# Patient Record
Sex: MALE | Race: BLACK OR AFRICAN AMERICAN | Employment: OTHER | URBAN - METROPOLITAN AREA
[De-identification: names, ages, dates, MRNs, and addresses within clinical notes are randomized per-mention and may not be internally consistent; named-entity substitution may affect disease eponyms.]

---

## 2018-03-27 ENCOUNTER — HOSPITAL ENCOUNTER (OUTPATIENT)
Dept: ULTRASOUND IMAGING | Age: 83
Discharge: HOME | End: 2018-03-27
Attending: INTERNAL MEDICINE
Payer: MEDICARE

## 2018-03-27 DIAGNOSIS — I65.29 STENOSIS OF CAROTID ARTERY, UNSPECIFIED LATERALITY: ICD-10-CM

## 2018-03-27 PROCEDURE — 93880 EXTRACRANIAL BILAT STUDY: CPT

## 2018-03-27 NOTE — PROGRESS NOTES
Carotid Duplex Doppler ultrasound reviewed and the left side where he had previous surgery is normal.  The right side has moderate blockage but is less than 70% so surgery is not indicated without symptoms at this time. Will recheck right side yearly. Continue cholesterol medication and plavix.  Please inform the patient

## 2018-03-28 ENCOUNTER — HOSPITAL ENCOUNTER (OUTPATIENT)
Dept: PHYSICAL THERAPY | Age: 83
Discharge: HOME | End: 2018-03-28
Attending: INTERNAL MEDICINE
Payer: MEDICARE

## 2018-03-28 DIAGNOSIS — R26.9 GAIT ABNORMALITY: ICD-10-CM

## 2018-03-28 PROCEDURE — G8978 MOBILITY CURRENT STATUS: HCPCS

## 2018-03-28 PROCEDURE — 97162 PT EVAL MOD COMPLEX 30 MIN: CPT

## 2018-03-28 PROCEDURE — G8979 MOBILITY GOAL STATUS: HCPCS

## 2018-03-28 NOTE — PROGRESS NOTES
ROBLES BALANCE SCALE  14-Item Long Form Original Version  1. SITTING TO STANDING  INSTRUCTIONS: Please stand up. Try not to use your hands for support.  [] (4) able to stand without using hands and stabilize independently  [x] (3) able to stand independently using hands  [] (2) able to stand using hands after several tries  [] (1) needs minimal aid to stand or to stabilize  [] (0) needs moderate or maximal assist to stand  2. STANDING UNSUPPORTED  INSTRUCTIONS: Please stand for two minutes without holding. [x] (4) able to stand safely 2 minutes  [] (3) able to stand 2 minutes with supervision  [] (2) able to stand 30 seconds unsupported  [] (1) needs several tries to stand 30 seconds unsupported  [] (0) unable to stand 30 seconds unassisted  3. SITTING WITH BACK UNSUPPORTED BUT FEET SUPPORTED ON FLOOR OR ON A STOOL  INSTRUCTIONS: Please sit with arms folded for 2 minutes. [x] (4) able to sit safely and securely 2 minutes  [] (3) able to sit 2 minutes under supervision  [] (2) able to sit 30 seconds  [] (1) able to sit 10 seconds  [] (0) unable to sit without support 10 seconds  4. STANDING TO SITTING  INSTRUCTIONS: Please sit down.  [] (4) sits safely with minimal use of hands  [x] (3) controls descent by using hands  [] (2) uses back of legs against chair to control descent  [] (1) sits independently but has uncontrolled descent  [] (0) needs assistance to sit  5. TRANSFERS  INSTRUCTIONS: Arrange chair(s) for a pivot transfer. Ask subject to transfer one way toward a seat with armrests and one way toward a seat without armrests. You may use two chairs (one with and one without armrests) or a bed and a chair. [x] (4) able to transfer safely with minor use of hands  [] (3) able to transfer safely definite need of hands  [] (2) able to transfer with verbal cueing and/or supervision  [] (1) needs one person to assist  [] (0) needs two people to assist or supervise to be safe  6.  STANDING UNSUPPORTED WITH EYES CLOSED  INSTRUCTIONS: Please close your eyes and stand still for 10 seconds. [x] (4) able to stand 10 seconds safely  [] (3) able to stand 10 seconds with supervision  [] (2) able to stand 3 seconds  [] (1) unable to keep eyes closed 3 seconds but stays steady  [] (0) needs help to keep from falling  7. STANDING UNSUPPORTED WITH FEET TOGETHER  INSTRUCTIONS: Place your feet together and stand without holding.  [] (4) able to place feet together independently and stand 1 minute safely  [x] (3) able to place feet together independently and stand for 1 minute with supervision  [] (2) able to place feet together independently but unable to hold for 30 seconds  [] (1) needs help to attain position but able to stand 15 seconds feet together  [] (0) needs help to attain position and unable to hold for 15 seconds  8. REACHING FORWARD WITH OUTSTRETCHED ARM WHILE STANDING  INSTRUCTIONS: Lift arm to 90 degrees. Stretch out your fingers and reach forward as far as you can. (Examiner places a ruler at end of fingertips when arm is at 90 degrees. Fingers should not touch the ruler while reaching forward. The recorded measure is the distance forward that the finger reaches while the subject is in the most forward lean position. When possible, ask subject to use both arms when reaching to avoid rotation of the trunk.)  [] (4) can reach forward confidently > 25 cm (10 inches)  [x] (3) can reach forward > 12 cm safely (5 inches)  [] (2) can reach forward > 5 cm safely (2 inches)  [] (1) reaches forward but needs supervision  [] (0) loses balance while trying/requires external support  9.  OBJECT FROM FLOOR FROM A STANDING POSITION  INSTRUCTIONS:  shoe/slipper which is placed in front of your feet.   [x] (4) able to  slipper safely and easily   [] (3) able to  slipper but needs supervision  [] (2) unable to  but reaches 2-5 cm (1-2 inches) from slipper and keeps balance independently  [] (1) unable to  and needs supervision while trying  [] (0) unable to try/needs assist to keep from losing balance or falling  10. TURNING TO LOOK BEHIND OVER LEFT AND RIGHT SHOULDERS WHILE STANDING  INSTRUCTIONS: Turn to look directly behind you over toward left shoulder. Repeat to the right. Examiner may pick an object to look at directly behind the subject to encourage a better twist turn. [x] (4) looks behind from both sides and weight shifts well  [] (3) looks behind one side only other side shows less weight shift  [] (2) turns sideways only but maintains balance  [] (1) needs supervision when turning  [] (0) needs assist to keep from losing balance or falling  11. TURN 360 DEGREES  INSTRUCTIONS: Turn completely around in a full [place]. Pause. Then turn a full [place] in the other direction.  [] (4) able to turn 360 degrees safely in 4 seconds or less  [x] (3) able to turn 360 degrees safely one side only in 4 seconds or less (to L side)  [] (2) able to turn 360 degrees safely but slowly  [] (1) needs close supervision or verbal cueing  [] (0) needs assistance while turning  12. PLACING ALTERNATE FOOT ON STEP OR STOOL WHILE STANDING UNSUPPORTED  INSTRUCTIONS: Place each foot alternately on the step/stool. Continue until each foot has touched the step/stool four times. [x] (4) able to stand independently and safely and complete 8 steps in 20 seconds  [] (3) able to stand independently and complete 8 steps > 20 seconds  [] (2) able to complete 4 steps without aid with supervision  [] (1) able to complete > 2 steps needs minimal assist  [] (0) needs assistance to keep from falling/unable to try  13. STANDING UNSUPPORTED ONE FOOT IN FRONT   INSTRUCTIONS: (DEMONSTRATE TO SUBJECT) Place one foot directly in front of the other.  If you feel that you cannot place your foot directly in front, try to step far enough ahead that the heel of your forward foot is ahead of the toes of the other foot (to score 3 points, the length of the step should exceed the length of the other foot and the width of the stance should approximate the subject's normal stride width)  [x] (4) able to place foot tandem independently and hold 30 seconds  [] (3) able to place foot ahead of other independently and hold 30 seconds  [] (2) able to take small step independently and hold 30 seconds  [] (1) needs help to step but can hold 15 seconds  [] (0) loses balance while stepping or standing  14.  STANDING ON ONE LEG  INSTRUCTIONS: Stand on one leg as long as you can without holding  [x] (4) able to lift leg independently and hold >10 seconds  [] (3) able to lift leg independently and hold 5-10 seconds  [] (2) able to lift leg independently and hold = or >3 seconds  [] (1) tries to lift leg unable to hold 3 seconds but remains standing independently  [] (0) unable to try or needs assist to prevent fall    Total Score: 51/56 (a person scoring below 45 is considered to be at risk for falling)

## 2018-03-28 NOTE — THERAPY EVALUATION
Mihaela Melendrez  : 1935  [place]  Secondary: [address] at [facility], [facility], [address]  [phone]   [phone]          OUTPATIENT PHYSICAL THERAPY:Initial Assessment 3/28/2018    ICD-10: Treatment Diagnosis: Difficulty in walking, not elsewhere classified (R26.2)   Unsteadiness on feet (R26.81)   Precautions/Allergies:   Zinc   Fall Risk Score: 7 (? 5 = High Risk)  MD Orders: Evaluate and treat MEDICAL/REFERRING DIAGNOSIS:  Gait abnormality [R26.9]   DATE OF ONSET: ongoing (but has gotten noticeably worse in the past year per pt report)  REFERRING PHYSICIAN: Nina Kellogg MD  RETURN PHYSICIAN APPOINTMENT: 2018   This section established at most recent assessment  INITIAL ASSESSMENT:  Mihaela Melendrez is a [de-identified] y.o. male who presents to physical therapy for decreased balance with ambulation. This session, he demonstrated slightly decreased B LE strength/endurance, multiple postural and gait deficits, and decreased dynamic standing balance as evident by a score of 18/30 on the Functional Gait Assessment (scores of 23 or less indicating an increased risk for falls). Pt may benefit from skilled PT to address the above listed deficits to improve safety and independence with transfers and ambulation prior to discharge. PROBLEM LIST (Impacting functional limitations):  1. Decreased Strength  2. Decreased ADL/Functional Activities  3. Decreased Transfer Abilities  4. Decreased Ambulation Ability/Technique  5. Decreased Balance  6. Decreased Activity Tolerance  7. Decreased Flexibility/Joint Mobility INTERVENTIONS PLANNED:  1. Balance Exercise  2. Bed Mobility  3. Cold  4. Family Education  5. Gait Training  6. Heat  7. Home Exercise Program (HEP)  8. Manual Therapy  9. Neuromuscular Re-education/Strengthening  10. Range of Motion (ROM)  11. Therapeutic Activites  12.  Therapeutic Exercise/Strengthening  13. Transfer Training   TREATMENT PLAN:  Effective Dates: 3/28/2018 TO 5/27/2018 (60 days). Frequency/Duration: 1 time a week for 60 Days  GOALS: (Goals have been discussed and agreed upon with patient.)  Short-Term Functional Goals: Time Frame: 30 days  1. Pt will be compliant with HEP in order to increase LE strength/endurance/mobility to improve functional mobility and overall quality of life. 2. Pt will demonstrate increase in Armas Balance Score to 53/56 in order to increase static/dynamic standing balance to reduce fall risk and improve safety and independence with all standing activities. 3. Pt will improve score on Functional Gait Assessment to 21/30 in order to in order to increase stability and improve safety/independence when walking in varied environments. Discharge Goals: Time Frame: 60 days  1. Pt will be independent with HEP in order to increase LE strength/endurance/mobility to improve functional mobility and overall quality of life. 2. Pt will demonstrate increase in Armas Balance Score to 56/56 in order to increase static/dynamic standing balance to reduce fall risk and improve safety and independence with all standing activities. 3. Pt will improve score on Functional Gait Assessment to 24/30 in order to in order to increase stability and improve safety/independence when walking in varied environments. Rehabilitation Potential For Stated Goals: Good  Regarding [facility] therapy, I certify that the treatment plan above will be carried out by a therapist or under their direction. Thank you for this referral,  Alicia Fagan DPT     Referring Physician Signature: Ros Purcell MD              Date                    HISTORY:   History of Present Injury/Illness (Reason for Referral): *History per pt or pt's family except where otherwise noted  Pt states his balance has been getting worse over the past several years.  States he feels like he bumps into walls, and when he walks he feels like his gait is a little \"slanted\". Pt states he walks with cane for community distances. He also has experienced vertigo when looking down before, but he has not experienced this for about 16 months per his report (states if he looks down he does get slightly dizzy). He also mentions that he gets a numbness in the L side of his face (had carotid surgery in 2004, but he had a sonagram for this yesterday with no positive results). He also has tinnitis and pain in his L ear. States he struggles with dry eyes, and has the most trouble in the mornings, and is not able to see very well for about 45 minutes. Pt states he has had one fall related to his knees (he was stooping and fell backward onto his rear end per his report), and he has an orthopedic visit scheduled to get both knees checked out soon (has been getting gel injections in every 6 months). Past Medical History/Comorbidities:   Mr. Eloina Mayer  has a past medical history of Arrhythmia; Arthritis; BPPV (benign paroxysmal positional vertigo); Carotid stenosis; Chronic kidney disease; Depression; Diabetes ([address] (dx 1995); GERD (gastroesophageal reflux disease); Hyperlipemia; Hypertension; Osteoarthritis, knee; PUD (peptic ulcer disease) (05/2016); TIA (transient ischemic attack) (2003); and Varicella. He also has no past medical history of Chronic obstructive pulmonary disease ([address] Mr. Eloina Mayer  has a past surgical history that includes hx lumbar laminectomy; hx thoracic laminectomy (2004); hx carotid endarterectomy (2002); hx hemorrhoidectomy (2007); hx other surgical (2005); and hx colonoscopy (05/01/2016).   Social History/Living Environment:    lives alone in 2 story house with 10 steps to get upstairs with rail on L side as he goes up (only goes upstairs about once/week)  Prior Level of Function/Work/Activity:  Retired; walks at apartment complex that his sister lives in for about 1 hr about 3 times/week on treadmill (uses B  support); volunteers at soup kitchen 6 hours/week (2 days a week for 3 hours each day - has to stand for this time); goes to [employer]; pt has life alert on his wrist  Dominant Side:         RIGHT  Personal Factors:          Sex:  male        Age:  [de-identified] y.o. Current Medications:    Current Outpatient Prescriptions:     glipiZIDE (GLUCOTROL) 5 mg tablet, Take 1 Tab by mouth two (2) times a day., Disp: 180 Tab, Rfl: 1    losartan (COZAAR) 100 mg tablet, Take 1 Tab by mouth daily. , Disp: 90 Tab, Rfl: 1    pantoprazole (PROTONIX) 40 mg tablet, Take 1 Tab by mouth two (2) times a day., Disp: 180 Tab, Rfl: 1    simvastatin (ZOCOR) 20 mg tablet, Take 1 Tab by mouth nightly., Disp: 90 Tab, Rfl: 1    traZODone (DESYREL) 50 mg tablet, TAKE 1 TO 2 TABLETS BY MOUTH AT NIGHT AS NEEDED FOR SLEEP, Disp: 60 Tab, Rfl: 3    carvedilol (COREG) 12.5 mg tablet, Take 1 Tab by mouth two (2) times daily (with meals). , Disp: 180 Tab, Rfl: 1    clopidogrel (PLAVIX) 75 mg tab, Take 1 Tab by mouth daily. , Disp: 90 Tab, Rfl: 1    tadalafil (CIALIS) 5 mg tablet, Take 1 Tab by mouth daily as needed. , Disp: 30 Tab, Rfl: 3    desonide (TRIDESILON) 0.05 % cream, Apply  to affected area two (2) times a day., Disp: 45 g, Rfl: 0    psyllium (METAMUCIL) packet, Take 1 Packet by mouth as needed. , Disp: , Rfl:     carboxymethylcellulose sodium (REFRESH LIQUIGEL) 1 % dlgl, Apply  to eye two (2) times a day., Disp: , Rfl:     VOLTAREN 1 % gel, APPLY TOPICALLY QID, Disp: , Rfl: 1    BLOOD-GLUCOSE METER (FREESTYLE LITE METER), by Does Not Apply route., Disp: , Rfl:     LANCETS, by Does Not Apply route., Disp: , Rfl:     ASCENSIA CONTOUR strip, , Disp: , Rfl: 0    polyethylene glycol (MIRALAX) 17 gram/dose powder, Take 17 g by mouth as needed. , Disp: , Rfl:    Date Last Reviewed:  3/28/2018   # of Personal Factors/Comorbidities that affect the Plan of Care: 1-2: MODERATE COMPLEXITY   EXAMINATION:   Initial assessment on 3/28/2018  Observation/Orthostatic Postural Assessment:          Seated: kyphotic posture, forward head, rounded shoulders, posterior pelvic tilt        Standing: slight forward trunk lean  ROM:    Initial measurement: (on 3/28/2018) Initial measurement: (on 3/28/2018) Reassessment measurement:  Reassessment measurement:    L LE:  Hip ER and knee flexion PROM slightly limited at end range (knee flexion painful at end range);    Tight hamstrings and plantarflexors R LE: WFL throughout hip/knee/ankle;   Tight hamstrings and plantarflexors     Comments about UE ROM: WFL    Strength:    Initial measurement: (on 3/28/2018) Initial measurement: (on 3/28/2018) Reassessment measurement:  Reassessment measurement:    L LE:  Hip flexion: 4+/5  Hip extension: 3+/5  Hip abduction: 4/5  Hip adduction: 4-/5  Hip IR: 4/5 (pain in back)  Hip ER: 4/5   Knee flexion: 4-/5  Knee extension: 5/5  Ankle DF: 4+/5  Ankle PF: WFL R LE:  Hip flexion: 4+/5  Hip extension: 3+/5  Hip abduction: 5/5  Hip adduction: 4/5  Hip IR: 4/5  Hip ER: 4/5  Knee flexion: 4/5  Knee extension: 5/5  Ankle DF: 4+/5  Ankle PF: WFL     Comments about UE strength: slightly decreased at L UE but WNL    Neurological Screen:        Myotomes:  WFL        Dermatomes:  Slightly decreased sensation at lateral side of L foot  Functional Mobility:         Gait/Ambulation:  Pt ambulates with no assistive device with following gait deficits: slightly decreased B step length/heel strike/clearance, increased B hip IR at initial contact at each LE, decreased arm swing, increased forward trunk lean        Transfers:     Level of assist   Sit to stand Rosanne; pushes up to stand   Stand to sit Rosanne; reaches back to sit   Chair to mat independent   Mat to chair independent           Bed Mobility:     Level of assist   Sit to supine independent   Rolling R independent   Rolling L independent   Scooting up/down/sideways independent   Supine to sit independent     Balance:          good in sitting; good in static standing; fair in dynamic standing  Coordination:          No deficits noted  Mental Status:          WFL  Sensation:         Slightly decreased at lateral L foot   Body Structures Involved:  1. Nerves  2. Bones  3. Joints  4. Muscles  5. Ligaments Body Functions Affected:  1. Sensory/Pain  2. Neuromusculoskeletal  3. Movement Related Activities and Participation Affected:  1. General Tasks and Demands  2. Mobility  3. Self Care  4. Domestic Life  5. Interpersonal Interactions and Relationships  6. Community, Social and [place]   # of elements that affect the Plan of Care: 4+: HIGH COMPLEXITY   CLINICAL PRESENTATION:   Presentation: Evolving clinical presentation with changing clinical characteristics: MODERATE COMPLEXITY   CLINICAL DECISION MAKING:   Outcome Measure: Tool Used: Armas Balance Scale  Score:  Initial: 51/56 Most Recent: X/56 (Date: -- )   Interpretation of Score: Each section is scored on a 0-4 scale, 0 representing the patients inability to perform the task and 4 representing independence. The scores of each section are added together for a total score of 56. The higher the patients score, the more independent the patient is. Any score below 45 indicates increased risk for falls. Score 56 55-45 44-34 33-23 22-12 11-1 0   Modifier  CI CJ CK CL CM CN     Tool Used: Functional Gait Assessment  Score:  Initial: 18/30  Most Recent: X/30 (Date: -- )   Interpretation of Score: This test is a modification of the Dynamic Gait Index. It is a 10 item test with each item score 0-3 that assesses postural stability during various walking tasks. Score 30 29-24 23-18 17-13 12-7 6-1 0   Modifier  CI CJ CK CL CM CN     ?  Mobility - Walking and Moving Around:     - CURRENT STATUS: CJ - 20%-39% impaired, limited or restricted    - GOAL STATUS: CI - 1%-19% impaired, limited or restricted    - D/C STATUS:  ---------------To be determined---------------  [place] / [place] / Product Type: Medicare /   Medical Necessity:   · Patient is expected to demonstrate progress in strength, range of motion, balance and functional technique to improve ability to perform safe ambulation. · Patient demonstrates good rehab potential due to higher previous functional level. Reason for Services/Other Comments:  · Patient continues to require modification of therapeutic interventions to increase complexity of exercises. Use of outcome tool(s) and clinical judgement create a POC that gives a: Questionable prediction of patient's progress: MODERATE COMPLEXITY   TREATMENT:   (In addition to Assessment/Re-Assessment sessions the following treatments were rendered)  Subjective comments at beginning of session: See history above. Assessment only today, no treatment provided. Treatment/Session Assessment:  See assessment above. · Pain/ Symptoms: Initial:   5/10 pain in L knee Post Session:  No change in pain noted ·   Compliance with Program/Exercises: Will assess as treatment progresses. · Recommendations/Intent for next treatment session: \"Next visit will focus on advancements to more challenging activities and reduction in assistance provided\".  Work on gait training (especially with head movements or decreased vision); higher level balance activities; exercises with BIG movement  Total Treatment Duration:  PT Patient Time In/Time Out  Time In: 0825  Time Out: [address], DPT

## 2018-03-28 NOTE — PROGRESS NOTES
Ambulatory/Rehab Services H2 Model Falls Risk Assessment    Risk Factor Pts. ·   Confusion/Disorientation/Impulsivity  []    4 ·   Symptomatic Depression  []   2 ·   Altered Elimination  []   1 ·   Dizziness/Vertigo  []   1 ·   Gender (Male)  [x]   1 ·   Any administered antiepileptics (anticonvulsants):  []   2 ·   Any administered benzodiazepines:  []   1 ·   Visual Impairment (specify):  []   1 ·   Portable Oxygen Use  []   1 ·   Orthostatic ? BP  []   1 ·   History of Recent Falls (within 3 mos.)  [x]   5     Ability to Rise from Chair (choose one) Pts. ·   Ability to rise in a single movement  []   0 ·   Pushes up, successful in one attempt  [x]   1 ·   Multiple attempts, but successful  []   3 ·   Unable to rise without assistance  []   4   Total: (5 or greater = High Risk) 7     Falls Prevention Plan:   [x]                Physical Limitations to Exercise (specify): supervised standing exercises initially   [x]                Mobility Assistance Device (type):  cane   []                Exercise/Equipment Adaptation (specify):    ©2010 [facility] [address] [place] [address] Patent #[id number].  Federal Law prohibits the replication, distribution or use without written permission from [facility]

## 2018-04-03 ENCOUNTER — HOSPITAL ENCOUNTER (OUTPATIENT)
Dept: PHYSICAL THERAPY | Age: 83
Discharge: HOME | End: 2018-04-03
Attending: INTERNAL MEDICINE
Payer: MEDICARE

## 2018-04-03 PROCEDURE — 97112 NEUROMUSCULAR REEDUCATION: CPT

## 2018-04-03 PROCEDURE — 97110 THERAPEUTIC EXERCISES: CPT

## 2018-04-03 NOTE — PROGRESS NOTES
Home Exercise Program    With support at counter and chair, practice standing with one foot in front of the other with:  1. Eyes open x 30 seconds   2. Eyes closed x 30 seconds  3. Eyes open, turning head slowly left/right x 10 reps/1 set each direction  4.  Eyes open, turning head up/down x 10 reps/1 set each direction

## 2018-04-03 NOTE — PROGRESS NOTES
Nestor Huynh  : 1935  [place]  Secondary: [address] at [facility], [facility], [address]  [phone]   [phone]          OUTPATIENT PHYSICAL THERAPY:Daily Note 4/3/2018    ICD-10: Treatment Diagnosis: Difficulty in walking, not elsewhere classified (R26.2)   Unsteadiness on feet (R26.81)   Precautions/Allergies:   Zinc   Fall Risk Score: 7 (? 5 = High Risk)  MD Orders: Evaluate and treat MEDICAL/REFERRING DIAGNOSIS:  Gait abnormality [R26.9]   DATE OF ONSET: ongoing (but has gotten noticeably worse in the past year per pt report)  REFERRING PHYSICIAN: Katie Portillo MD  RETURN PHYSICIAN APPOINTMENT: 2018   This section established at most recent assessment  INITIAL ASSESSMENT:  Nestor Huynh is a [de-identified] y.o. male who presents to physical therapy for decreased balance with ambulation. This session, he demonstrated slightly decreased B LE strength/endurance, multiple postural and gait deficits, and decreased dynamic standing balance as evident by a score of 18/30 on the Functional Gait Assessment (scores of 23 or less indicating an increased risk for falls). Pt may benefit from skilled PT to address the above listed deficits to improve safety and independence with transfers and ambulation prior to discharge. PROBLEM LIST (Impacting functional limitations):  1. Decreased Strength  2. Decreased ADL/Functional Activities  3. Decreased Transfer Abilities  4. Decreased Ambulation Ability/Technique  5. Decreased Balance  6. Decreased Activity Tolerance  7. Decreased Flexibility/Joint Mobility INTERVENTIONS PLANNED:  1. Balance Exercise  2. Bed Mobility  3. Cold  4. Family Education  5. Gait Training  6. Heat  7. Home Exercise Program (HEP)  8. Manual Therapy  9. Neuromuscular Re-education/Strengthening  10. Range of Motion (ROM)  11. Therapeutic Activites  12.  Therapeutic Exercise/Strengthening  13. Transfer Training   TREATMENT PLAN:  Effective Dates: 3/28/2018 TO 5/27/2018 (60 days). Frequency/Duration: 1 time a week for 60 Days  GOALS: (Goals have been discussed and agreed upon with patient.)  Short-Term Functional Goals: Time Frame: 30 days  1. Pt will be compliant with HEP in order to increase LE strength/endurance/mobility to improve functional mobility and overall quality of life. 2. Pt will demonstrate increase in Armas Balance Score to 53/56 in order to increase static/dynamic standing balance to reduce fall risk and improve safety and independence with all standing activities. 3. Pt will improve score on Functional Gait Assessment to 21/30 in order to in order to increase stability and improve safety/independence when walking in varied environments. Discharge Goals: Time Frame: 60 days  1. Pt will be independent with HEP in order to increase LE strength/endurance/mobility to improve functional mobility and overall quality of life. 2. Pt will demonstrate increase in Armas Balance Score to 56/56 in order to increase static/dynamic standing balance to reduce fall risk and improve safety and independence with all standing activities. 3. Pt will improve score on Functional Gait Assessment to 24/30 in order to in order to increase stability and improve safety/independence when walking in varied environments. Rehabilitation Potential For Stated Goals: Good  Regarding Jammie Marks therapy, I certify that the treatment plan above will be carried out by a therapist or under their direction. Thank you for this referral,  Radha Edmond DPT     Referring Physician Signature: Shruthi Kirk MD              Date                    HISTORY:   History of Present Injury/Illness (Reason for Referral): *History per pt or pt's family except where otherwise noted  Pt states his balance has been getting worse over the past several years.  States he feels like he bumps into walls, and when he walks he feels like his gait is a little \"slanted\". Pt states he walks with cane for community distances. He also has experienced vertigo when looking down before, but he has not experienced this for about 16 months per his report (states if he looks down he does get slightly dizzy). He also mentions that he gets a numbness in the L side of his face (had carotid surgery in 2004, but he had a sonagram for this yesterday with no positive results). He also has tinnitis and pain in his L ear. States he struggles with dry eyes, and has the most trouble in the mornings, and is not able to see very well for about 45 minutes. Pt states he has had one fall related to his knees (he was stooping and fell backward onto his rear end per his report), and he has an orthopedic visit scheduled to get both knees checked out soon (has been getting gel injections in every 6 months). Past Medical History/Comorbidities:   Mr. Madhu Galloway  has a past medical history of Arrhythmia; Arthritis; BPPV (benign paroxysmal positional vertigo); Carotid stenosis; Chronic kidney disease; Depression; Diabetes ([address] (dx 1995); GERD (gastroesophageal reflux disease); Hyperlipemia; Hypertension; Osteoarthritis, knee; PUD (peptic ulcer disease) (05/2016); TIA (transient ischemic attack) (2003); and Varicella. He also has no past medical history of Chronic obstructive pulmonary disease ([address] Mr. Madhu Galloway  has a past surgical history that includes hx lumbar laminectomy; hx thoracic laminectomy (2004); hx carotid endarterectomy (2002); hx hemorrhoidectomy (2007); hx other surgical (2005); and hx colonoscopy (05/01/2016).   Social History/Living Environment:    lives alone in 2 story house with 10 steps to get upstairs with rail on L side as he goes up (only goes upstairs about once/week)  Prior Level of Function/Work/Activity:  Retired; walks at apartment complex that his sister lives in for about 1 hr about 3 times/week on treadmill (uses B  support); volunteers at soup kitchen 6 hours/week (2 days a week for 3 hours each day - has to stand for this time); goes to [employer]; pt has life alert on his wrist  Dominant Side:         RIGHT  Personal Factors:          Sex:  male        Age:  [de-identified] y.o. Current Medications:    Current Outpatient Prescriptions:     glipiZIDE (GLUCOTROL) 5 mg tablet, Take 1 Tab by mouth two (2) times a day., Disp: 180 Tab, Rfl: 1    losartan (COZAAR) 100 mg tablet, Take 1 Tab by mouth daily. , Disp: 90 Tab, Rfl: 1    pantoprazole (PROTONIX) 40 mg tablet, Take 1 Tab by mouth two (2) times a day., Disp: 180 Tab, Rfl: 1    simvastatin (ZOCOR) 20 mg tablet, Take 1 Tab by mouth nightly., Disp: 90 Tab, Rfl: 1    traZODone (DESYREL) 50 mg tablet, TAKE 1 TO 2 TABLETS BY MOUTH AT NIGHT AS NEEDED FOR SLEEP, Disp: 60 Tab, Rfl: 3    carvedilol (COREG) 12.5 mg tablet, Take 1 Tab by mouth two (2) times daily (with meals). , Disp: 180 Tab, Rfl: 1    clopidogrel (PLAVIX) 75 mg tab, Take 1 Tab by mouth daily. , Disp: 90 Tab, Rfl: 1    tadalafil (CIALIS) 5 mg tablet, Take 1 Tab by mouth daily as needed. , Disp: 30 Tab, Rfl: 3    desonide (TRIDESILON) 0.05 % cream, Apply  to affected area two (2) times a day., Disp: 45 g, Rfl: 0    psyllium (METAMUCIL) packet, Take 1 Packet by mouth as needed. , Disp: , Rfl:     carboxymethylcellulose sodium (REFRESH LIQUIGEL) 1 % dlgl, Apply  to eye two (2) times a day., Disp: , Rfl:     VOLTAREN 1 % gel, APPLY TOPICALLY QID, Disp: , Rfl: 1    BLOOD-GLUCOSE METER (FREESTYLE LITE METER), by Does Not Apply route., Disp: , Rfl:     LANCETS, by Does Not Apply route., Disp: , Rfl:     ASCENSIA CONTOUR strip, , Disp: , Rfl: 0    polyethylene glycol (MIRALAX) 17 gram/dose powder, Take 17 g by mouth as needed. , Disp: , Rfl:    Date Last Reviewed:  4/3/2018   # of Personal Factors/Comorbidities that affect the Plan of Care: 1-2: MODERATE COMPLEXITY   EXAMINATION:   Initial assessment on 3/28/2018  Observation/Orthostatic Postural Assessment:          Seated: kyphotic posture, forward head, rounded shoulders, posterior pelvic tilt        Standing: slight forward trunk lean  ROM:    Initial measurement: (on 3/28/2018) Initial measurement: (on 3/28/2018) Reassessment measurement:  Reassessment measurement:    L LE:  Hip ER and knee flexion PROM slightly limited at end range (knee flexion painful at end range);    Tight hamstrings and plantarflexors R LE: WFL throughout hip/knee/ankle;   Tight hamstrings and plantarflexors     Comments about UE ROM: WFL    Strength:    Initial measurement: (on 3/28/2018) Initial measurement: (on 3/28/2018) Reassessment measurement:  Reassessment measurement:    L LE:  Hip flexion: 4+/5  Hip extension: 3+/5  Hip abduction: 4/5  Hip adduction: 4-/5  Hip IR: 4/5 (pain in back)  Hip ER: 4/5   Knee flexion: 4-/5  Knee extension: 5/5  Ankle DF: 4+/5  Ankle PF: WFL R LE:  Hip flexion: 4+/5  Hip extension: 3+/5  Hip abduction: 5/5  Hip adduction: 4/5  Hip IR: 4/5  Hip ER: 4/5  Knee flexion: 4/5  Knee extension: 5/5  Ankle DF: 4+/5  Ankle PF: WFL     Comments about UE strength: slightly decreased at L UE but WNL    Neurological Screen:        Myotomes:  WFL        Dermatomes:  Slightly decreased sensation at lateral side of L foot  Functional Mobility:         Gait/Ambulation:  Pt ambulates with no assistive device with following gait deficits: slightly decreased B step length/heel strike/clearance, increased B hip IR at initial contact at each LE, decreased arm swing, increased forward trunk lean        Transfers:     Level of assist   Sit to stand Rosanne; pushes up to stand   Stand to sit Rosanne; reaches back to sit   Chair to mat independent   Mat to chair independent           Bed Mobility:     Level of assist   Sit to supine independent   Rolling R independent   Rolling L independent   Scooting up/down/sideways independent   Supine to sit independent     Balance:          good in sitting; good in static standing; fair in dynamic standing  Coordination:          No deficits noted  Mental Status:          WFL  Sensation:         Slightly decreased at lateral L foot   Body Structures Involved:  1. Nerves  2. Bones  3. Joints  4. Muscles  5. Ligaments Body Functions Affected:  1. Sensory/Pain  2. Neuromusculoskeletal  3. Movement Related Activities and Participation Affected:  1. General Tasks and Demands  2. Mobility  3. Self Care  4. Domestic Life  5. Interpersonal Interactions and Relationships  6. Community, Social and [place]   # of elements that affect the Plan of Care: 4+: HIGH COMPLEXITY   CLINICAL PRESENTATION:   Presentation: Evolving clinical presentation with changing clinical characteristics: MODERATE COMPLEXITY   CLINICAL DECISION MAKING:   Outcome Measure: Tool Used: Armas Balance Scale  Score:  Initial: 51/56 Most Recent: X/56 (Date: -- )   Interpretation of Score: Each section is scored on a 0-4 scale, 0 representing the patients inability to perform the task and 4 representing independence. The scores of each section are added together for a total score of 56. The higher the patients score, the more independent the patient is. Any score below 45 indicates increased risk for falls. Score 56 55-45 44-34 33-23 22-12 11-1 0   Modifier  CI CJ CK CL CM CN     Tool Used: Functional Gait Assessment  Score:  Initial: 18/30  Most Recent: X/30 (Date: -- )   Interpretation of Score: This test is a modification of the Dynamic Gait Index. It is a 10 item test with each item score 0-3 that assesses postural stability during various walking tasks. Score 30 29-24 23-18 17-13 12-7 6-1 0   Modifier  CI CJ CK CL CM CN     ?  Mobility - Walking and Moving Around:     - CURRENT STATUS: CJ - 20%-39% impaired, limited or restricted    - GOAL STATUS: CI - 1%-19% impaired, limited or restricted    - D/C STATUS:  ---------------To be determined---------------  [place] / [place] / Product Type: Medicare /   Medical Necessity:   · Patient is expected to demonstrate progress in strength, range of motion, balance and functional technique to improve ability to perform safe ambulation. · Patient demonstrates good rehab potential due to higher previous functional level. Reason for Services/Other Comments:  · Patient continues to require modification of therapeutic interventions to increase complexity of exercises. Use of outcome tool(s) and clinical judgement create a POC that gives a: Questionable prediction of patient's progress: MODERATE COMPLEXITY   TREATMENT:   (In addition to Assessment/Re-Assessment sessions the following treatments were rendered)  Subjective comments at beginning of session: Pt states he got x-rays of B knees and L shoulder; got injections in his B knees  THERAPEUTIC EXERCISE: (13 minutes):  Exercises per grid below to improve mobility, strength, balance and dynamic movement of leg - bilateral to improve functional ambulation. Required minimal visual, verbal and manual cues to promote proper body alignment, promote proper body posture and promote proper body mechanics. Progressed resistance, range, repetitions and complexity of movement as indicated. Date:  4/3/2018 Date:   Date:   Activity/Exercise Parameters Parameters    Nustep L3 resistance; 10 minutes with B LEs only for strength/endurance     Ankle plantarflexor stretch on incline board 30 second hold x 3 reps with knees extended then flexed with B UE support                                   *given in HEP    NEUROMUSCULAR RE-EDUCATION: (32 minutes):  Exercise/activities per grid below to improve balance, coordination and posture. Required minimal visual, verbal and manual cues to promote static and dynamic balance in standing.    Date:  4/3/2018 Date:   Date:   Activity/Exercise Parameters Parameters    Tapping on rocker board Anterior/posterior taps x 20 reps with B UE support progressing to minimal to no UE support with occasional CGA-Jeanette to correct losses of balance; lateral taps x 20 reps B UE support progressing quickly to no UE support with S only  Also progressed to eyes closed both directions with same levels of assist     Static standing balance on foam Tandem position each LE forward x 30 second trials eyes open with minimal to no UE use; 5-15 second trials eyes closed with occasional UE use; 10 reps/1 set looking up/down with occasional UE use; 10 reps/1 set looking L/R with occasional UE use                                   *given in HEP       Treatment/Session Assessment:  Pt demonstrating good technique with balance exercises with eyes open, but had increased difficulty performing with closed eyes/head movement. He did demonstrate improved ability to perform more challenging balance exercises with increased practice. Gave pt tandem static standing balance exercises for HEP. · Pain/ Symptoms: Initial:   4.5/10 pain in L knee Post Session:  No change in pain noted at end of session ·   Compliance with Program/Exercises: Will assess as treatment progresses. · Recommendations/Intent for next treatment session: \"Next visit will focus on advancements to more challenging activities and reduction in assistance provided\".  Work on gait training (especially with head movements or decreased vision); higher level balance activities; exercises with BIG movement  Total Treatment Duration:  PT Patient Time In/Time Out  Time In: 1030  Time Out: LAI Guan

## 2018-04-10 ENCOUNTER — HOSPITAL ENCOUNTER (OUTPATIENT)
Dept: PHYSICAL THERAPY | Age: 83
Discharge: HOME | End: 2018-04-10
Attending: INTERNAL MEDICINE
Payer: MEDICARE

## 2018-04-10 PROCEDURE — 97110 THERAPEUTIC EXERCISES: CPT

## 2018-04-10 PROCEDURE — 97112 NEUROMUSCULAR REEDUCATION: CPT

## 2018-04-10 NOTE — PROGRESS NOTES
Hieu Murray  : 1935  [place]  Secondary: [address] at [facility], [facility], [address]  [phone]   [phone]          OUTPATIENT PHYSICAL THERAPY:Daily Note 4/10/2018    ICD-10: Treatment Diagnosis: Difficulty in walking, not elsewhere classified (R26.2)   Unsteadiness on feet (R26.81)   Precautions/Allergies:   Zinc   Fall Risk Score: 7 (? 5 = High Risk)  MD Orders: Evaluate and treat MEDICAL/REFERRING DIAGNOSIS:  Gait abnormality [R26.9]   DATE OF ONSET: ongoing (but has gotten noticeably worse in the past year per pt report)  REFERRING PHYSICIAN: Kelle Chu MD  RETURN PHYSICIAN APPOINTMENT: 2018   This section established at most recent assessment  INITIAL ASSESSMENT:  Hieu Murray is a [de-identified] y.o. male who presents to physical therapy for decreased balance with ambulation. This session, he demonstrated slightly decreased B LE strength/endurance, multiple postural and gait deficits, and decreased dynamic standing balance as evident by a score of 18/30 on the Functional Gait Assessment (scores of 23 or less indicating an increased risk for falls). Pt may benefit from skilled PT to address the above listed deficits to improve safety and independence with transfers and ambulation prior to discharge. PROBLEM LIST (Impacting functional limitations):  1. Decreased Strength  2. Decreased ADL/Functional Activities  3. Decreased Transfer Abilities  4. Decreased Ambulation Ability/Technique  5. Decreased Balance  6. Decreased Activity Tolerance  7. Decreased Flexibility/Joint Mobility INTERVENTIONS PLANNED:  1. Balance Exercise  2. Bed Mobility  3. Cold  4. Family Education  5. Gait Training  6. Heat  7. Home Exercise Program (HEP)  8. Manual Therapy  9. Neuromuscular Re-education/Strengthening  10. Range of Motion (ROM)  11. Therapeutic Activites  12.  Therapeutic Exercise/Strengthening  13. Transfer Training   TREATMENT PLAN:  Effective Dates: 3/28/2018 TO 5/27/2018 (60 days). Frequency/Duration: 1 time a week for 60 Days  GOALS: (Goals have been discussed and agreed upon with patient.)  Short-Term Functional Goals: Time Frame: 30 days  1. Pt will be compliant with HEP in order to increase LE strength/endurance/mobility to improve functional mobility and overall quality of life. 2. Pt will demonstrate increase in Armas Balance Score to 53/56 in order to increase static/dynamic standing balance to reduce fall risk and improve safety and independence with all standing activities. 3. Pt will improve score on Functional Gait Assessment to 21/30 in order to in order to increase stability and improve safety/independence when walking in varied environments. Discharge Goals: Time Frame: 60 days  1. Pt will be independent with HEP in order to increase LE strength/endurance/mobility to improve functional mobility and overall quality of life. 2. Pt will demonstrate increase in Armas Balance Score to 56/56 in order to increase static/dynamic standing balance to reduce fall risk and improve safety and independence with all standing activities. 3. Pt will improve score on Functional Gait Assessment to 24/30 in order to in order to increase stability and improve safety/independence when walking in varied environments. Rehabilitation Potential For Stated Goals: Good  Regarding Rosalio Chime therapy, I certify that the treatment plan above will be carried out by a therapist or under their direction. Thank you for this referral,  Darshana Roque DPT     Referring Physician Signature: Nina Kellogg MD              Date                    HISTORY:   History of Present Injury/Illness (Reason for Referral): *History per pt or pt's family except where otherwise noted  Pt states his balance has been getting worse over the past several years.  States he feels like he bumps into walls, and when he walks he feels like his gait is a little \"slanted\". Pt states he walks with cane for community distances. He also has experienced vertigo when looking down before, but he has not experienced this for about 16 months per his report (states if he looks down he does get slightly dizzy). He also mentions that he gets a numbness in the L side of his face (had carotid surgery in 2004, but he had a sonagram for this yesterday with no positive results). He also has tinnitis and pain in his L ear. States he struggles with dry eyes, and has the most trouble in the mornings, and is not able to see very well for about 45 minutes. Pt states he has had one fall related to his knees (he was stooping and fell backward onto his rear end per his report), and he has an orthopedic visit scheduled to get both knees checked out soon (has been getting gel injections in every 6 months). Past Medical History/Comorbidities:   Mr. Jackie Gagnon  has a past medical history of Arrhythmia; Arthritis; BPPV (benign paroxysmal positional vertigo); Carotid stenosis; Chronic kidney disease; Depression; Diabetes ([address] (dx 1995); GERD (gastroesophageal reflux disease); Hyperlipemia; Hypertension; Osteoarthritis, knee; PUD (peptic ulcer disease) (05/2016); TIA (transient ischemic attack) (2003); and Varicella. He also has no past medical history of Chronic obstructive pulmonary disease ([address] Mr. Jackie Gagnon  has a past surgical history that includes hx lumbar laminectomy; hx thoracic laminectomy (2004); hx carotid endarterectomy (2002); hx hemorrhoidectomy (2007); hx other surgical (2005); and hx colonoscopy (05/01/2016).   Social History/Living Environment:    lives alone in 2 story house with 10 steps to get upstairs with rail on L side as he goes up (only goes upstairs about once/week)  Prior Level of Function/Work/Activity:  Retired; walks at apartment complex that his sister lives in for about 1 hr about 3 times/week on treadmill (uses B  support); volunteers at soup kitchen 6 hours/week (2 days a week for 3 hours each day - has to stand for this time); goes to [employer]; pt has life alert on his wrist  Dominant Side:         RIGHT  Personal Factors:          Sex:  male        Age:  [de-identified] y.o. Current Medications:    Current Outpatient Prescriptions:     glipiZIDE (GLUCOTROL) 5 mg tablet, Take 1 Tab by mouth two (2) times a day., Disp: 180 Tab, Rfl: 1    losartan (COZAAR) 100 mg tablet, Take 1 Tab by mouth daily. , Disp: 90 Tab, Rfl: 1    pantoprazole (PROTONIX) 40 mg tablet, Take 1 Tab by mouth two (2) times a day., Disp: 180 Tab, Rfl: 1    simvastatin (ZOCOR) 20 mg tablet, Take 1 Tab by mouth nightly., Disp: 90 Tab, Rfl: 1    traZODone (DESYREL) 50 mg tablet, TAKE 1 TO 2 TABLETS BY MOUTH AT NIGHT AS NEEDED FOR SLEEP, Disp: 60 Tab, Rfl: 3    carvedilol (COREG) 12.5 mg tablet, Take 1 Tab by mouth two (2) times daily (with meals). , Disp: 180 Tab, Rfl: 1    clopidogrel (PLAVIX) 75 mg tab, Take 1 Tab by mouth daily. , Disp: 90 Tab, Rfl: 1    tadalafil (CIALIS) 5 mg tablet, Take 1 Tab by mouth daily as needed. , Disp: 30 Tab, Rfl: 3    desonide (TRIDESILON) 0.05 % cream, Apply  to affected area two (2) times a day., Disp: 45 g, Rfl: 0    psyllium (METAMUCIL) packet, Take 1 Packet by mouth as needed. , Disp: , Rfl:     carboxymethylcellulose sodium (REFRESH LIQUIGEL) 1 % dlgl, Apply  to eye two (2) times a day., Disp: , Rfl:     VOLTAREN 1 % gel, APPLY TOPICALLY QID, Disp: , Rfl: 1    BLOOD-GLUCOSE METER (FREESTYLE LITE METER), by Does Not Apply route., Disp: , Rfl:     LANCETS, by Does Not Apply route., Disp: , Rfl:     ASCENSIA CONTOUR strip, , Disp: , Rfl: 0    polyethylene glycol (MIRALAX) 17 gram/dose powder, Take 17 g by mouth as needed. , Disp: , Rfl:    Date Last Reviewed:  4/10/2018   # of Personal Factors/Comorbidities that affect the Plan of Care: 1-2: MODERATE COMPLEXITY   EXAMINATION:   Initial assessment on 3/28/2018  Observation/Orthostatic Postural Assessment:          Seated: kyphotic posture, forward head, rounded shoulders, posterior pelvic tilt        Standing: slight forward trunk lean  ROM:    Initial measurement: (on 3/28/2018) Initial measurement: (on 3/28/2018) Reassessment measurement:  Reassessment measurement:    L LE:  Hip ER and knee flexion PROM slightly limited at end range (knee flexion painful at end range);    Tight hamstrings and plantarflexors R LE: WFL throughout hip/knee/ankle;   Tight hamstrings and plantarflexors     Comments about UE ROM: WFL    Strength:    Initial measurement: (on 3/28/2018) Initial measurement: (on 3/28/2018) Reassessment measurement:  Reassessment measurement:    L LE:  Hip flexion: 4+/5  Hip extension: 3+/5  Hip abduction: 4/5  Hip adduction: 4-/5  Hip IR: 4/5 (pain in back)  Hip ER: 4/5   Knee flexion: 4-/5  Knee extension: 5/5  Ankle DF: 4+/5  Ankle PF: WFL R LE:  Hip flexion: 4+/5  Hip extension: 3+/5  Hip abduction: 5/5  Hip adduction: 4/5  Hip IR: 4/5  Hip ER: 4/5  Knee flexion: 4/5  Knee extension: 5/5  Ankle DF: 4+/5  Ankle PF: WFL     Comments about UE strength: slightly decreased at L UE but WNL    Neurological Screen:        Myotomes:  WFL        Dermatomes:  Slightly decreased sensation at lateral side of L foot  Functional Mobility:         Gait/Ambulation:  Pt ambulates with no assistive device with following gait deficits: slightly decreased B step length/heel strike/clearance, increased B hip IR at initial contact at each LE, decreased arm swing, increased forward trunk lean        Transfers:     Level of assist   Sit to stand Rosanne; pushes up to stand   Stand to sit Rosanne; reaches back to sit   Chair to mat independent   Mat to chair independent           Bed Mobility:     Level of assist   Sit to supine independent   Rolling R independent   Rolling L independent   Scooting up/down/sideways independent   Supine to sit independent     Balance:          good in sitting; good in static standing; fair in dynamic standing  Coordination:          No deficits noted  Mental Status:          WFL  Sensation:         Slightly decreased at lateral L foot   Body Structures Involved:  1. Nerves  2. Bones  3. Joints  4. Muscles  5. Ligaments Body Functions Affected:  1. Sensory/Pain  2. Neuromusculoskeletal  3. Movement Related Activities and Participation Affected:  1. General Tasks and Demands  2. Mobility  3. Self Care  4. Domestic Life  5. Interpersonal Interactions and Relationships  6. Community, Social and [place]   # of elements that affect the Plan of Care: 4+: HIGH COMPLEXITY   CLINICAL PRESENTATION:   Presentation: Evolving clinical presentation with changing clinical characteristics: MODERATE COMPLEXITY   CLINICAL DECISION MAKING:   Outcome Measure: Tool Used: Armas Balance Scale  Score:  Initial: 51/56 Most Recent: X/56 (Date: -- )   Interpretation of Score: Each section is scored on a 0-4 scale, 0 representing the patients inability to perform the task and 4 representing independence. The scores of each section are added together for a total score of 56. The higher the patients score, the more independent the patient is. Any score below 45 indicates increased risk for falls. Score 56 55-45 44-34 33-23 22-12 11-1 0   Modifier CH CI CJ CK CL CM CN     Tool Used: Functional Gait Assessment  Score:  Initial: 18/30  Most Recent: X/30 (Date: -- )   Interpretation of Score: This test is a modification of the Dynamic Gait Index. It is a 10 item test with each item score 0-3 that assesses postural stability during various walking tasks. Score 30 29-24 23-18 17-13 12-7 6-1 0   Modifier  CI CJ CK CL CM CN     ?  Mobility - Walking and Moving Around:     - CURRENT STATUS: CJ - 20%-39% impaired, limited or restricted    - GOAL STATUS: CI - 1%-19% impaired, limited or restricted    - D/C STATUS:  ---------------To be determined---------------  [place] / [place] / Product Type: Medicare /   Medical Necessity:   · Patient is expected to demonstrate progress in strength, range of motion, balance and functional technique to improve ability to perform safe ambulation. · Patient demonstrates good rehab potential due to higher previous functional level. Reason for Services/Other Comments:  · Patient continues to require modification of therapeutic interventions to increase complexity of exercises. Use of outcome tool(s) and clinical judgement create a POC that gives a: Questionable prediction of patient's progress: MODERATE COMPLEXITY   TREATMENT:   (In addition to Assessment/Re-Assessment sessions the following treatments were rendered)  Subjective comments at beginning of session: Pt states no pain today, just some tightness in his neck  THERAPEUTIC EXERCISE: (15 minutes):  Exercises per grid below to improve mobility, strength, balance and dynamic movement of leg - bilateral to improve functional ambulation. Required minimal visual, verbal and manual cues to promote proper body alignment, promote proper body posture and promote proper body mechanics. Progressed resistance, range, repetitions and complexity of movement as indicated. Date:  4/3/2018 Date:  4/10/2018 Date:   Activity/Exercise Parameters Parameters    Nustep L3 resistance; 10 minutes with B LEs only for strength/endurance L4 resistance; 10 minutes with B LEs only for strength/endurance    Ankle plantarflexor stretch on incline board 30 second hold x 3 reps with knees extended then flexed with B UE support 30 second hold x 3 reps with knees extended then flexed with B UE support                                  *given in HEP    NEUROMUSCULAR RE-EDUCATION: (25 minutes):  Exercise/activities per grid below to improve balance, coordination and posture. Required minimal visual, verbal and manual cues to promote static and dynamic balance in standing.    Date:  4/3/2018 Date:  4/10/2018 Date:   Activity/Exercise Parameters Parameters    Tapping on rocker board Anterior/posterior taps x 20 reps with B UE support progressing to minimal to no UE support with occasional CGA-Jeanette to correct losses of balance; lateral taps x 20 reps B UE support progressing quickly to no UE support with S only  Also progressed to eyes closed both directions with same levels of assist Feet in semi-tandem for anterior/posterior taps x 20 reps with B UE support progressing to minimal to no UE support with occasional CGA-Jeanette to correct losses of balance; feet apart for lateral taps x 20 reps no UE support with S only  Also progressed to eyes closed both directions with slightly increased level of assist (Jeanette) for anterior/posterior taps    Static standing balance on foam Tandem position each LE forward x 30 second trials eyes open with minimal to no UE use; 5-15 second trials eyes closed with occasional UE use; 10 reps/1 set looking up/down with occasional UE use; 10 reps/1 set looking L/R with occasional UE use Tandem position each LE forward x 30 second trials eyes open with minimal to no UE use; 5-15 second trials eyes closed with occasional UE use; 15 reps/1 set looking up/down with occasional UE use; 15 reps/1 set looking L/R with occasional UE use - cues for increased AROM with cervical movements as pt demonstrated improved balance                                  *given in HEP       Treatment/Session Assessment:  Pt continuing to demonstrate improved static standing balance as evident by his ability to perform balance exercises with increased difficulty or less lateral sways, but he continues to have increased difficulty with head turns or eyes closed. · Pain/ Symptoms: Initial:   No pain Post Session:  No change in pain noted at end of session ·   Compliance with Program/Exercises: Will assess as treatment progresses. · Recommendations/Intent for next treatment session:  \"Next visit will focus on advancements to more challenging activities and reduction in assistance provided\".  Work on gait training (especially with head movements or decreased vision); higher level balance activities; exercises with BIG movement  Total Treatment Duration:  PT Patient Time In/Time Out  Time In: 1035  Time Out: [address], DPT

## 2018-04-17 ENCOUNTER — HOSPITAL ENCOUNTER (OUTPATIENT)
Dept: PHYSICAL THERAPY | Age: 83
Discharge: HOME | End: 2018-04-17
Attending: INTERNAL MEDICINE
Payer: MEDICARE

## 2018-04-17 PROCEDURE — 97112 NEUROMUSCULAR REEDUCATION: CPT

## 2018-04-17 PROCEDURE — 97110 THERAPEUTIC EXERCISES: CPT

## 2018-04-17 NOTE — PROGRESS NOTES
Lindsey Robertson  : 1935  [place]  Secondary: [address] at [facility], [facility], [address]  [phone]   [phone]          OUTPATIENT PHYSICAL THERAPY:Daily Note 2018    ICD-10: Treatment Diagnosis: Difficulty in walking, not elsewhere classified (R26.2)   Unsteadiness on feet (R26.81)   Precautions/Allergies:   Zinc   Fall Risk Score: 7 (? 5 = High Risk)  MD Orders: Evaluate and treat MEDICAL/REFERRING DIAGNOSIS:  Gait abnormality [R26.9]   DATE OF ONSET: ongoing (but has gotten noticeably worse in the past year per pt report)  REFERRING PHYSICIAN: Paola Dave MD  RETURN PHYSICIAN APPOINTMENT: 2018   This section established at most recent assessment  INITIAL ASSESSMENT:  Lindsey Robertson is a [de-identified] y.o. male who presents to physical therapy for decreased balance with ambulation. This session, he demonstrated slightly decreased B LE strength/endurance, multiple postural and gait deficits, and decreased dynamic standing balance as evident by a score of 18/30 on the Functional Gait Assessment (scores of 23 or less indicating an increased risk for falls). Pt may benefit from skilled PT to address the above listed deficits to improve safety and independence with transfers and ambulation prior to discharge. PROBLEM LIST (Impacting functional limitations):  1. Decreased Strength  2. Decreased ADL/Functional Activities  3. Decreased Transfer Abilities  4. Decreased Ambulation Ability/Technique  5. Decreased Balance  6. Decreased Activity Tolerance  7. Decreased Flexibility/Joint Mobility INTERVENTIONS PLANNED:  1. Balance Exercise  2. Bed Mobility  3. Cold  4. Family Education  5. Gait Training  6. Heat  7. Home Exercise Program (HEP)  8. Manual Therapy  9. Neuromuscular Re-education/Strengthening  10. Range of Motion (ROM)  11. Therapeutic Activites  12.  Therapeutic Exercise/Strengthening  13. Transfer Training   TREATMENT PLAN:  Effective Dates: 3/28/2018 TO 5/27/2018 (60 days). Frequency/Duration: 1 time a week for 60 Days  GOALS: (Goals have been discussed and agreed upon with patient.)  Short-Term Functional Goals: Time Frame: 30 days  1. Pt will be compliant with HEP in order to increase LE strength/endurance/mobility to improve functional mobility and overall quality of life. 2. Pt will demonstrate increase in Armas Balance Score to 53/56 in order to increase static/dynamic standing balance to reduce fall risk and improve safety and independence with all standing activities. 3. Pt will improve score on Functional Gait Assessment to 21/30 in order to in order to increase stability and improve safety/independence when walking in varied environments. Discharge Goals: Time Frame: 60 days  1. Pt will be independent with HEP in order to increase LE strength/endurance/mobility to improve functional mobility and overall quality of life. 2. Pt will demonstrate increase in Armas Balance Score to 56/56 in order to increase static/dynamic standing balance to reduce fall risk and improve safety and independence with all standing activities. 3. Pt will improve score on Functional Gait Assessment to 24/30 in order to in order to increase stability and improve safety/independence when walking in varied environments. Rehabilitation Potential For Stated Goals: Good  Regarding Caroline Postal therapy, I certify that the treatment plan above will be carried out by a therapist or under their direction. Thank you for this referral,  Alicia Fagan DPT     Referring Physician Signature: Ros Purcell MD              Date                    HISTORY:   History of Present Injury/Illness (Reason for Referral): *History per pt or pt's family except where otherwise noted  Pt states his balance has been getting worse over the past several years.  States he feels like he bumps into walls, and when he walks he feels like his gait is a little \"slanted\". Pt states he walks with cane for community distances. He also has experienced vertigo when looking down before, but he has not experienced this for about 16 months per his report (states if he looks down he does get slightly dizzy). He also mentions that he gets a numbness in the L side of his face (had carotid surgery in 2004, but he had a sonagram for this yesterday with no positive results). He also has tinnitis and pain in his L ear. States he struggles with dry eyes, and has the most trouble in the mornings, and is not able to see very well for about 45 minutes. Pt states he has had one fall related to his knees (he was stooping and fell backward onto his rear end per his report), and he has an orthopedic visit scheduled to get both knees checked out soon (has been getting gel injections in every 6 months). Past Medical History/Comorbidities:   Mr. Zay Garza  has a past medical history of Arrhythmia; Arthritis; BPPV (benign paroxysmal positional vertigo); Carotid stenosis; Chronic kidney disease; Depression; Diabetes ([address] (dx 1995); GERD (gastroesophageal reflux disease); Hyperlipemia; Hypertension; Osteoarthritis, knee; PUD (peptic ulcer disease) (05/2016); TIA (transient ischemic attack) (2003); and Varicella. He also has no past medical history of Chronic obstructive pulmonary disease ([address] Mr. Zay Garza  has a past surgical history that includes hx lumbar laminectomy; hx thoracic laminectomy (2004); hx carotid endarterectomy (2002); hx hemorrhoidectomy (2007); hx other surgical (2005); and hx colonoscopy (05/01/2016).   Social History/Living Environment:    lives alone in 2 story house with 10 steps to get upstairs with rail on L side as he goes up (only goes upstairs about once/week)  Prior Level of Function/Work/Activity:  Retired; walks at apartment complex that his sister lives in for about 1 hr about 3 times/week on treadmill (uses B  support); volunteers at soup kitchen 6 hours/week (2 days a week for 3 hours each day - has to stand for this time); goes to [employer]; pt has life alert on his wrist  Dominant Side:         RIGHT  Personal Factors:          Sex:  male        Age:  [de-identified] y.o. Current Medications:    Current Outpatient Prescriptions:     glipiZIDE (GLUCOTROL) 5 mg tablet, Take 1 Tab by mouth two (2) times a day., Disp: 180 Tab, Rfl: 1    losartan (COZAAR) 100 mg tablet, Take 1 Tab by mouth daily. , Disp: 90 Tab, Rfl: 1    pantoprazole (PROTONIX) 40 mg tablet, Take 1 Tab by mouth two (2) times a day., Disp: 180 Tab, Rfl: 1    simvastatin (ZOCOR) 20 mg tablet, Take 1 Tab by mouth nightly., Disp: 90 Tab, Rfl: 1    traZODone (DESYREL) 50 mg tablet, TAKE 1 TO 2 TABLETS BY MOUTH AT NIGHT AS NEEDED FOR SLEEP, Disp: 60 Tab, Rfl: 3    carvedilol (COREG) 12.5 mg tablet, Take 1 Tab by mouth two (2) times daily (with meals). , Disp: 180 Tab, Rfl: 1    clopidogrel (PLAVIX) 75 mg tab, Take 1 Tab by mouth daily. , Disp: 90 Tab, Rfl: 1    tadalafil (CIALIS) 5 mg tablet, Take 1 Tab by mouth daily as needed. , Disp: 30 Tab, Rfl: 3    desonide (TRIDESILON) 0.05 % cream, Apply  to affected area two (2) times a day., Disp: 45 g, Rfl: 0    psyllium (METAMUCIL) packet, Take 1 Packet by mouth as needed. , Disp: , Rfl:     carboxymethylcellulose sodium (REFRESH LIQUIGEL) 1 % dlgl, Apply  to eye two (2) times a day., Disp: , Rfl:     VOLTAREN 1 % gel, APPLY TOPICALLY QID, Disp: , Rfl: 1    BLOOD-GLUCOSE METER (FREESTYLE LITE METER), by Does Not Apply route., Disp: , Rfl:     LANCETS, by Does Not Apply route., Disp: , Rfl:     ASCENSIA CONTOUR strip, , Disp: , Rfl: 0    polyethylene glycol (MIRALAX) 17 gram/dose powder, Take 17 g by mouth as needed. , Disp: , Rfl:    Date Last Reviewed:  4/17/2018   # of Personal Factors/Comorbidities that affect the Plan of Care: 1-2: MODERATE COMPLEXITY   EXAMINATION:   Initial assessment on 3/28/2018  Observation/Orthostatic Postural Assessment:          Seated: kyphotic posture, forward head, rounded shoulders, posterior pelvic tilt        Standing: slight forward trunk lean  ROM:    Initial measurement: (on 3/28/2018) Initial measurement: (on 3/28/2018) Reassessment measurement:  Reassessment measurement:    L LE:  Hip ER and knee flexion PROM slightly limited at end range (knee flexion painful at end range);    Tight hamstrings and plantarflexors R LE: WFL throughout hip/knee/ankle;   Tight hamstrings and plantarflexors     Comments about UE ROM: WFL    Strength:    Initial measurement: (on 3/28/2018) Initial measurement: (on 3/28/2018) Reassessment measurement:  Reassessment measurement:    L LE:  Hip flexion: 4+/5  Hip extension: 3+/5  Hip abduction: 4/5  Hip adduction: 4-/5  Hip IR: 4/5 (pain in back)  Hip ER: 4/5   Knee flexion: 4-/5  Knee extension: 5/5  Ankle DF: 4+/5  Ankle PF: WFL R LE:  Hip flexion: 4+/5  Hip extension: 3+/5  Hip abduction: 5/5  Hip adduction: 4/5  Hip IR: 4/5  Hip ER: 4/5  Knee flexion: 4/5  Knee extension: 5/5  Ankle DF: 4+/5  Ankle PF: WFL     Comments about UE strength: slightly decreased at L UE but WNL    Neurological Screen:        Myotomes:  WFL        Dermatomes:  Slightly decreased sensation at lateral side of L foot  Functional Mobility:         Gait/Ambulation:  Pt ambulates with no assistive device with following gait deficits: slightly decreased B step length/heel strike/clearance, increased B hip IR at initial contact at each LE, decreased arm swing, increased forward trunk lean        Transfers:     Level of assist   Sit to stand Rosanne; pushes up to stand   Stand to sit Rosanne; reaches back to sit   Chair to mat independent   Mat to chair independent           Bed Mobility:     Level of assist   Sit to supine independent   Rolling R independent   Rolling L independent   Scooting up/down/sideways independent   Supine to sit independent     Balance:          good in sitting; good in static standing; fair in dynamic standing  Coordination:          No deficits noted  Mental Status:          WFL  Sensation:         Slightly decreased at lateral L foot   Body Structures Involved:  1. Nerves  2. Bones  3. Joints  4. Muscles  5. Ligaments Body Functions Affected:  1. Sensory/Pain  2. Neuromusculoskeletal  3. Movement Related Activities and Participation Affected:  1. General Tasks and Demands  2. Mobility  3. Self Care  4. Domestic Life  5. Interpersonal Interactions and Relationships  6. Community, Social and [place]   # of elements that affect the Plan of Care: 4+: HIGH COMPLEXITY   CLINICAL PRESENTATION:   Presentation: Evolving clinical presentation with changing clinical characteristics: MODERATE COMPLEXITY   CLINICAL DECISION MAKING:   Outcome Measure: Tool Used: Armas Balance Scale  Score:  Initial: 51/56 Most Recent: X/56 (Date: -- )   Interpretation of Score: Each section is scored on a 0-4 scale, 0 representing the patients inability to perform the task and 4 representing independence. The scores of each section are added together for a total score of 56. The higher the patients score, the more independent the patient is. Any score below 45 indicates increased risk for falls. Score 56 55-45 44-34 33-23 22-12 11-1 0   Modifier CH CI CJ CK CL CM CN     Tool Used: Functional Gait Assessment  Score:  Initial: 18/30  Most Recent: X/30 (Date: -- )   Interpretation of Score: This test is a modification of the Dynamic Gait Index. It is a 10 item test with each item score 0-3 that assesses postural stability during various walking tasks. Score 30 29-24 23-18 17-13 12-7 6-1 0   Modifier  CI CJ CK CL CM CN     ?  Mobility - Walking and Moving Around:     - CURRENT STATUS: CJ - 20%-39% impaired, limited or restricted    - GOAL STATUS: CI - 1%-19% impaired, limited or restricted    - D/C STATUS:  ---------------To be determined---------------  [place] / [place] / Product Type: Medicare /   Medical Necessity:   · Patient is expected to demonstrate progress in strength, range of motion, balance and functional technique to improve ability to perform safe ambulation. · Patient demonstrates good rehab potential due to higher previous functional level. Reason for Services/Other Comments:  · Patient continues to require modification of therapeutic interventions to increase complexity of exercises. Use of outcome tool(s) and clinical judgement create a POC that gives a: Questionable prediction of patient's progress: MODERATE COMPLEXITY   TREATMENT:   (In addition to Assessment/Re-Assessment sessions the following treatments were rendered)  Subjective comments at beginning of session: Pt states no pain today  THERAPEUTIC EXERCISE: (15 minutes):  Exercises per grid below to improve mobility, strength, balance and dynamic movement of leg - bilateral to improve functional ambulation. Required minimal visual, verbal and manual cues to promote proper body alignment, promote proper body posture and promote proper body mechanics. Progressed resistance, range, repetitions and complexity of movement as indicated. Date:  4/3/2018 Date:  4/10/2018 Date:  4/17/2018   Activity/Exercise Parameters Parameters    Nustep L3 resistance; 10 minutes with B LEs only for strength/endurance L4 resistance; 10 minutes with B LEs only for strength/endurance L5 resistance; 10 minutes with B LEs only for strength/endurance   Ankle plantarflexor stretch on incline board 30 second hold x 3 reps with knees extended then flexed with B UE support 30 second hold x 3 reps with knees extended then flexed with B UE support 30 second hold x 3 reps with knees extended then flexed with B UE support                                 *given in HEP    NEUROMUSCULAR RE-EDUCATION: (25 minutes):  Exercise/activities per grid below to improve balance, coordination and posture. Required minimal visual, verbal and manual cues to promote static and dynamic balance in standing.    Date:  4/3/2018 Date:  4/10/2018 Date:  4/17/2018   Activity/Exercise Parameters Parameters    Tapping on rocker board Anterior/posterior taps x 20 reps with B UE support progressing to minimal to no UE support with occasional CGA-Jeanette to correct losses of balance; lateral taps x 20 reps B UE support progressing quickly to no UE support with S only  Also progressed to eyes closed both directions with same levels of assist Feet in semi-tandem for anterior/posterior taps x 20 reps with B UE support progressing to minimal to no UE support with occasional CGA-Jeanette to correct losses of balance; feet apart for lateral taps x 20 reps no UE support with S only  Also progressed to eyes closed both directions with slightly increased level of assist (Jeanette) for anterior/posterior taps    Static standing balance on foam Tandem position each LE forward x 30 second trials eyes open with minimal to no UE use; 5-15 second trials eyes closed with occasional UE use; 10 reps/1 set looking up/down with occasional UE use; 10 reps/1 set looking L/R with occasional UE use Tandem position each LE forward x 30 second trials eyes open with minimal to no UE use; 5-15 second trials eyes closed with occasional UE use; 15 reps/1 set looking up/down with occasional UE use; 15 reps/1 set looking L/R with occasional UE use - cues for increased AROM with cervical movements as pt demonstrated improved balance    Ambulation on treadmill with gait challenges   2.0 mph walking forward looking straight ahead with no UE support for 4-5 minutes (2 sets); walking forward pushing tread with treadmill turned off with B UE support for 2 minutes to encourage bigger steps between first 2 sets; 1.5 mph walking forward looking left/right/up/down with no UE support for 3 minutes; 0.5 mph walking backward for 10-15 seconds x 2 sets with B UE support   Ambulation over level ground with gait challenges   Over track for multiple bouts of 50-60 feet; cues throughout to [facility] left/right\", \"look up/down\", \"turn around\", \"start/stop\", \"walk fast/slow\"   Marching on foam   No UE support; 20 reps/1 set looking straight ahead with cues for slow movement; 10 reps/1 set looking left/right, 10 reps/1 set looking up/down, 10 reps/1 set with eyes closed; cues throughout for slower and more controlled movement, as well as increased hip flexion with each step to increase challenge               *given in HEP       Treatment/Session Assessment:  Pt able to perform more challenging higher balance activities this session, especially during ambulation. Will do short assessment at next session to see if pt is ready for discharge. · Pain/ Symptoms: Initial:   No pain Post Session:  No change in pain noted at end of session ·   Compliance with Program/Exercises: Will assess as treatment progresses. · Recommendations/Intent for next treatment session: \"Next visit will focus on advancements to more challenging activities and reduction in assistance provided\".  Work on gait training (especially with head movements or decreased vision); higher level balance activities; exercises with BIG movement  Total Treatment Duration:  PT Patient Time In/Time Out  Time In: 1030  Time Out: [address] [facility] [place], DPT

## 2018-04-24 ENCOUNTER — HOSPITAL ENCOUNTER (OUTPATIENT)
Dept: PHYSICAL THERAPY | Age: 83
Discharge: HOME | End: 2018-04-24
Attending: INTERNAL MEDICINE
Payer: MEDICARE

## 2018-04-24 PROCEDURE — G8979 MOBILITY GOAL STATUS: HCPCS

## 2018-04-24 PROCEDURE — G8980 MOBILITY D/C STATUS: HCPCS

## 2018-04-24 PROCEDURE — 97112 NEUROMUSCULAR REEDUCATION: CPT

## 2018-04-24 PROCEDURE — 97110 THERAPEUTIC EXERCISES: CPT

## 2018-04-24 NOTE — THERAPY DISCHARGE
Santos De La Fuente  : 1935  [place]  Secondary: [address] at [facility]  [address] [facility], [address] [place] [address]  [phone]   [phone]          OUTPATIENT PHYSICAL [facility] 2018    ICD-10: Treatment Diagnosis: Difficulty in walking, not elsewhere classified (R26.2)   Unsteadiness on feet (R26.81)   Precautions/Allergies:   Zinc   Fall Risk Score: 7 (? 5 = High Risk)  MD Orders: Evaluate and treat MEDICAL/REFERRING DIAGNOSIS:  Gait abnormality [R26.9]   DATE OF ONSET: ongoing (but has gotten noticeably worse in the past year per pt report)  REFERRING PHYSICIAN: Venkat Stover MD  RETURN PHYSICIAN APPOINTMENT: 2018   This section established at most recent assessment  INITIAL ASSESSMENT:  Santos De La Fuente has now attended 5 physical therapy sessions for decreased balance with ambulation. During this time, he has met 3 out of 3 of his short term goals and 1 out of 3 of his discharge goals (2 goals not met secondary to pt not progressing more with static/dynamic standing balance). Will discharge pt from physical therapy at this time secondary to pt request.   PROBLEM LIST (Impacting functional limitations):  1. Decreased Strength  2. Decreased ADL/Functional Activities  3. Decreased Transfer Abilities  4. Decreased Ambulation Ability/Technique  5. Decreased Balance  6. Decreased Activity Tolerance  7. Decreased Flexibility/Joint Mobility INTERVENTIONS PLANNED:  1. Balance Exercise  2. Bed Mobility  3. Cold  4. Family Education  5. Gait Training  6. Heat  7. Home Exercise Program (HEP)  8. Manual Therapy  9. Neuromuscular Re-education/Strengthening  10. Range of Motion (ROM)  11. Therapeutic Activites  12. Therapeutic Exercise/Strengthening  13. Transfer Training   TREATMENT PLAN:  Effective Dates: 3/28/2018 TO 2018 (60 days).  Frequency/Duration: 1 time a week for 60 Days  GOALS: (Goals have been discussed and agreed upon with patient.)  Short-Term Functional Goals: Time Frame: 30 days  1. Pt will be compliant with HEP in order to increase LE strength/endurance/mobility to improve functional mobility and overall quality of life. (MET 4/24/2018)  2. Pt will demonstrate increase in Ramas Balance Score to 53/56 in order to increase static/dynamic standing balance to reduce fall risk and improve safety and independence with all standing activities. (MET 4/24/2018)  3. Pt will improve score on Functional Gait Assessment to 21/30 in order to in order to increase stability and improve safety/independence when walking in varied environments. (MET 4/24/2018)  Discharge Goals: Time Frame: 60 days  1. Pt will be independent with HEP in order to increase LE strength/endurance/mobility to improve functional mobility and overall quality of life. (MET 4/24/2018)  2. Pt will demonstrate increase in Armas Balance Score to 56/56 in order to increase static/dynamic standing balance to reduce fall risk and improve safety and independence with all standing activities. (NOT MET 4/24/2018)  3. Pt will improve score on Functional Gait Assessment to 24/30 in order to in order to increase stability and improve safety/independence when walking in varied environments. (NOT MET 4/24/2018)  Rehabilitation Potential For Stated Goals: Good    Thank you for this referral,  Lily Fonseca DPT               HISTORY:   History of Present Injury/Illness (Reason for Referral): *History per pt or pt's family except where otherwise noted  Pt states his balance has been getting worse over the past several years. States he feels like he bumps into walls, and when he walks he feels like his gait is a little \"slanted\". Pt states he walks with cane for community distances. He also has experienced vertigo when looking down before, but he has not experienced this for about 16 months per his report (states if he looks down he does get slightly dizzy).  He also mentions that he gets a numbness in the L side of his face (had carotid surgery in 2004, but he had a sonagram for this yesterday with no positive results). He also has tinnitis and pain in his L ear. States he struggles with dry eyes, and has the most trouble in the mornings, and is not able to see very well for about 45 minutes. Pt states he has had one fall related to his knees (he was stooping and fell backward onto his rear end per his report), and he has an orthopedic visit scheduled to get both knees checked out soon (has been getting gel injections in every 6 months). Past Medical History/Comorbidities:   Mr. Margo Edmond  has a past medical history of Arrhythmia; Arthritis; BPPV (benign paroxysmal positional vertigo); Carotid stenosis; Chronic kidney disease; Depression; Diabetes ([address] (dx 1995); GERD (gastroesophageal reflux disease); Hyperlipemia; Hypertension; Osteoarthritis, knee; PUD (peptic ulcer disease) (05/2016); TIA (transient ischemic attack) (2003); and Varicella. He also has no past medical history of Chronic obstructive pulmonary disease ([address] Mr. Margo Edmond  has a past surgical history that includes hx lumbar laminectomy; hx thoracic laminectomy (2004); hx carotid endarterectomy (2002); hx hemorrhoidectomy (2007); hx other surgical (2005); and hx colonoscopy (05/01/2016). Social History/Living Environment:    lives alone in 2 story house with 10 steps to get upstairs with rail on L side as he goes up (only goes upstairs about once/week)  Prior Level of Function/Work/Activity:  Retired; walks at apartment complex that his sister lives in for about 1 hr about 3 times/week on treadmill (uses B UE support); volunteers at soup kitchen 6 hours/week (2 days a week for 3 hours each day - has to stand for this time); goes to [employer]; pt has life alert on his wrist  Dominant Side:         RIGHT  Personal Factors:          Sex:  male        Age:  [de-identified] y.o.   Current Medications:    Current Outpatient Prescriptions:     glipiZIDE (GLUCOTROL) 5 mg tablet, Take 1 Tab by mouth two (2) times a day., Disp: 180 Tab, Rfl: 1    losartan (COZAAR) 100 mg tablet, Take 1 Tab by mouth daily. , Disp: 90 Tab, Rfl: 1    pantoprazole (PROTONIX) 40 mg tablet, Take 1 Tab by mouth two (2) times a day., Disp: 180 Tab, Rfl: 1    simvastatin (ZOCOR) 20 mg tablet, Take 1 Tab by mouth nightly., Disp: 90 Tab, Rfl: 1    traZODone (DESYREL) 50 mg tablet, TAKE 1 TO 2 TABLETS BY MOUTH AT NIGHT AS NEEDED FOR SLEEP, Disp: 60 Tab, Rfl: 3    carvedilol (COREG) 12.5 mg tablet, Take 1 Tab by mouth two (2) times daily (with meals). , Disp: 180 Tab, Rfl: 1    clopidogrel (PLAVIX) 75 mg tab, Take 1 Tab by mouth daily. , Disp: 90 Tab, Rfl: 1    tadalafil (CIALIS) 5 mg tablet, Take 1 Tab by mouth daily as needed. , Disp: 30 Tab, Rfl: 3    desonide (TRIDESILON) 0.05 % cream, Apply  to affected area two (2) times a day., Disp: 45 g, Rfl: 0    psyllium (METAMUCIL) packet, Take 1 Packet by mouth as needed. , Disp: , Rfl:     carboxymethylcellulose sodium (REFRESH LIQUIGEL) 1 % dlgl, Apply  to eye two (2) times a day., Disp: , Rfl:     VOLTAREN 1 % gel, APPLY TOPICALLY QID, Disp: , Rfl: 1    BLOOD-GLUCOSE METER (FREESTYLE LITE METER), by Does Not Apply route., Disp: , Rfl:     LANCETS, by Does Not Apply route., Disp: , Rfl:     ASCENSIA CONTOUR strip, , Disp: , Rfl: 0    polyethylene glycol (MIRALAX) 17 gram/dose powder, Take 17 g by mouth as needed. , Disp: , Rfl:    Date Last Reviewed:  4/24/2018   # of Personal Factors/Comorbidities that affect the Plan of Care: 1-2: MODERATE COMPLEXITY   EXAMINATION:   Reassessment on 4/24/2018  Observation/Orthostatic Postural Assessment:          Seated: kyphotic posture, forward head, rounded shoulders, posterior pelvic tilt - improved upright posture on 4/24/2018 with cues        Standing: slight forward trunk lean - improved upright posture with cues  ROM:    Initial measurement: (on 3/28/2018) Initial measurement: (on 3/28/2018) Reassessment measurement:  Reassessment measurement:    L LE:  Hip ER and knee flexion PROM slightly limited at end range (knee flexion painful at end range);    Tight hamstrings and plantarflexors R LE: WFL throughout hip/knee/ankle;   Tight hamstrings and plantarflexors     Comments about UE ROM: WFL    Strength:    Initial measurement: (on 3/28/2018) Initial measurement: (on 3/28/2018) Reassessment measurement:  Reassessment measurement:    L LE:  Hip flexion: 4+/5  Hip extension: 3+/5  Hip abduction: 4/5  Hip adduction: 4-/5  Hip IR: 4/5 (pain in back)  Hip ER: 4/5   Knee flexion: 4-/5  Knee extension: 5/5  Ankle DF: 4+/5  Ankle PF: WFL R LE:  Hip flexion: 4+/5  Hip extension: 3+/5  Hip abduction: 5/5  Hip adduction: 4/5  Hip IR: 4/5  Hip ER: 4/5  Knee flexion: 4/5  Knee extension: 5/5  Ankle DF: 4+/5  Ankle PF: WFL     Comments about UE strength: slightly decreased at L UE but WNL    Neurological Screen:        Myotomes:  WFL        Dermatomes:  Slightly decreased sensation at lateral side of L foot  Functional Mobility:         Gait/Ambulation:  Pt ambulates with no assistive device with following gait deficits: slightly decreased B step length/heel strike/clearance, increased B hip IR at initial contact at each LE, decreased arm swing, increased forward trunk lean - improved B step length/heel strike/clearance on 4/24/2018 with improved arm swing and upright posture        Transfers:     Level of assist   Sit to stand Rosanne; pushes up to stand   Stand to sit Rosanne; reaches back to sit   Chair to mat independent   Mat to chair independent           Bed Mobility:     Level of assist   Sit to supine independent   Rolling R independent   Rolling L independent   Scooting up/down/sideways independent   Supine to sit independent     Balance:          good in sitting; good in static standing; good in dynamic standing  Coordination:          No deficits noted  Mental Status: WFL  Sensation:         Slightly decreased at lateral L foot   Body Structures Involved:  1. Nerves  2. Bones  3. Joints  4. Muscles  5. Ligaments Body Functions Affected:  1. Sensory/Pain  2. Neuromusculoskeletal  3. Movement Related Activities and Participation Affected:  1. General Tasks and Demands  2. Mobility  3. Self Care  4. Domestic Life  5. Interpersonal Interactions and Relationships  6. Community, Social and [place]   # of elements that affect the Plan of Care: 4+: HIGH COMPLEXITY   CLINICAL PRESENTATION:   Presentation: Evolving clinical presentation with changing clinical characteristics: MODERATE COMPLEXITY   CLINICAL DECISION MAKING:   Outcome Measure: Tool Used: Armas Balance Scale  Score:  Initial: 51/56 Most Recent: 55/56 (Date: 4/24/2018 )   Interpretation of Score: Each section is scored on a 0-4 scale, 0 representing the patients inability to perform the task and 4 representing independence. The scores of each section are added together for a total score of 56. The higher the patients score, the more independent the patient is. Any score below 45 indicates increased risk for falls. Score 56 55-45 44-34 33-23 22-12 11-1 0   Modifier CH CI CJ CK CL CM CN     Tool Used: Functional Gait Assessment  Score:  Initial: 18/30  Most Recent: 23/30 (Date: 4/24/2018 )   Interpretation of Score: This test is a modification of the Dynamic Gait Index. It is a 10 item test with each item score 0-3 that assesses postural stability during various walking tasks. Score 30 29-24 23-18 17-13 12-7 6-1 0   Modifier CH CI CJ CK CL CM CN     ?  Mobility - Walking and Moving Around:     - CURRENT STATUS: CJ - 20%-39% impaired, limited or restricted    - GOAL STATUS: CI - 1%-19% impaired, limited or restricted    - D/C STATUS:  CJ - 20%-39% impaired, limited or restricted  [place] / [place] / Product Type: Medicare /   Medical Necessity:   · Patient is expected to demonstrate progress in strength, range of motion, balance and functional technique to improve ability to perform safe ambulation. · Patient demonstrates good rehab potential due to higher previous functional level. Reason for Services/Other Comments:  · Patient continues to require modification of therapeutic interventions to increase complexity of exercises. Use of outcome tool(s) and clinical judgement create a POC that gives a: Questionable prediction of patient's progress: MODERATE COMPLEXITY   TREATMENT:   (In addition to Assessment/Re-Assessment sessions the following treatments were rendered)  Subjective comments at beginning of session: Pt states he is ready for discharge today  THERAPEUTIC EXERCISE: (15 minutes):  Exercises per grid below to improve mobility, strength, balance and dynamic movement of leg - bilateral to improve functional ambulation. Required minimal visual, verbal and manual cues to promote proper body alignment, promote proper body posture and promote proper body mechanics. Progressed resistance, range, repetitions and complexity of movement as indicated. Date:  4/10/2018 Date:  4/17/2018 Date:  4/24/2018   Activity/Exercise Parameters     Nustep L4 resistance; 10 minutes with B LEs only for strength/endurance L5 resistance; 10 minutes with B LEs only for strength/endurance L5 resistance; 10 minutes with B LEs only for strength/endurance   Ankle plantarflexor stretch on incline board 30 second hold x 3 reps with knees extended then flexed with B UE support 30 second hold x 3 reps with knees extended then flexed with B UE support 30 second hold x 3 reps with knees extended then flexed with B UE support                                 *given in HEP    NEUROMUSCULAR RE-EDUCATION: (25 minutes):  Exercise/activities per grid below and assessment above on 4/24/2018 to improve balance, coordination and posture.   Required minimal visual, verbal and manual cues to promote static and dynamic balance in standing.    Date:  4/10/2018 Date:  4/17/2018 Date:  4/24/2018   Activity/Exercise Parameters     Tapping on rocker board Feet in semi-tandem for anterior/posterior taps x 20 reps with B UE support progressing to minimal to no UE support with occasional CGA-Jeanette to correct losses of balance; feet apart for lateral taps x 20 reps no UE support with S only  Also progressed to eyes closed both directions with slightly increased level of assist (Jeanette) for anterior/posterior taps     Static standing balance on foam Tandem position each LE forward x 30 second trials eyes open with minimal to no UE use; 5-15 second trials eyes closed with occasional UE use; 15 reps/1 set looking up/down with occasional UE use; 15 reps/1 set looking L/R with occasional UE use - cues for increased AROM with cervical movements as pt demonstrated improved balance     Ambulation on treadmill with gait challenges  2.0 mph walking forward looking straight ahead with no UE support for 4-5 minutes (2 sets); walking forward pushing tread with treadmill turned off with B UE support for 2 minutes to encourage bigger steps between first 2 sets; 1.5 mph walking forward looking left/right/up/down with no UE support for 3 minutes; 0.5 mph walking backward for 10-15 seconds x 2 sets with B UE support    Ambulation over level ground with gait challenges  Over track for multiple bouts of 50-60 feet; cues throughout to [employer] left/right\", \"look up/down\", \"turn around\", \"start/stop\", \"walk fast/slow\" About 15 minutes per FGA above (see details in paper chart)   Marching on foam  No UE support; 20 reps/1 set looking straight ahead with cues for slow movement; 10 reps/1 set looking left/right, 10 reps/1 set looking up/down, 10 reps/1 set with eyes closed; cues throughout for slower and more controlled movement, as well as increased hip flexion with each step to increase challenge    Static/dynamic standing balance activities on firm ground   10 minutes per Bethany Kim Balance Assessment above (see details in electronic chart)         *given in HEP       Treatment/Session Assessment:  See assessment above.   · Pain/ Symptoms: Initial:   No pain Post Session:  No change in pain noted at end of session ·   Compliance with Program/Exercises: Good compliance with attending scheduled sessions and performing HEP    Total Treatment Duration:  PT Patient Time In/Time Out  Time In: 1020  Time Out: [place], DPT

## 2018-04-24 NOTE — PROGRESS NOTES
ROBLES BALANCE SCALE  14-Item Long Form Original Version  1. SITTING TO STANDING  INSTRUCTIONS: Please stand up. Try not to use your hands for support. [x] (4) able to stand without using hands and stabilize independently  [] (3) able to stand independently using hands  [] (2) able to stand using hands after several tries  [] (1) needs minimal aid to stand or to stabilize  [] (0) needs moderate or maximal assist to stand  2. STANDING UNSUPPORTED  INSTRUCTIONS: Please stand for two minutes without holding. [x] (4) able to stand safely 2 minutes  [] (3) able to stand 2 minutes with supervision  [] (2) able to stand 30 seconds unsupported  [] (1) needs several tries to stand 30 seconds unsupported  [] (0) unable to stand 30 seconds unassisted  3. SITTING WITH BACK UNSUPPORTED BUT FEET SUPPORTED ON FLOOR OR ON A STOOL  INSTRUCTIONS: Please sit with arms folded for 2 minutes. [x] (4) able to sit safely and securely 2 minutes  [] (3) able to sit 2 minutes under supervision  [] (2) able to sit 30 seconds  [] (1) able to sit 10 seconds  [] (0) unable to sit without support 10 seconds  4. STANDING TO SITTING  INSTRUCTIONS: Please sit down. [x] (4) sits safely with minimal use of hands  [] (3) controls descent by using hands  [] (2) uses back of legs against chair to control descent  [] (1) sits independently but has uncontrolled descent  [] (0) needs assistance to sit  5. TRANSFERS  INSTRUCTIONS: Arrange chair(s) for a pivot transfer. Ask subject to transfer one way toward a seat with armrests and one way toward a seat without armrests. You may use two chairs (one with and one without armrests) or a bed and a chair. [x] (4) able to transfer safely with minor use of hands  [] (3) able to transfer safely definite need of hands  [] (2) able to transfer with verbal cueing and/or supervision  [] (1) needs one person to assist  [] (0) needs two people to assist or supervise to be safe  6.  STANDING UNSUPPORTED WITH EYES CLOSED  INSTRUCTIONS: Please close your eyes and stand still for 10 seconds. [x] (4) able to stand 10 seconds safely  [] (3) able to stand 10 seconds with supervision  [] (2) able to stand 3 seconds  [] (1) unable to keep eyes closed 3 seconds but stays steady  [] (0) needs help to keep from falling  7. STANDING UNSUPPORTED WITH FEET TOGETHER  INSTRUCTIONS: Place your feet together and stand without holding. [x] (4) able to place feet together independently and stand 1 minute safely  [] (3) able to place feet together independently and stand for 1 minute with supervision  [] (2) able to place feet together independently but unable to hold for 30 seconds  [] (1) needs help to attain position but able to stand 15 seconds feet together  [] (0) needs help to attain position and unable to hold for 15 seconds  8. REACHING FORWARD WITH OUTSTRETCHED ARM WHILE STANDING  INSTRUCTIONS: Lift arm to 90 degrees. Stretch out your fingers and reach forward as far as you can. (Examiner places a ruler at end of fingertips when arm is at 90 degrees. Fingers should not touch the ruler while reaching forward. The recorded measure is the distance forward that the finger reaches while the subject is in the most forward lean position. When possible, ask subject to use both arms when reaching to avoid rotation of the trunk.)  [] (4) can reach forward confidently > 25 cm (10 inches)  [x] (3) can reach forward > 12 cm safely (5 inches)  [] (2) can reach forward > 5 cm safely (2 inches)  [] (1) reaches forward but needs supervision  [] (0) loses balance while trying/requires external support  9.  OBJECT FROM FLOOR FROM A STANDING POSITION  INSTRUCTIONS:  shoe/slipper which is placed in front of your feet.   [x] (4) able to  slipper safely and easily   [] (3) able to  slipper but needs supervision  [] (2) unable to  but reaches 2-5 cm (1-2 inches) from slipper and keeps balance independently  [] (1) unable to  and needs supervision while trying  [] (0) unable to try/needs assist to keep from losing balance or falling  10. TURNING TO LOOK BEHIND OVER LEFT AND RIGHT SHOULDERS WHILE STANDING  INSTRUCTIONS: Turn to look directly behind you over toward left shoulder. Repeat to the right. Examiner may pick an object to look at directly behind the subject to encourage a better twist turn. [x] (4) looks behind from both sides and weight shifts well  [] (3) looks behind one side only other side shows less weight shift  [] (2) turns sideways only but maintains balance  [] (1) needs supervision when turning  [] (0) needs assist to keep from losing balance or falling  11. TURN 360 DEGREES  INSTRUCTIONS: Turn completely around in a full [place]. Pause. Then turn a full [place] in the other direction. [x] (4) able to turn 360 degrees safely in 4 seconds or less  [] (3) able to turn 360 degrees safely one side only in 4 seconds or less  [] (2) able to turn 360 degrees safely but slowly  [] (1) needs close supervision or verbal cueing  [] (0) needs assistance while turning  12. PLACING ALTERNATE FOOT ON STEP OR STOOL WHILE STANDING UNSUPPORTED  INSTRUCTIONS: Place each foot alternately on the step/stool. Continue until each foot has touched the step/stool four times. [x] (4) able to stand independently and safely and complete 8 steps in 20 seconds  [] (3) able to stand independently and complete 8 steps > 20 seconds  [] (2) able to complete 4 steps without aid with supervision  [] (1) able to complete > 2 steps needs minimal assist  [] (0) needs assistance to keep from falling/unable to try  13. STANDING UNSUPPORTED ONE FOOT IN FRONT   INSTRUCTIONS: (DEMONSTRATE TO SUBJECT) Place one foot directly in front of the other.  If you feel that you cannot place your foot directly in front, try to step far enough ahead that the heel of your forward foot is ahead of the toes of the other foot (to score 3 points, the length of the step should exceed the length of the other foot and the width of the stance should approximate the subject's normal stride width)  [x] (4) able to place foot tandem independently and hold 30 seconds  [] (3) able to place foot ahead of other independently and hold 30 seconds  [] (2) able to take small step independently and hold 30 seconds  [] (1) needs help to step but can hold 15 seconds  [] (0) loses balance while stepping or standing  14.  STANDING ON ONE LEG  INSTRUCTIONS: Stand on one leg as long as you can without holding  [x] (4) able to lift leg independently and hold >10 seconds  [] (3) able to lift leg independently and hold 5-10 seconds  [] (2) able to lift leg independently and hold = or >3 seconds  [] (1) tries to lift leg unable to hold 3 seconds but remains standing independently  [] (0) unable to try or needs assist to prevent fall    Total Score: 55/56 (a person scoring below 45 is considered to be at risk for falling)

## 2018-06-12 ENCOUNTER — APPOINTMENT (OUTPATIENT)
Dept: GENERAL RADIOLOGY | Age: 83
End: 2018-06-12
Attending: EMERGENCY MEDICINE
Payer: MEDICARE

## 2018-06-12 ENCOUNTER — APPOINTMENT (OUTPATIENT)
Dept: CT IMAGING | Age: 83
End: 2018-06-12
Attending: EMERGENCY MEDICINE
Payer: MEDICARE

## 2018-06-12 ENCOUNTER — HOSPITAL ENCOUNTER (EMERGENCY)
Age: 83
Discharge: HOME | End: 2018-06-12
Attending: EMERGENCY MEDICINE
Payer: MEDICARE

## 2018-06-12 VITALS
OXYGEN SATURATION: 98 % | HEART RATE: 57 BPM | HEIGHT: 63 IN | TEMPERATURE: 98.2 F | WEIGHT: 135 LBS | RESPIRATION RATE: 16 BRPM | BODY MASS INDEX: 23.92 KG/M2 | DIASTOLIC BLOOD PRESSURE: 87 MMHG | SYSTOLIC BLOOD PRESSURE: 198 MMHG

## 2018-06-12 DIAGNOSIS — R51.9 LEFT-SIDED HEADACHE: Primary | ICD-10-CM

## 2018-06-12 DIAGNOSIS — M54.2 NECK PAIN ON LEFT SIDE: ICD-10-CM

## 2018-06-12 DIAGNOSIS — R03.0 ELEVATED BLOOD PRESSURE READING: ICD-10-CM

## 2018-06-12 LAB
ALBUMIN SERPL-MCNC: 3.7 G/DL (ref 3.2–4.6)
ALBUMIN/GLOB SERPL: 0.9 {RATIO} (ref 1.2–3.5)
ALP SERPL-CCNC: 94 U/L (ref 50–136)
ALT SERPL-CCNC: 17 U/L (ref 12–65)
ANION GAP SERPL CALC-SCNC: 8 MMOL/L (ref 7–16)
AST SERPL-CCNC: 18 U/L (ref 15–37)
ATRIAL RATE: 55 BPM
BASOPHILS # BLD: 0 K/UL (ref 0–0.2)
BASOPHILS NFR BLD: 1 % (ref 0–2)
BILIRUB SERPL-MCNC: 0.6 MG/DL (ref 0.2–1.1)
BUN SERPL-MCNC: 18 MG/DL (ref 8–23)
CALCIUM SERPL-MCNC: 8.8 MG/DL (ref 8.3–10.4)
CALCULATED P AXIS, ECG09: 76 DEGREES
CALCULATED R AXIS, ECG10: 44 DEGREES
CALCULATED T AXIS, ECG11: 14 DEGREES
CHLORIDE SERPL-SCNC: 106 MMOL/L (ref 98–107)
CO2 SERPL-SCNC: 28 MMOL/L (ref 21–32)
CREAT SERPL-MCNC: 1.52 MG/DL (ref 0.8–1.5)
DIAGNOSIS, 93000: NORMAL
DIFFERENTIAL METHOD BLD: ABNORMAL
EOSINOPHIL # BLD: 0.4 K/UL (ref 0–0.8)
EOSINOPHIL NFR BLD: 8 % (ref 0.5–7.8)
ERYTHROCYTE [DISTWIDTH] IN BLOOD BY AUTOMATED COUNT: 12.8 % (ref 11.9–14.6)
GLOBULIN SER CALC-MCNC: 4 G/DL (ref 2.3–3.5)
GLUCOSE SERPL-MCNC: 131 MG/DL (ref 65–100)
HCT VFR BLD AUTO: 35.2 % (ref 41.1–50.3)
HGB BLD-MCNC: 12.7 G/DL (ref 13.6–17.2)
IMM GRANULOCYTES # BLD: 0 K/UL (ref 0–0.5)
IMM GRANULOCYTES NFR BLD AUTO: 0 % (ref 0–5)
LYMPHOCYTES # BLD: 2 K/UL (ref 0.5–4.6)
LYMPHOCYTES NFR BLD: 43 % (ref 13–44)
MCH RBC QN AUTO: 31.4 PG (ref 26.1–32.9)
MCHC RBC AUTO-ENTMCNC: 36.1 G/DL (ref 31.4–35)
MCV RBC AUTO: 87.1 FL (ref 79.6–97.8)
MONOCYTES # BLD: 0.3 K/UL (ref 0.1–1.3)
MONOCYTES NFR BLD: 7 % (ref 4–12)
NEUTS SEG # BLD: 1.9 K/UL (ref 1.7–8.2)
NEUTS SEG NFR BLD: 41 % (ref 43–78)
P-R INTERVAL, ECG05: 164 MS
PLATELET # BLD AUTO: 171 K/UL (ref 150–450)
PMV BLD AUTO: 11 FL (ref 10.8–14.1)
POTASSIUM SERPL-SCNC: 4.3 MMOL/L (ref 3.5–5.1)
PROT SERPL-MCNC: 7.7 G/DL (ref 6.3–8.2)
Q-T INTERVAL, ECG07: 424 MS
QRS DURATION, ECG06: 84 MS
QTC CALCULATION (BEZET), ECG08: 405 MS
RBC # BLD AUTO: 4.04 M/UL (ref 4.23–5.67)
SODIUM SERPL-SCNC: 142 MMOL/L (ref 136–145)
TROPONIN I SERPL-MCNC: <0.02 NG/ML (ref 0.02–0.05)
VENTRICULAR RATE, ECG03: 55 BPM
WBC # BLD AUTO: 4.5 K/UL (ref 4.3–11.1)

## 2018-06-12 PROCEDURE — 85025 COMPLETE CBC W/AUTO DIFF WBC: CPT | Performed by: EMERGENCY MEDICINE

## 2018-06-12 PROCEDURE — 70450 CT HEAD/BRAIN W/O DYE: CPT

## 2018-06-12 PROCEDURE — 74011250637 HC RX REV CODE- 250/637: Performed by: EMERGENCY MEDICINE

## 2018-06-12 PROCEDURE — 84484 ASSAY OF TROPONIN QUANT: CPT | Performed by: EMERGENCY MEDICINE

## 2018-06-12 PROCEDURE — 80053 COMPREHEN METABOLIC PANEL: CPT | Performed by: EMERGENCY MEDICINE

## 2018-06-12 PROCEDURE — 71046 X-RAY EXAM CHEST 2 VIEWS: CPT

## 2018-06-12 PROCEDURE — 93005 ELECTROCARDIOGRAM TRACING: CPT | Performed by: EMERGENCY MEDICINE

## 2018-06-12 PROCEDURE — 99285 EMERGENCY DEPT VISIT HI MDM: CPT | Performed by: EMERGENCY MEDICINE

## 2018-06-12 RX ORDER — HYDROCHLOROTHIAZIDE 25 MG/1
25 TABLET ORAL DAILY
Qty: 10 TAB | Refills: 2 | Status: SHIPPED | OUTPATIENT
Start: 2018-06-12 | End: 2018-06-25 | Stop reason: ALTCHOICE

## 2018-06-12 RX ORDER — GABAPENTIN 100 MG/1
CAPSULE ORAL
Qty: 90 CAP | Refills: 0 | Status: SHIPPED | OUTPATIENT
Start: 2018-06-12 | End: 2018-06-25 | Stop reason: SINTOL

## 2018-06-12 RX ORDER — LOSARTAN POTASSIUM 50 MG/1
100 TABLET ORAL
Status: COMPLETED | OUTPATIENT
Start: 2018-06-12 | End: 2018-06-12

## 2018-06-12 RX ADMIN — LOSARTAN POTASSIUM 100 MG: 50 TABLET ORAL at 13:45

## 2018-06-12 NOTE — ED PROVIDER NOTES
[facility] Emergency Department  Seen  @ [facility] EMERGENCY DEPT in room ER04/04    Chief Complaint   Patient presents with    Headache       HPI:   Lizett Florence is a [de-identified] y.o. male who complaints of eft-sided headache. Tightness. Runs down the left side of his body. No alleviating. Comes and goes. No slurred speech. Nofocal weakness. Not dropping things. Occasional balance problems and no falls. Started on Sunday. He also reports some intermittent chest pain last a few seconds and then resolves completely. Jahaira Cheney        Historian: patient    Review of Systems:    CONST:  Denies: fever, chills, weakness,   ENT: Denies: sore throat, earache, nasal congestion,   EYES: Denies: vision changes, double vision, discharge,   CARDIO: Denies: chest pain, palpitations,    RESP: Denies: cough, shortness of breath, dyspnea on exertion,   GI: Denies: abdominal pain, nausea, vomiting, diarrhea, melena, hematochezia,   : Denies: dysuria, hematuria, urinary frequency,   MUSC: Denies: muscle aches, joint pain,   SKIN: Denies: hives, rash,   PSYCH: Denies: anxiety, depression,   ENDO: Denies: polyuria, polydipsia,   Heme/Lymph:      Denies: easy bruising, bleeding,   NEURO: headache    Past Medical History:  Primary Care Doctor: Robert Whatley MD    Past Medical History:   Diagnosis Date    Arrhythmia     since a child, benign per patient     Arthritis     BPPV (benign paroxysmal positional vertigo)     Carotid stenosis     Hx of left endarterectomy    Chronic kidney disease     long hx of renal insufficiency    Depression     much improved at this time    Diabetes ([facility] [address] dx 1995    typell, avgfbs 100, check glucose 3 times a week, last A1C was 7, does not recognize lows    GERD (gastroesophageal reflux disease)     controlled by medication    Hyperlipemia     Hypertension     Osteoarthritis, knee     PUD (peptic ulcer disease) 05/2016    treated with protonix    TIA (transient ischemic attack) 2003     my weakness on left side    Varicella        Past Surgical History:   Procedure Laterality Date    HX CAROTID ENDARTERECTOMY  2002    Left Sided    HX COLONOSCOPY  05/01/2016    HX HEMORRHOIDECTOMY  2007    HX LUMBAR LAMINECTOMY      T5-T6    HX OTHER SURGICAL  2005    Sinus    HX THORACIC LAMINECTOMY  2004 1990's        Social History     Social History    Marital status:      Spouse name: N/A    Number of children: N/A    Years of education: N/A     Occupational History    Retired [employer]      Social History Main Topics    Smoking status: Former Smoker     Years: 20.00     Types: Pipe     Quit date: 8/11/1986    Smokeless tobacco: Never Used      Comment: pipe smoker 2 abeba/day    Alcohol use 0.0 oz/week     0 Standard drinks or equivalent per week      Comment: Annette once per month    Drug use: No    Sexual activity: Yes     Partners: Female     Other Topics Concern    None     Social History Narrative    Has 2 sons. Used to live in [place], [place] and [place], [place]. Previous Medications    ASCENSIA CONTOUR STRIP        BLOOD-GLUCOSE METER (FREESTYLE LITE METER)    by Does Not Apply route. CARBOXYMETHYLCELLULOSE SODIUM (REFRESH LIQUIGEL) 1 % DLGL    Apply  to eye two (2) times a day. CARVEDILOL (COREG) 12.5 MG TABLET    Take 1 Tab by mouth two (2) times daily (with meals). CLOPIDOGREL (PLAVIX) 75 MG TAB    Take 1 Tab by mouth daily. DESONIDE (TRIDESILON) 0.05 % CREAM    Apply  to affected area two (2) times a day. GLIPIZIDE (GLUCOTROL) 5 MG TABLET    Take 1 Tab by mouth two (2) times a day. LANCETS    by Does Not Apply route. LOSARTAN (COZAAR) 100 MG TABLET    Take 1 Tab by mouth daily. PANTOPRAZOLE (PROTONIX) 40 MG TABLET    Take 1 Tab by mouth two (2) times a day. POLYETHYLENE GLYCOL (MIRALAX) 17 GRAM/DOSE POWDER    Take 17 g by mouth as needed. PSYLLIUM (METAMUCIL) PACKET    Take 1 Packet by mouth as needed.     SIMVASTATIN (ZOCOR) 20 MG TABLET    Take 1 Tab by mouth nightly. TADALAFIL (CIALIS) 5 MG TABLET    Take 1 Tab by mouth daily as needed. TRAZODONE (DESYREL) 50 MG TABLET    TAKE 1 TO 2 TABLETS BY MOUTH AT NIGHT AS NEEDED FOR SLEEP    VOLTAREN 1 % GEL    APPLY TOPICALLY QID       Allergies   Allergen Reactions    Zinc Rash       Physical Exam:    Vitals:    06/12/18 1024 06/12/18 1032 06/12/18 1101 06/12/18 1311   BP: 185/78 186/77 184/74 (!) 220/88   Pulse: (!) 55   (!) 51   Resp:       Temp:       SpO2: 99%   98%     Vital signs were reviewed. Pulse oximetry interpretation: normal    Constitutional: well appearing, nontoxic,   Head: Atraumatic, normo-cephalic,   Ears/Nose/Throat: throat clear, mucous membranes moist,   Eyes: PERRL, EOMI, anicteric,   Neck: supple, FROM, no lymphadenopathy, no meningismus,   Cardiovascular: regular rate and rhythm, no murmur, 2+ radial pulses,    Respiratory: clear to auscultation with no wheezes, rales, ronchi,   Back:  FROM, no CVA tenderness,   Abdomen: soft, non-tender, no guarding/rebound, no percussion tenderness,    Musculoskeletal: no deformities, edema,   Skin: dry, no rashes,   Neurologic: alert, oriented, answers questions follows commands, tongue and facial movements are intact. Extraocular movements are intact.  are equal.  No pronator drift. Normal finger to nose.   Bilateral lower extremity strength is normal.  Psychiatric: Speech pattern and content normal,      _____________________________________________________________________  Medical Decision Making:    Differential Diagnosis for this patient includes: headache, elevated blood pressure, trigeminal neuralgia or other neuralgia,    This is a new problem that does need additional workup  Labs/Radiographs/ECG were ordered: yes  Old records were reviewed: yes      The patient's problem is: acute complicated  The Diagnostic Options are: minimal risk  The Management Options are: moderate risk  ______________________________________________________________________  ED Evaluation    Labs:   Recent Results (from the past 8 hour(s))   EKG, 12 LEAD, INITIAL    Collection Time: 06/12/18  9:44 AM   Result Value Ref Range    Ventricular Rate 55 BPM    Atrial Rate 55 BPM    P-R Interval 164 ms    QRS Duration 84 ms    Q-T Interval 424 ms    QTC Calculation (Bezet) 405 ms    Calculated P Axis 76 degrees    Calculated R Axis 44 degrees    Calculated T Axis 14 degrees    Diagnosis       Sinus bradycardia  Nonspecific T wave abnormality  Abnormal ECG  No previous ECGs available     TROPONIN I    Collection Time: 06/12/18  9:56 AM   Result Value Ref Range    Troponin-I, Qt. <0.02 (L) 0.02 - 0.05 NG/ML   CBC WITH AUTOMATED DIFF    Collection Time: 06/12/18  9:56 AM   Result Value Ref Range    WBC 4.5 4.3 - 11.1 K/uL    RBC 4.04 (L) 4.23 - 5.67 M/uL    HGB 12.7 (L) 13.6 - 17.2 g/dL    HCT 35.2 (L) 41.1 - 50.3 %    MCV 87.1 79.6 - 97.8 FL    MCH 31.4 26.1 - 32.9 PG    MCHC 36.1 (H) 31.4 - 35.0 g/dL    RDW 12.8 11.9 - 14.6 %    PLATELET 607 409 - 209 K/uL    MPV 11.0 10.8 - 14.1 FL    DF AUTOMATED      NEUTROPHILS 41 (L) 43 - 78 %    LYMPHOCYTES 43 13 - 44 %    MONOCYTES 7 4.0 - 12.0 %    EOSINOPHILS 8 (H) 0.5 - 7.8 %    BASOPHILS 1 0.0 - 2.0 %    IMMATURE GRANULOCYTES 0 0.0 - 5.0 %    ABS. NEUTROPHILS 1.9 1.7 - 8.2 K/UL    ABS. LYMPHOCYTES 2.0 0.5 - 4.6 K/UL    ABS. MONOCYTES 0.3 0.1 - 1.3 K/UL    ABS. EOSINOPHILS 0.4 0.0 - 0.8 K/UL    ABS. BASOPHILS 0.0 0.0 - 0.2 K/UL    ABS. IMM.  GRANS. 0.0 0.0 - 0.5 K/UL   METABOLIC PANEL, COMPREHENSIVE    Collection Time: 06/12/18  9:56 AM   Result Value Ref Range    Sodium 142 136 - 145 mmol/L    Potassium 4.3 3.5 - 5.1 mmol/L    Chloride 106 98 - 107 mmol/L    CO2 28 21 - 32 mmol/L    Anion gap 8 7 - 16 mmol/L    Glucose 131 (H) 65 - 100 mg/dL    BUN 18 8 - 23 MG/DL    Creatinine 1.52 (H) 0.8 - 1.5 MG/DL    GFR est AA 57 (L) >60 ml/min/1.73m2    GFR est non-AA 47 (L) >60 ml/min/1.73m2    Calcium 8.8 8.3 - 10.4 MG/DL    Bilirubin, total 0.6 0.2 - 1.1 MG/DL    ALT (SGPT) 17 12 - 65 U/L    AST (SGOT) 18 15 - 37 U/L    Alk. phosphatase 94 50 - 136 U/L    Protein, total 7.7 6.3 - 8.2 g/dL    Albumin 3.7 3.2 - 4.6 g/dL    Globulin 4.0 (H) 2.3 - 3.5 g/dL    A-G Ratio 0.9 (L) 1.2 - 3.5         Labs were reviewed and interpreted by me. Radiology studies performed:   CT HEAD WO CONT   Final Result   IMPRESSION:  No acute CT findings in the brain. XR CHEST PA LAT   Final Result   IMPRESSION:  No acute findings. Radiographs were visualized by me      Current Facility-Administered Medications   Medication Dose Route Frequency    losartan (COZAAR) tablet 100 mg  100 mg Oral NOW     Current Outpatient Prescriptions   Medication Sig    glipiZIDE (GLUCOTROL) 5 mg tablet Take 1 Tab by mouth two (2) times a day.  losartan (COZAAR) 100 mg tablet Take 1 Tab by mouth daily.  pantoprazole (PROTONIX) 40 mg tablet Take 1 Tab by mouth two (2) times a day.  simvastatin (ZOCOR) 20 mg tablet Take 1 Tab by mouth nightly.  traZODone (DESYREL) 50 mg tablet TAKE 1 TO 2 TABLETS BY MOUTH AT NIGHT AS NEEDED FOR SLEEP    carvedilol (COREG) 12.5 mg tablet Take 1 Tab by mouth two (2) times daily (with meals).  clopidogrel (PLAVIX) 75 mg tab Take 1 Tab by mouth daily.  tadalafil (CIALIS) 5 mg tablet Take 1 Tab by mouth daily as needed.  desonide (TRIDESILON) 0.05 % cream Apply  to affected area two (2) times a day.  psyllium (METAMUCIL) packet Take 1 Packet by mouth as needed.  carboxymethylcellulose sodium (REFRESH LIQUIGEL) 1 % dlgl Apply  to eye two (2) times a day.  VOLTAREN 1 % gel APPLY TOPICALLY QID    BLOOD-GLUCOSE METER (FREESTYLE LITE METER) by Does Not Apply route.  LANCETS by Does Not Apply route.  ASCENSIA CONTOUR strip     polyethylene glycol (MIRALAX) 17 gram/dose powder Take 17 g by mouth as needed. ==================================================================  ASSESSMENT: well appearing male with left-sided headache. History of carotid endarterectomy on the left. Surgical site looks great. No swelling. No pulsations. Tongue and facial movements are intact.  are equal.  No pronator drift. No slurred speech. No falls. Normal neurologic exam        PLAN: manage blood pressure.   Discharge home and follow up with his primary care physician.  _____________________________________________________________________    Condition:  improved  Disposition:  home  Diagnosis:  Left-sided headache, hypertension    Camilla Pfeiffer MD; 6/12/2018 @11:05 AM===========================================    ED Course

## 2018-06-12 NOTE — DISCHARGE INSTRUCTIONS
Headache: Care Instructions  Your Care Instructions    Headaches have many possible causes. Most headaches aren't a sign of a more serious problem, and they will get better on their own. Home treatment may help you feel better faster. The doctor has checked you carefully, but problems can develop later. If you notice any problems or new symptoms, get medical treatment right away. Follow-up care is a key part of your treatment and safety. Be sure to make and go to all appointments, and call your doctor if you are having problems. It's also a good idea to know your test results and keep a list of the medicines you take. How can you care for yourself at home? · Do not drive if you have taken a prescription pain medicine. · Rest in a quiet, dark room until your headache is gone. Close your eyes and try to relax or go to sleep. Don't watch TV or read. · Put a cold, moist cloth or cold pack on the painful area for 10 to 20 minutes at a time. Put a thin cloth between the cold pack and your skin. · Use a warm, moist towel or a heating pad set on low to relax tight shoulder and neck muscles. · Have someone gently massage your neck and shoulders. · Take pain medicines exactly as directed. ¨ If the doctor gave you a prescription medicine for pain, take it as prescribed. ¨ If you are not taking a prescription pain medicine, ask your doctor if you can take an over-the-counter medicine. · Be careful not to take pain medicine more often than the instructions allow, because you may get worse or more frequent headaches when the medicine wears off. · Do not ignore new symptoms that occur with a headache, such as a fever, weakness or numbness, vision changes, or confusion. These may be signs of a more serious problem. To prevent headaches  · Keep a headache diary so you can figure out what triggers your headaches. Avoiding triggers may help you prevent headaches.  Record when each headache began, how long it lasted, and what the pain was like (throbbing, aching, stabbing, or dull). Write down any other symptoms you had with the headache, such as nausea, flashing lights or dark spots, or sensitivity to bright light or loud noise. Note if the headache occurred near your period. List anything that might have triggered the headache, such as certain foods (chocolate, cheese, wine) or odors, smoke, bright light, stress, or lack of sleep. · Find healthy ways to deal with stress. Headaches are most common during or right after stressful times. Take time to relax before and after you do something that has caused a headache in the past.  · Try to keep your muscles relaxed by keeping good posture. Check your jaw, face, neck, and shoulder muscles for tension, and try relaxing them. When sitting at a desk, change positions often, and stretch for 30 seconds each hour. · Get plenty of sleep and exercise. · Eat regularly and well. Long periods without food can trigger a headache. · Treat yourself to a massage. Some people find that regular massages are very helpful in relieving tension. · Limit caffeine by not drinking too much coffee, tea, or soda. But don't quit caffeine suddenly, because that can also give you headaches. · Reduce eyestrain from computers by blinking frequently and looking away from the computer screen every so often. Make sure you have proper eyewear and that your monitor is set up properly, about an arm's length away. · Seek help if you have depression or anxiety. Your headaches may be linked to these conditions. Treatment can both prevent headaches and help with symptoms of anxiety or depression. When should you call for help? Call 911 anytime you think you may need emergency care. For example, call if:  ? · You have signs of a stroke. These may include:  ¨ Sudden numbness, paralysis, or weakness in your face, arm, or leg, especially on only one side of your body. ¨ Sudden vision changes.   ¨ Sudden trouble speaking. ¨ Sudden confusion or trouble understanding simple statements. ¨ Sudden problems with walking or balance. ¨ A sudden, severe headache that is different from past headaches. ?Call your doctor now or seek immediate medical care if:  ? · You have a new or worse headache. ? · Your headache gets much worse. Where can you learn more? Go to http://liam-lori.info/. Enter M271 in the search box to learn more about \"Headache: Care Instructions. \"  Current as of: October 14, 2016  Content Version: 11.4  © 2414-4590 [employer]. Care instructions adapted under license by [employer] (which disclaims liability or warranty for this information). If you have questions about a medical condition or this instruction, always ask your healthcare professional. [place] [address] any warranty or liability for your use of this information.   Recent Results (from the past 8 hour(s))   EKG, 12 LEAD, INITIAL    Collection Time: 06/12/18  9:44 AM   Result Value Ref Range    Ventricular Rate 55 BPM    Atrial Rate 55 BPM    P-R Interval 164 ms    QRS Duration 84 ms    Q-T Interval 424 ms    QTC Calculation (Bezet) 405 ms    Calculated P Axis 76 degrees    Calculated R Axis 44 degrees    Calculated T Axis 14 degrees    Diagnosis       Sinus bradycardia  Nonspecific T wave abnormality  Abnormal ECG  No previous ECGs available     TROPONIN I    Collection Time: 06/12/18  9:56 AM   Result Value Ref Range    Troponin-I, Qt. <0.02 (L) 0.02 - 0.05 NG/ML   CBC WITH AUTOMATED DIFF    Collection Time: 06/12/18  9:56 AM   Result Value Ref Range    WBC 4.5 4.3 - 11.1 K/uL    RBC 4.04 (L) 4.23 - 5.67 M/uL    HGB 12.7 (L) 13.6 - 17.2 g/dL    HCT 35.2 (L) 41.1 - 50.3 %    MCV 87.1 79.6 - 97.8 FL    MCH 31.4 26.1 - 32.9 PG    MCHC 36.1 (H) 31.4 - 35.0 g/dL    RDW 12.8 11.9 - 14.6 %    PLATELET 180 657 - 710 K/uL    MPV 11.0 10.8 - 14.1 FL    DF AUTOMATED      NEUTROPHILS 41 (L) 43 - 78 %    LYMPHOCYTES 43 13 - 44 %    MONOCYTES 7 4.0 - 12.0 %    EOSINOPHILS 8 (H) 0.5 - 7.8 %    BASOPHILS 1 0.0 - 2.0 %    IMMATURE GRANULOCYTES 0 0.0 - 5.0 %    ABS. NEUTROPHILS 1.9 1.7 - 8.2 K/UL    ABS. LYMPHOCYTES 2.0 0.5 - 4.6 K/UL    ABS. MONOCYTES 0.3 0.1 - 1.3 K/UL    ABS. EOSINOPHILS 0.4 0.0 - 0.8 K/UL    ABS. BASOPHILS 0.0 0.0 - 0.2 K/UL    ABS. IMM. GRANS. 0.0 0.0 - 0.5 K/UL   METABOLIC PANEL, COMPREHENSIVE    Collection Time: 06/12/18  9:56 AM   Result Value Ref Range    Sodium 142 136 - 145 mmol/L    Potassium 4.3 3.5 - 5.1 mmol/L    Chloride 106 98 - 107 mmol/L    CO2 28 21 - 32 mmol/L    Anion gap 8 7 - 16 mmol/L    Glucose 131 (H) 65 - 100 mg/dL    BUN 18 8 - 23 MG/DL    Creatinine 1.52 (H) 0.8 - 1.5 MG/DL    GFR est AA 57 (L) >60 ml/min/1.73m2    GFR est non-AA 47 (L) >60 ml/min/1.73m2    Calcium 8.8 8.3 - 10.4 MG/DL    Bilirubin, total 0.6 0.2 - 1.1 MG/DL    ALT (SGPT) 17 12 - 65 U/L    AST (SGOT) 18 15 - 37 U/L    Alk.  phosphatase 94 50 - 136 U/L    Protein, total 7.7 6.3 - 8.2 g/dL    Albumin 3.7 3.2 - 4.6 g/dL    Globulin 4.0 (H) 2.3 - 3.5 g/dL    A-G Ratio 0.9 (L) 1.2 - 3.5

## 2018-06-12 NOTE — ED TRIAGE NOTES
Pt states having a tightness in his head on the left side that goes into the neck and left arm. Pt states the left side of his nose is running. Onset was Sunday. Pt states having balance problems. Denies any injury to the head. Pt also states having on and off left sided chest pain since Sunday.

## 2018-06-12 NOTE — ED NOTES
I have reviewed discharge instructions with the patient. The patient verbalized understanding. Patient left ED via Discharge Method: ambulatory to Home with self    Opportunity for questions and clarification provided. Patient given 2 scripts. Pt given additional rx for BP         To continue your aftercare when you leave the hospital, you may receive an automated call from our care team to check in on how you are doing. This is a free service and part of our promise to provide the best care and service to meet your aftercare needs.  If you have questions, or wish to unsubscribe from this service please call [phone]. Thank you for Choosing our [facility] Emergency Department.

## 2018-09-04 ENCOUNTER — HOSPITAL ENCOUNTER (EMERGENCY)
Age: 83
Discharge: HOME | End: 2018-09-04
Payer: MEDICARE

## 2018-09-04 ENCOUNTER — APPOINTMENT (OUTPATIENT)
Dept: GENERAL RADIOLOGY | Age: 83
End: 2018-09-04
Payer: MEDICARE

## 2018-09-04 VITALS
OXYGEN SATURATION: 98 % | TEMPERATURE: 97.7 F | BODY MASS INDEX: 23.04 KG/M2 | RESPIRATION RATE: 19 BRPM | WEIGHT: 130 LBS | DIASTOLIC BLOOD PRESSURE: 73 MMHG | HEART RATE: 52 BPM | SYSTOLIC BLOOD PRESSURE: 149 MMHG | HEIGHT: 63 IN

## 2018-09-04 DIAGNOSIS — R07.89 ATYPICAL CHEST PAIN: Primary | ICD-10-CM

## 2018-09-04 LAB
ALBUMIN SERPL-MCNC: 3.8 G/DL (ref 3.2–4.6)
ALBUMIN/GLOB SERPL: 0.9 {RATIO} (ref 1.2–3.5)
ALP SERPL-CCNC: 127 U/L (ref 50–136)
ALT SERPL-CCNC: 14 U/L (ref 12–65)
ANION GAP SERPL CALC-SCNC: 6 MMOL/L (ref 7–16)
AST SERPL-CCNC: 15 U/L (ref 15–37)
ATRIAL RATE: 60 BPM
BASOPHILS # BLD: 0 K/UL (ref 0–0.2)
BASOPHILS NFR BLD: 1 % (ref 0–2)
BILIRUB SERPL-MCNC: 0.5 MG/DL (ref 0.2–1.1)
BUN SERPL-MCNC: 32 MG/DL (ref 8–23)
CALCIUM SERPL-MCNC: 9.1 MG/DL (ref 8.3–10.4)
CALCULATED P AXIS, ECG09: 74 DEGREES
CALCULATED R AXIS, ECG10: 28 DEGREES
CALCULATED T AXIS, ECG11: 54 DEGREES
CHLORIDE SERPL-SCNC: 102 MMOL/L (ref 98–107)
CO2 SERPL-SCNC: 28 MMOL/L (ref 21–32)
CREAT SERPL-MCNC: 1.88 MG/DL (ref 0.8–1.5)
DIAGNOSIS, 93000: NORMAL
DIFFERENTIAL METHOD BLD: ABNORMAL
EOSINOPHIL # BLD: 0.4 K/UL (ref 0–0.8)
EOSINOPHIL NFR BLD: 8 % (ref 0.5–7.8)
ERYTHROCYTE [DISTWIDTH] IN BLOOD BY AUTOMATED COUNT: 12.7 %
GLOBULIN SER CALC-MCNC: 4.1 G/DL (ref 2.3–3.5)
GLUCOSE SERPL-MCNC: 324 MG/DL (ref 65–100)
HCT VFR BLD AUTO: 34.5 % (ref 41.1–50.3)
HGB BLD-MCNC: 11.9 G/DL (ref 13.6–17.2)
IMM GRANULOCYTES # BLD: 0 K/UL (ref 0–0.5)
IMM GRANULOCYTES NFR BLD AUTO: 0 % (ref 0–5)
LYMPHOCYTES # BLD: 1.5 K/UL (ref 0.5–4.6)
LYMPHOCYTES NFR BLD: 32 % (ref 13–44)
MCH RBC QN AUTO: 31.4 PG (ref 26.1–32.9)
MCHC RBC AUTO-ENTMCNC: 34.5 G/DL (ref 31.4–35)
MCV RBC AUTO: 91 FL (ref 79.6–97.8)
MONOCYTES # BLD: 0.3 K/UL (ref 0.1–1.3)
MONOCYTES NFR BLD: 7 % (ref 4–12)
NEUTS SEG # BLD: 2.5 K/UL (ref 1.7–8.2)
NEUTS SEG NFR BLD: 52 % (ref 43–78)
NRBC # BLD: 0 K/UL (ref 0–0.2)
P-R INTERVAL, ECG05: 170 MS
PLATELET # BLD AUTO: 183 K/UL (ref 150–450)
PMV BLD AUTO: 10.9 FL (ref 9.4–12.3)
POTASSIUM SERPL-SCNC: 4.5 MMOL/L (ref 3.5–5.1)
PROT SERPL-MCNC: 7.9 G/DL (ref 6.3–8.2)
Q-T INTERVAL, ECG07: 432 MS
QRS DURATION, ECG06: 94 MS
QTC CALCULATION (BEZET), ECG08: 432 MS
RBC # BLD AUTO: 3.79 M/UL (ref 4.23–5.6)
SODIUM SERPL-SCNC: 136 MMOL/L (ref 136–145)
TROPONIN I BLD-MCNC: 0 NG/ML (ref 0.02–0.05)
TROPONIN I BLD-MCNC: 0.01 NG/ML (ref 0.02–0.05)
VENTRICULAR RATE, ECG03: 60 BPM
WBC # BLD AUTO: 4.7 K/UL (ref 4.3–11.1)

## 2018-09-04 PROCEDURE — 84484 ASSAY OF TROPONIN QUANT: CPT

## 2018-09-04 PROCEDURE — 80053 COMPREHEN METABOLIC PANEL: CPT

## 2018-09-04 PROCEDURE — 85025 COMPLETE CBC W/AUTO DIFF WBC: CPT

## 2018-09-04 PROCEDURE — 71046 X-RAY EXAM CHEST 2 VIEWS: CPT

## 2018-09-04 PROCEDURE — 93005 ELECTROCARDIOGRAM TRACING: CPT

## 2018-09-04 PROCEDURE — 99284 EMERGENCY DEPT VISIT MOD MDM: CPT

## 2018-09-04 RX ORDER — TRAMADOL HYDROCHLORIDE 50 MG/1
50 TABLET ORAL
Qty: 12 TAB | Refills: 0 | Status: SHIPPED | OUTPATIENT
Start: 2018-09-04 | End: 2018-10-09

## 2018-09-04 RX ORDER — LANSOPRAZOLE 30 MG/1
30 CAPSULE, DELAYED RELEASE ORAL DAILY
Qty: 20 CAP | Refills: 0 | Status: SHIPPED | OUTPATIENT
Start: 2018-09-04 | End: 2018-09-19

## 2018-09-04 NOTE — ED PROVIDER NOTES
HPI Comments: 53-year-old male complaining of chest pain/discomfort. Symptoms include been going on since Sunday been waxing and waning but not going away. Patient also has some mild shortness of breath. Patient also complains of balance problems however this is been going on for some time and his doctor is aware of. Patient is a [de-identified] y.o. male presenting with chest pain. The history is provided by the patient. Chest Pain (Angina) This is a new problem. The current episode started 2 days ago. The problem occurs constantly. The pain is associated with normal activity. The pain is present in the left side. The pain is at a severity of 5/10. The pain is moderate. The quality of the pain is described as pressure-like. The pain radiates to the left arm. Pertinent negatives include no abdominal pain, no cough and no diaphoresis. He has tried nothing for the symptoms. Past Medical History:  
Diagnosis Date  Arrhythmia   
 since a child, benign per patient  Arthritis  BPPV (benign paroxysmal positional vertigo)  Carotid stenosis Hx of left endarterectomy  Chronic kidney disease   
 long hx of renal insufficiency  Depression   
 much improved at this time  Diabetes ([facility] [address] dx 1995  
 typekelly, avgfbs 100, check glucose 3 times a week, last A1C was 7, does not recognize lows  GERD (gastroesophageal reflux disease)   
 controlled by medication  Hyperlipemia  Hypertension  Osteoarthritis, knee  PUD (peptic ulcer disease) 05/2016  
 treated with protonix  TIA (transient ischemic attack) 2003  
  my weakness on left side  Varicella Past Surgical History:  
Procedure Laterality Date  HX CAROTID ENDARTERECTOMY  2002 Left Sided  HX COLONOSCOPY  05/01/2016  HX HEMORRHOIDECTOMY  2007  HX LUMBAR LAMINECTOMY T5-T6  
 HX OTHER SURGICAL  2005 Sinus [place] THORACIC LAMINECTOMY  2004 1990's Family History: Problem Relation Age of Onset  Diabetes Mother  Heart Disease Mother  Hypertension Father  Hypertension Sister  Heart Disease Sister  Diabetes Brother  Diabetes Brother Social History Social History  Marital status:  Spouse name: N/A  
 Number of children: N/A  
 Years of education: N/A Occupational History  Retired Human Resources Social History Main Topics  Smoking status: Former Smoker Years: 20.00 Types: Pipe Quit date: 8/11/1986  Smokeless tobacco: Never Used Comment: pipe smoker 2 abeba/day  Alcohol use 0.0 oz/week  
  0 Standard drinks or equivalent per week Comment: Annette once per month  Drug use: No  
 Sexual activity: Yes  
  Partners: Female Other Topics Concern  Not on file Social History Narrative Has 2 sons. Used to live in [place], [place] and [place], [place]. ALLERGIES: Zinc 
 
Review of Systems Constitutional: Negative. Negative for activity change and diaphoresis. HENT: Negative. Eyes: Negative. Respiratory: Negative. Negative for cough. Cardiovascular: Positive for chest pain. Gastrointestinal: Negative. Negative for abdominal pain. Genitourinary: Negative. Musculoskeletal: Negative. Skin: Negative. Neurological: Negative. Psychiatric/Behavioral: Negative. All other systems reviewed and are negative. Vitals:  
 09/04/18 1110 BP: 143/67 Pulse: 60 Resp: 18 Temp: 97.7 °F (36.5 °C) SpO2: 99% Weight: 59 kg (130 lb) Height: 5' 3\" (1.6 m) Physical Exam  
Constitutional: He is oriented to person, place, and time. He appears well-developed and well-nourished. No distress. HENT:  
Head: Normocephalic and atraumatic. Right Ear: External ear normal.  
Left Ear: External ear normal.  
Nose: Nose normal.  
Mouth/Throat: Oropharynx is clear and moist. No oropharyngeal exudate. Eyes: Conjunctivae and EOM are normal. Pupils are equal, round, and reactive to light. Right eye exhibits no discharge. Left eye exhibits no discharge. No scleral icterus. Neck: Normal range of motion. Neck supple. No JVD present. No tracheal deviation present. Cardiovascular: Normal rate, regular rhythm and intact distal pulses. Pulmonary/Chest: Effort normal and breath sounds normal. No stridor. No respiratory distress. He has no wheezes. He exhibits no tenderness. Abdominal: Soft. Bowel sounds are normal. He exhibits no distension and no mass. There is no tenderness. Musculoskeletal: Normal range of motion. He exhibits no edema or tenderness. Neurological: He is alert and oriented to person, place, and time. No cranial nerve deficit. Skin: Skin is warm and dry. No rash noted. He is not diaphoretic. No erythema. No pallor. Psychiatric: He has a normal mood and affect. His behavior is normal. Thought content normal.  
Nursing note and vitals reviewed. MDM Number of Diagnoses or Management Options Atypical chest pain:  
Diagnosis management comments: Follow closely with primary care physician Amount and/or Complexity of Data Reviewed Clinical lab tests: ordered and reviewed Tests in the radiology section of CPT®: ordered and reviewed Tests in the medicine section of CPT®: ordered and reviewed ED Course Procedures

## 2018-09-04 NOTE — ED NOTES
I have reviewed discharge instructions with the patient. The patient verbalized understanding. Patient left ED via Discharge Method: wheelchair to Home with family Opportunity for questions and clarification provided. Patient given 2 scripts. To continue your aftercare when you leave the hospital, you may receive an automated call from our care team to check in on how you are doing. This is a free service and part of our promise to provide the best care and service to meet your aftercare needs.  If you have questions, or wish to unsubscribe from this service please call [phone]. Thank you for Choosing our [facility] Emergency Department.

## 2018-09-04 NOTE — ED TRIAGE NOTES
Pt states he has had left sided chest pain that radiates down left arm since Sunday. Pt has upper abd discomfort, denies shob, denies fever/chills, denies n/v/d. 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

## 2018-09-04 NOTE — DISCHARGE INSTRUCTIONS
Chest Pain: Care Instructions  Your Care Instructions    There are many things that can cause chest pain. Some are not serious and will get better on their own in a few days. But some kinds of chest pain need more testing and treatment. Your doctor may have recommended a follow-up visit in the next 8 to 12 hours. If you are not getting better, you may need more tests or treatment. Even though your doctor has released you, you still need to watch for any problems. The doctor carefully checked you, but sometimes problems can develop later. If you have new symptoms or if your symptoms do not get better, get medical care right away. If you have worse or different chest pain or pressure that lasts more than 5 minutes or you passed out (lost consciousness), call 911 or seek other emergency help right away. A medical visit is only one step in your treatment. Even if you feel better, you still need to do what your doctor recommends, such as going to all suggested follow-up appointments and taking medicines exactly as directed. This will help you recover and help prevent future problems. How can you care for yourself at home? · Rest until you feel better. · Take your medicine exactly as prescribed. Call your doctor if you think you are having a problem with your medicine. · Do not drive after taking a prescription pain medicine. When should you call for help? Call 911 if:    · You passed out (lost consciousness).     · You have severe difficulty breathing.     · You have symptoms of a heart attack. These may include:  ¨ Chest pain or pressure, or a strange feeling in your chest.  ¨ Sweating. ¨ Shortness of breath. ¨ Nausea or vomiting. ¨ Pain, pressure, or a strange feeling in your back, neck, jaw, or upper belly or in one or both shoulders or arms. ¨ Lightheadedness or sudden weakness. ¨ A fast or irregular heartbeat.   After you call 911, the  may tell you to chew 1 adult-strength or 2 to 4 low-dose aspirin. Wait for an ambulance. Do not try to drive yourself.    Call your doctor today if:    · You have any trouble breathing.     · Your chest pain gets worse.     · You are dizzy or lightheaded, or you feel like you may faint.     · You are not getting better as expected.     · You are having new or different chest pain. Where can you learn more? Go to http://liam-lori.info/. Enter A120 in the search box to learn more about \"Chest Pain: Care Instructions. \"  Current as of: November 20, 2017  Content Version: 11.7  © 5562-2618 [employer]. Care instructions adapted under license by [employer] (which disclaims liability or warranty for this information). If you have questions about a medical condition or this instruction, always ask your healthcare professional. [address] any warranty or liability for your use of this information.

## 2018-09-11 ENCOUNTER — HOSPITAL ENCOUNTER (OUTPATIENT)
Dept: ULTRASOUND IMAGING | Age: 83
Discharge: HOME | End: 2018-09-11
Attending: INTERNAL MEDICINE
Payer: MEDICARE

## 2018-09-11 DIAGNOSIS — R10.821 RIGHT UPPER QUADRANT ABDOMINAL TENDERNESS WITH REBOUND TENDERNESS: ICD-10-CM

## 2018-09-11 PROCEDURE — 76700 US EXAM ABDOM COMPLETE: CPT

## 2018-09-11 NOTE — PROGRESS NOTES
Abdominal ultrasound report reviewed and was normal, failing to disclose the cause of his pain. If pain persists recommend re-examination.  Please inform the patient

## 2018-10-10 PROBLEM — R06.02 SHORTNESS OF BREATH: Status: ACTIVE | Noted: 2018-10-10

## 2019-03-04 ENCOUNTER — HOSPITAL ENCOUNTER (OUTPATIENT)
Dept: ULTRASOUND IMAGING | Age: 84
Discharge: HOME | End: 2019-03-04
Attending: INTERNAL MEDICINE
Payer: MEDICARE

## 2019-03-04 DIAGNOSIS — I65.21 STENOSIS OF RIGHT CAROTID ARTERY: ICD-10-CM

## 2019-03-04 PROCEDURE — 93880 EXTRACRANIAL BILAT STUDY: CPT

## 2020-05-11 ENCOUNTER — APPOINTMENT (OUTPATIENT)
Dept: GENERAL RADIOLOGY | Age: 85
End: 2020-05-11
Attending: EMERGENCY MEDICINE
Payer: MEDICARE

## 2020-05-11 ENCOUNTER — HOSPITAL ENCOUNTER (EMERGENCY)
Age: 85
Discharge: HOME | End: 2020-05-11
Attending: EMERGENCY MEDICINE
Payer: MEDICARE

## 2020-05-11 VITALS
OXYGEN SATURATION: 98 % | HEART RATE: 63 BPM | SYSTOLIC BLOOD PRESSURE: 198 MMHG | RESPIRATION RATE: 17 BRPM | DIASTOLIC BLOOD PRESSURE: 84 MMHG | BODY MASS INDEX: 24 KG/M2 | HEIGHT: 62 IN | TEMPERATURE: 98.1 F

## 2020-05-11 DIAGNOSIS — I10 HYPERTENSION, UNSPECIFIED TYPE: ICD-10-CM

## 2020-05-11 DIAGNOSIS — R07.9 CHEST PAIN, UNSPECIFIED TYPE: Primary | ICD-10-CM

## 2020-05-11 LAB
ALBUMIN SERPL-MCNC: 3.9 G/DL (ref 3.2–4.6)
ALBUMIN/GLOB SERPL: 0.8 {RATIO} (ref 1.2–3.5)
ALP SERPL-CCNC: 81 U/L (ref 50–136)
ALT SERPL-CCNC: 19 U/L (ref 12–65)
ANION GAP SERPL CALC-SCNC: 6 MMOL/L (ref 7–16)
AST SERPL-CCNC: 20 U/L (ref 15–37)
ATRIAL RATE: 56 BPM
BASOPHILS # BLD: 0 K/UL (ref 0–0.2)
BASOPHILS NFR BLD: 1 % (ref 0–2)
BILIRUB SERPL-MCNC: 0.6 MG/DL (ref 0.2–1.1)
BUN SERPL-MCNC: 28 MG/DL (ref 8–23)
CALCIUM SERPL-MCNC: 9.3 MG/DL (ref 8.3–10.4)
CALCULATED P AXIS, ECG09: 80 DEGREES
CALCULATED R AXIS, ECG10: 35 DEGREES
CALCULATED T AXIS, ECG11: 61 DEGREES
CHLORIDE SERPL-SCNC: 107 MMOL/L (ref 98–107)
CO2 SERPL-SCNC: 27 MMOL/L (ref 21–32)
CREAT SERPL-MCNC: 1.67 MG/DL (ref 0.8–1.5)
DIAGNOSIS, 93000: NORMAL
DIFFERENTIAL METHOD BLD: ABNORMAL
EOSINOPHIL # BLD: 0.4 K/UL (ref 0–0.8)
EOSINOPHIL NFR BLD: 9 % (ref 0.5–7.8)
ERYTHROCYTE [DISTWIDTH] IN BLOOD BY AUTOMATED COUNT: 12.6 % (ref 11.9–14.6)
GLOBULIN SER CALC-MCNC: 4.6 G/DL (ref 2.3–3.5)
GLUCOSE SERPL-MCNC: 114 MG/DL (ref 65–100)
HCT VFR BLD AUTO: 38.4 % (ref 41.1–50.3)
HGB BLD-MCNC: 12.9 G/DL (ref 13.6–17.2)
IMM GRANULOCYTES # BLD AUTO: 0 K/UL (ref 0–0.5)
IMM GRANULOCYTES NFR BLD AUTO: 0 % (ref 0–5)
LYMPHOCYTES # BLD: 1.8 K/UL (ref 0.5–4.6)
LYMPHOCYTES NFR BLD: 39 % (ref 13–44)
MCH RBC QN AUTO: 30.4 PG (ref 26.1–32.9)
MCHC RBC AUTO-ENTMCNC: 33.6 G/DL (ref 31.4–35)
MCV RBC AUTO: 90.4 FL (ref 79.6–97.8)
MONOCYTES # BLD: 0.4 K/UL (ref 0.1–1.3)
MONOCYTES NFR BLD: 8 % (ref 4–12)
NEUTS SEG # BLD: 2.1 K/UL (ref 1.7–8.2)
NEUTS SEG NFR BLD: 44 % (ref 43–78)
NRBC # BLD: 0 K/UL (ref 0–0.2)
P-R INTERVAL, ECG05: 172 MS
PLATELET # BLD AUTO: 178 K/UL (ref 150–450)
PMV BLD AUTO: 10.8 FL (ref 9.4–12.3)
POTASSIUM SERPL-SCNC: 4.4 MMOL/L (ref 3.5–5.1)
PROT SERPL-MCNC: 8.5 G/DL (ref 6.3–8.2)
Q-T INTERVAL, ECG07: 432 MS
QRS DURATION, ECG06: 70 MS
QTC CALCULATION (BEZET), ECG08: 416 MS
RBC # BLD AUTO: 4.25 M/UL (ref 4.23–5.6)
SODIUM SERPL-SCNC: 140 MMOL/L (ref 136–145)
TROPONIN I SERPL-MCNC: <0.02 NG/ML (ref 0.02–0.05)
TROPONIN I SERPL-MCNC: <0.02 NG/ML (ref 0.02–0.05)
VENTRICULAR RATE, ECG03: 56 BPM
WBC # BLD AUTO: 4.8 K/UL (ref 4.3–11.1)

## 2020-05-11 PROCEDURE — 71045 X-RAY EXAM CHEST 1 VIEW: CPT

## 2020-05-11 PROCEDURE — 99284 EMERGENCY DEPT VISIT MOD MDM: CPT

## 2020-05-11 PROCEDURE — 93005 ELECTROCARDIOGRAM TRACING: CPT | Performed by: EMERGENCY MEDICINE

## 2020-05-11 PROCEDURE — 85025 COMPLETE CBC W/AUTO DIFF WBC: CPT

## 2020-05-11 PROCEDURE — 80053 COMPREHEN METABOLIC PANEL: CPT

## 2020-05-11 PROCEDURE — 84484 ASSAY OF TROPONIN QUANT: CPT

## 2020-05-11 NOTE — ED PROVIDER NOTES
Mask was worn during the entire patient examination. Lilly Tyler is a 82 y.o. male who presents to the ED with a chief complaint of chest pain. Patient has some chronic pain to his left shoulder and left flank and left hip. He states he has always been told this is arthritis. He has had some new left-sided chest pain today. He denies any shortness of breath, cough, fever, vomiting, or diarrhea. Patient denies past cardiac history but has had left endarterectomy and TIAs before he also has had benign paroxysmal positional vertigo in the past.  He does report a little bit of dizziness over the last 10 days.            Past Medical History:   Diagnosis Date    Arrhythmia     since a child, benign per patient     Arthritis     BPPV (benign paroxysmal positional vertigo)     Carotid stenosis     Hx of left endarterectomy    Chronic kidney disease     long hx of renal insufficiency    Depression     much improved at this time    Diabetes ([facility] [address] dx 1995    typekelly, avgfbs 100, check glucose 3 times a week, last A1C was 7, does not recognize lows    GERD (gastroesophageal reflux disease)     controlled by medication    Hyperlipemia     Hypertension     Osteoarthritis, knee     PUD (peptic ulcer disease) 05/2016    treated with protonix    TIA (transient ischemic attack) 2003     my weakness on left side    TIA (transient ischemic attack)     2003     Varicella        Past Surgical History:   Procedure Laterality Date    HX CAROTID ENDARTERECTOMY  2002    Left Sided    HX COLONOSCOPY  05/01/2016    HX HEMORRHOIDECTOMY  2007    HX LUMBAR LAMINECTOMY      T5-T6    HX OTHER SURGICAL  2005    Sinus    HX OTHER SURGICAL      elbow surgery    HX THORACIC LAMINECTOMY  2004 1990's         Family History:   Problem Relation Age of Onset    Diabetes Mother     Heart Disease Mother     Hypertension Father     Hypertension Sister     Heart Disease Sister     Diabetes Brother     Diabetes Brother Social History     Socioeconomic History    Marital status:      Spouse name: Not on file    Number of children: Not on file    Years of education: Not on file    Highest education level: Not on file   Occupational History    Occupation: Retired Human Resources   Social Needs    Financial resource strain: Not on file    Food insecurity     Worry: Not on file     Inability: Not on file   [employer] needs     Medical: Not on file     Non-medical: Not on file   Tobacco Use    Smoking status: Former Smoker     Years: 20.00     Types: Pipe     Last attempt to quit: 1986     Years since quittin.7    Smokeless tobacco: Never Used    Tobacco comment: pipe smoker 2 abeba/day   Substance and Sexual Activity    Alcohol use: Yes     Alcohol/week: 0.0 standard drinks     Comment: Red Wine once per month    Drug use: No    Sexual activity: Yes     Partners: Female   Lifestyle    Physical activity     Days per week: Not on file     Minutes per session: Not on file    Stress: Not on file   Relationships    Social connections     Talks on phone: Not on file     Gets together: Not on file     Attends Scientologist service: Not on file     Active member of club or organization: Not on file     Attends meetings of clubs or organizations: Not on file     Relationship status: Not on file    Intimate partner violence     Fear of current or ex partner: Not on file     Emotionally abused: Not on file     Physically abused: Not on file     Forced sexual activity: Not on file   Other Topics Concern    Not on file   Social History Narrative    Has 2 sons. Used to live in [place], [place] and [place], [place].         ALLERGIES: Zinc    Review of Systems   Constitutional: Negative for chills and fever. Respiratory: Negative for cough, chest tightness, shortness of breath, wheezing and stridor. Cardiovascular: Negative for chest pain and palpitations.    Gastrointestinal: Negative for abdominal pain, diarrhea, nausea and vomiting. Skin: Negative for pallor and rash. All other systems reviewed and are negative. Vitals:    05/11/20 1149   BP: (!) 218/83   Pulse: (!) 58   Resp: 18   SpO2: 99%   Height: 5' 2\" (1.575 m)            Physical Exam  Vitals signs and nursing note reviewed. Constitutional:       General: He is not in acute distress. Appearance: He is well-developed. He is not ill-appearing or toxic-appearing. HENT:      Head: Normocephalic and atraumatic. Eyes:      Conjunctiva/sclera: Conjunctivae normal.   Neck:      Musculoskeletal: No neck rigidity. Cardiovascular:      Rate and Rhythm: Normal rate and regular rhythm. Pulmonary:      Effort: Pulmonary effort is normal. No tachypnea, accessory muscle usage or respiratory distress. Breath sounds: No stridor. No decreased breath sounds, wheezing, rhonchi or rales. Skin:     General: Skin is warm and dry. Neurological:      General: No focal deficit present. Mental Status: He is alert. Cranial Nerves: No cranial nerve deficit. Psychiatric:         Mood and Affect: Mood normal.         Behavior: Behavior normal.          MDM  Number of Diagnoses or Management Options  Chest pain, unspecified type:   Hypertension, unspecified type:   Diagnosis management comments: Patient has chest pain that has resolved. He pain in his shoulder and hip which he states is more chronic. He wishes to go home and I did call [facility] to set him up with close follow-up. He has had 2 negative troponins. He is very worried about getting lunch and he has not had taken his bp medications today. Dinorah Abbasi MD; 5/11/2020 @3:20 PM Voice dictation software was used during the making of this note. This software is not perfect and grammatical and other typographical errors may be present.   This note has not been proofread for errors.  ===================================================================          Amount and/or Complexity of Data Reviewed  Clinical lab tests: ordered and reviewed (Results for orders placed or performed during the hospital encounter of 05/11/20  -CBC WITH AUTOMATED DIFF       Result                      Value             Ref Range           WBC                         4.8               4.3 - 11.1 K*       RBC                         4.25              4.23 - 5.6 M*       HGB                         12.9 (L)          13.6 - 17.2 *       HCT                         38.4 (L)          41.1 - 50.3 %       MCV                         90.4              79.6 - 97.8 *       MCH                         30.4              26.1 - 32.9 *       MCHC                        33.6              31.4 - 35.0 *       RDW                         12.6              11.9 - 14.6 %       PLATELET                    178               150 - 450 K/*       MPV                         10.8              9.4 - 12.3 FL       ABSOLUTE NRBC               0.00              0.0 - 0.2 K/*       DF                          AUTOMATED                             NEUTROPHILS                 44                43 - 78 %           LYMPHOCYTES                 39                13 - 44 %           MONOCYTES                   8                 4.0 - 12.0 %        EOSINOPHILS                 9 (H)             0.5 - 7.8 %         BASOPHILS                   1                 0.0 - 2.0 %         IMMATURE GRANULOCYTES       0                 0.0 - 5.0 %         ABS. NEUTROPHILS            2.1               1.7 - 8.2 K/*       ABS. LYMPHOCYTES            1.8               0.5 - 4.6 K/*       ABS. MONOCYTES              0.4               0.1 - 1.3 K/*       ABS. EOSINOPHILS            0.4               0.0 - 0.8 K/*       ABS. BASOPHILS              0.0               0.0 - 0.2 K/*       ABS. IMM.  GRANS.            0.0               0.0 - 0.5 K/*  -METABOLIC PANEL, COMPREHENSIVE       Result                      Value             Ref Range           Sodium 140               136 - 145 mm*       Potassium                   4.4               3.5 - 5.1 mm*       Chloride                    107               98 - 107 mmo*       CO2                         27                21 - 32 mmol*       Anion gap                   6 (L)             7 - 16 mmol/L       Glucose                     114 (H)           65 - 100 mg/*       BUN                         28 (H)            8 - 23 MG/DL        Creatinine                  1.67 (H)          0.8 - 1.5 MG*       GFR est AA                  51 (L)            >60 ml/min/1*       GFR est non-AA              42 (L)            >60 ml/min/1*       Calcium                     9.3               8.3 - 10.4 M*       Bilirubin, total            0.6               0.2 - 1.1 MG*       ALT (SGPT)                  19                12 - 65 U/L         AST (SGOT)                  20                15 - 37 U/L         Alk.  phosphatase            81                50 - 136 U/L        Protein, total              8.5 (H)           6.3 - 8.2 g/*       Albumin                     3.9               3.2 - 4.6 g/*       Globulin                    4.6 (H)           2.3 - 3.5 g/*       A-G Ratio                   0.8 (L)           1.2 - 3.5      -TROPONIN I       Result                      Value             Ref Range           Troponin-I, Qt.             <0.02 (L)         0.02 - 0.05 *  -TROPONIN I       Result                      Value             Ref Range           Troponin-I, Qt.             <0.02 (L)         0.02 - 0.05 *  -EKG, 12 LEAD, INITIAL       Result                      Value             Ref Range           Ventricular Rate            56                BPM                 Atrial Rate                 56                BPM                 P-R Interval                172               ms                  QRS Duration                70                ms                  Q-T Interval                432               ms QTC Calculation (Bezet)     416               ms                  Calculated P Axis           80                degrees             Calculated R Axis           35                degrees             Calculated T Axis           61                degrees             Diagnosis                                                     !! AGE AND GENDER SPECIFIC ECG ANALYSIS !! Sinus bradycardia with marked sinus arrhythmia   Otherwise normal ECG   When compared with ECG of 04-SEP-2018 11:11,   No significant change was found   Confirmed by MINISTERIO VITALE (), Myriam Hooks ([id number]) on 5/11/2020 12:49:56 PM    )  Tests in the radiology section of CPT®: ordered and reviewed (Xr Chest Port    Result Date: 5/11/2020  PORTABLE CHEST, May 11, 2020 at 1207 hours CLINICAL HISTORY:  CHEST pain since 0300 hours today. COMPARISON:  January 16, 2020. FINDINGS:  AP erect image demonstrates no confluent infiltrate or significant pleural fluid. The heart size is within normal limits without evidence of congestive heart failure or pneumothorax. The bony thorax appears intact on this view. There are overlying radiopaque support devices.      IMPRESSION:  NO ACUTE CARDIOPULMONARY DISEASE IDENTIFIED.   )  Independent visualization of images, tracings, or specimens: yes (Normal sinus rhythm, narrow QRS complex, no bundle branch blocks, and no ST segment elevation )           Procedures

## 2020-05-11 NOTE — ED NOTES
I have reviewed discharge instructions with the patient. The patient verbalized understanding. Patient left ED via Discharge Method: ambulatory to Home with self. Opportunity for questions and clarification provided. Patient given 0 scripts. To continue your aftercare when you leave the hospital, you may receive an automated call from our care team to check in on how you are doing.  This is a free service and part of our promise to provide the best care and service to meet your aftercare needs. \" If you have questions, or wish to unsubscribe from this service please call [phone].  Thank you for Choosing our [address] [place] [address] Emergency Department.

## 2020-05-11 NOTE — ED TRIAGE NOTES
Pt ambulatory to Room #6 wearing a mask. Pt c/o chest pain since 0300 this morning. Pt reports he woke-up this morning \"and I was having left shoulder pain that went down my left flank to my hip. I've had that pain for a while due to arthritis. But when I got up, I started having left side chest pain. I took my BP and temperature and got /88 and Temp 97. 4. \" Pt denies having taking any medication for the CP. Pt also reports he hasn't taken his daily meds yet. Pt reports Hx of non-insulin dependent diabetes and kidney problems. Pt also reports having surgery for blockage in left carotid artery 20 years ago. Pt also reports \"some off balance in my walking the past 10 days. \" Pt reports he takes Plavix. Pt denies any SOB, cough, congestion, abdominal pain, n/v/d or sore throat. Dr. Cameron Thomas in room for assessment.

## 2020-05-12 ENCOUNTER — PATIENT OUTREACH (OUTPATIENT)
Dept: CASE MANAGEMENT | Age: 85
End: 2020-05-12

## 2020-05-12 NOTE — PROGRESS NOTES
Patient contacted regarding recent discharge and COVID-19 risk   Care Transition Nurse/ Ambulatory Care Manager contacted the patient by telephone to perform post discharge assessment. Verified name and  with patient as identifiers. Patient has following risk factors of: diabetes, chronic kidney disease and HTN. CTN/ACM reviewed discharge instructions, medical action plan and red flags related to discharge diagnosis. Reviewed and educated them on any new and changed medications related to discharge diagnosis. Advised obtaining a 90-day supply of all daily and as-needed medications. Education provided regarding infection prevention, and signs and symptoms of COVID-19 and when to seek medical attention with patient who verbalized understanding. Discussed exposure protocols and quarantine from [address] you at higher risk for severe illness  and given an opportunity for questions and concerns. The patient agrees to contact the COVID-19 hotline [phone] or PCP office for questions related to their healthcare. CTN/ACM provided contact information for future reference. From CDC: Are you at higher risk for severe illness?  Wash your hands often.  Avoid close contact (6 feet, which is about two arm lengths) with people who are sick.  Put distance between yourself and other people if COVID-19 is spreading in your community.  Clean and disinfect frequently touched surfaces.  Avoid all cruise travel and non-essential air travel.  Call your healthcare professional if you have concerns about COVID-19 and your underlying condition or if you are sick.     For more information on steps you can take to protect yourself, see CDC's How to Protect Yourself      Patient/family/caregiver given information for Paola Figueroa and agrees to enroll no  Patient's preferred e-mail:  NA  Patient's preferred phone number: NA  Based on Loop alert triggers, patient will be contacted by nurse care manager for worsening symptoms. Plan for follow-up call in 7-14 days based on severity of symptoms and risk factors. Pt reports will f/u w/ Cardiology and Ortho.

## 2020-05-25 ENCOUNTER — PATIENT OUTREACH (OUTPATIENT)
Dept: CASE MANAGEMENT | Age: 85
End: 2020-05-25

## 2020-05-25 NOTE — PROGRESS NOTES
Patient resolved from Transition of Care episode on 5/25/20  Discussed COVID-19 related testing which was not done at this time. Test results were not done. Patient informed of results, if available    Patient/family has been provided the following resources and education related to COVID-19:                         Signs, symptoms and red flags related to COVID-19            CDC exposure and quarantine guidelines            [facility] exposure contact - [phone]            Contact for their local Department of Health                 Patient currently reports that the following symptoms have improved:  no new symptoms. No further outreach scheduled with this CTN/ACM/LPN/HC. Episode of Care resolved. Patient has this CTN/ACM/LPN/HC contact information if future needs arise.

## 2020-06-08 ENCOUNTER — HOSPITAL ENCOUNTER (OUTPATIENT)
Dept: PHYSICAL THERAPY | Age: 85
Discharge: HOME | End: 2020-06-08
Payer: MEDICARE

## 2020-06-08 PROCEDURE — 97140 MANUAL THERAPY 1/> REGIONS: CPT

## 2020-06-08 PROCEDURE — 97110 THERAPEUTIC EXERCISES: CPT

## 2020-06-08 PROCEDURE — 97162 PT EVAL MOD COMPLEX 30 MIN: CPT

## 2020-06-08 NOTE — THERAPY EVALUATION
Corby Ortiz  : 1935  [place]  Secondary:  Rachell Marquez @ [place] [address].  [phone]   [phone]       OUTPATIENT PHYSICAL THERAPY:Initial Assessment 2020   ICD-10: Treatment Diagnosis: Pain in left shoulder (M25.512) and Stiffness of left shoulder, not elsewhere classified (M25.612) and Cervicalgia (M54.2) and Stiffness of unspecified joint, not elsewhere classified (M25.60)  Precautions/Allergies:   Zinc     Ambulatory/Rehab Services H2 Model Falls Risk Assessment    Risk Factors:       (1)  Gender [Male] Ability to Rise from Chair:       (0)  Ability to rise in a single movement    Falls Prevention Plan: Mobility Assistance Device (specify):  SPC   Total: (5 or greater = High Risk): 1     [facility] [employer] [facility] [employer] Patent #[id number]. Federal Law prohibits the replication, distribution or use without written permission from [facility]      MEDICAL/REFERRING DIAGNOSIS:  Neck pain [M54.2]  Pain in left shoulder [M25.512]   DATE OF ONSET: chronic shoulder/neck pain with acute exacerbation ~10 days ago  REFERRING PHYSICIAN: GISSELLE Jenkins MD Orders: evaluate and treat  Return MD Appointment: 6/10/20   INITIAL ASSESSMENT:  Mr. Doug De Luna presents with impaired strength, ROM and pain of left shoulder and neck . This limits reaching and lifting tolerance and restricts ability to participate in ADLs. Patient would benefit from skilled physical therapy to address above impairments. PROBLEM LIST (Impacting functional limitations):  1. Decreased Strength  2. Decreased ADL/Functional Activities  3. Increased Pain  4. Decreased Activity Tolerance  5. Decreased Flexibility/Joint Mobility INTERVENTIONS PLANNED: (Treatment may consist of any combination of the following)  1. Cryotherapy  2. Electrical Stimulation  3. Heat  4.  Home Exercise Program (HEP)  5. Manual Therapy  6. Neuromuscular Re-education/Strengthening  7. Therapeutic Activites  8. Therapeutic Exercise/Strengthening   TREATMENT PLAN:  Effective Dates: 6/8/2020 TO 7/8/2020 (30 days). Frequency/Duration: 2 times a week for 30 Day(s)  GOALS: (Goals have been discussed and agreed upon with patient.)  Short-Term Functional Goals: Time Frame: 2 weeks  # Goal Status   1 Pt will confirm compliance with home program to facilitate improvement in function. NEW     Discharge goals: Time frame: 4 weeks   # Goal Status   1 Pt will be able to elevate UE to at least 120° without significant increase in symptoms in order to participate in ADLs such as reaching overhead and pulling shirt on. NEW   2 Pt will be able to reach over head to touch CTJ without significant increase in symptoms to be able to participate in ADLs such as reaching overhead and washing head. NEW   3 Pt will be able to reach across body to touch spine of opposite scapula without significant increase in symptoms to be able to participate in ADLs such as reaching tasks. NEW   4 Pt will be able to reach behind back to touch TLJ without significant increase in symptoms to be able to participate in dressing and toileting activities. NEW   5 Pt will be able to reach behind themselves with arm elevated without symptoms to be able to participate in ADLs such as reaching tasks and grabbing seat belt. NEW       Rehabilitation Potential For Stated Goals: Good  Regarding Fredrick Eckert therapy, I certify that the treatment plan above will be carried out by a therapist or under their direction. Thank you for this referral,  Murtaza Xiong, PT, DPT     Referring Physician Signature: GISSELLE Tracey _______________________________ Date _____________     HISTORY:   History of Injury/Illness (Reason for Referral):  Pt reports he has had shoulder and neck pain for a long time due to arthritis. He has pain in L neck and shoulder down to buttocks on L.  He reports ~10 days ago he had a severe increase in pain and went to ER due to concerns of heart trouble. He reports the tests revealed no cardiac involvement and he was sent to ortho who referred him to PT. He is to have an MRI of his neck on Thursday. He reports he has had increased shoulder and neck pain since incident, above his baseline. He has pain much more consistently and it hurts at night to lay on L which is the side he usually laid on. He would like pain relief, to be able to reach overhead and behind back. Past Medical History/Comorbidities:   Mr. Liya Reina  has a past medical history of Arrhythmia, Arthritis, BPPV (benign paroxysmal positional vertigo), Carotid stenosis, Chronic kidney disease, Depression, Diabetes ([facility] [address] (dx 1995), GERD (gastroesophageal reflux disease), Hyperlipemia, Hypertension, Osteoarthritis, knee, PUD (peptic ulcer disease) (05/2016), TIA (transient ischemic attack) (2003), TIA (transient ischemic attack), and Varicella. He also has no past medical history of Chronic obstructive pulmonary disease ([facility] [address] Mr. Liya Reina  has a past surgical history that includes hx lumbar laminectomy; hx thoracic laminectomy (2004); hx carotid endarterectomy (2002); hx hemorrhoidectomy (2007); hx other surgical (2005); hx colonoscopy (05/01/2016); and hx other surgical.  Social History/Living Environment:     Pt lives alone in two-story home with stairs in home and walk-in shower. Prior Level of Function/Work/Activity:  Pt is retired . Pt had limited prior level of function. Current Medications:       Current Outpatient Medications:     glipiZIDE (GLUCOTROL) 5 mg tablet, Take 2.5 mg by mouth daily. , Disp: , Rfl:     cholecalciferol (Vitamin D3) (1000 Units /25 mcg) tablet, Take  by mouth daily. , Disp: , Rfl:     B.infantis-B.ani-B.long-B.bifi (Probiotic 4X) 10-15 mg TbEC, Take  by mouth., Disp: , Rfl:     carvediloL (COREG) 12.5 mg tablet, Take 1 Tab by mouth two (2) times daily (with meals). , Disp: 180 Tab, Rfl: 1    clopidogreL (PLAVIX) 75 mg tab, Take 1 Tab by mouth daily. , Disp: 90 Tab, Rfl: 1    desonide (TRIDESILON) 0.05 % cream, Apply  to affected area two (2) times a day., Disp: 45 g, Rfl: 0    losartan (COZAAR) 100 mg tablet, Take 1 Tab by mouth daily. , Disp: 90 Tab, Rfl: 1    pantoprazole (PROTONIX) 40 mg tablet, Take 1 Tab by mouth daily. , Disp: 90 Tab, Rfl: 1    simvastatin (ZOCOR) 20 mg tablet, Take 1 Tab by mouth nightly., Disp: 90 Tab, Rfl: 1    SITagliptin (JANUVIA) 100 mg tablet, Take 1 Tab by mouth daily. , Disp: 30 Tab, Rfl: 3    traZODone (DESYREL) 50 mg tablet, TAKE 1-2 TABLETS BY MOUTH EVERY NIGHT AT BEDTIME AS NEEDED FOR SLEEP, Disp: 180 Tab, Rfl: 1    glucose blood VI test strips (FREESTYLE LITE STRIPS) strip, Check once daily, Disp: 100 Strip, Rfl: 1    carboxymethylcellulose sodium (REFRESH LIQUIGEL) 1 % dlgl, Apply  to eye two (2) times a day., Disp: , Rfl:     BLOOD-GLUCOSE METER (FREESTYLE LITE METER), by Does Not Apply route., Disp: , Rfl:     LANCETS, by Does Not Apply route., Disp: , Rfl:    Date Last Reviewed:  6/8/2020    Number of Personal Factors/Comorbidities that affect the Plan of Care: 1-2: MODERATE COMPLEXITY   EXAMINATION:   Pain at worst: 8/10  ROM:    Left Right   Elevation (°) 114 with pain 123   Behind neck reach To CTJ with pain To CTJ   Behind back reach To TLJ with pain that radiated down towards elbow To TLJ   Cross body reach To opposite scapula with mild pain To opposite scapula       UE ANDT: All within normal limits B  Strength: Pt able to actively lift L UE and perform S/L ER with mild increase in pain. He could perform S/L abduction without increase in pain. Palpation: TTP throughout entire L neck and shoulder. Body Structures Involved:  1. Nerves  2. Joints  3. Muscles Body Functions Affected:  1. Sensory/Pain  2. Neuromusculoskeletal  3. Movement Related Activities and Participation Affected:  1. General Tasks and Demands  2.  Self Care  3. Community, Social and [place]   Number of elements (examined above) that affect the Plan of Care: 3: MODERATE COMPLEXITY   CLINICAL PRESENTATION:   Presentation: Evolving clinical presentation with changing clinical characteristics: MODERATE COMPLEXITY     CLINICAL DECISION MAKING:      OUTCOME MEASURE:   Initial outcome measure performed on 6/8/2020. Tool Used: Disabilities of the Arm, Shoulder and Hand (DASH) Questionnaire - Quick Version  Score:  Initial: 41/55  Most Recent: X/55 (Date: -- )   Interpretation of Score: The DASH is designed to measure the activities of daily living in person's with upper extremity dysfunction or pain. Each section is scored on a 1-5 scale, 5 representing the greatest disability. The scores of each section are added together for a total score of 55. MEDICAL NECESSITY:   · Patient will benefit from skilled physical therapy to address their previously listed impairments in order to improve their independence with functional activities painfree. REASON FOR SERVICES/OTHER COMMENTS:  · Patient requires present interventions due to patient's inability to perform functional and recreational activities painfree.    Use of outcome tool(s) and clinical judgement create a POC that gives a: Questionable prediction of patient's progress: MODERATE COMPLEXITY        Total Duration: 40 min  PT Patient Time In/Time Out  Time In: 7318  Time Out: Professor Hopson 108, PT, DPT

## 2020-06-08 NOTE — PROGRESS NOTES
Wallis Litten  : 1935  [place]  Secondary:  [address] @ [address], [address] [place] [address]  [phone]   [phone]      OUTPATIENT PHYSICAL THERAPY: Daily Treatment Note 2020  Visit Count:  1    ICD-10: Treatment Diagnosis: Pain in left shoulder (M25.512) and Stiffness of left shoulder, not elsewhere classified (M25.612) and Cervicalgia (M54.2) and Stiffness of unspecified joint, not elsewhere classified (M25.60)  TREATMENT PLAN:  Effective Dates: 2020 TO 2020 (30 days). Frequency/Duration: 2 times a week for 30 Day(s)  GOALS: (Goals have been discussed and agreed upon with patient.)  Short-Term Functional Goals: Time Frame: 2 weeks  # Goal Status   1 Pt will confirm compliance with home program to facilitate improvement in function. NEW     Discharge goals: Time frame: 4 weeks   # Goal Status   1 Pt will be able to elevate UE to at least 120° without significant increase in symptoms in order to participate in ADLs such as reaching overhead and pulling shirt on. NEW   2 Pt will be able to reach over head to touch CTJ without significant increase in symptoms to be able to participate in ADLs such as reaching overhead and washing head. NEW   3 Pt will be able to reach across body to touch spine of opposite scapula without significant increase in symptoms to be able to participate in ADLs such as reaching tasks. NEW   4 Pt will be able to reach behind back to touch TLJ without significant increase in symptoms to be able to participate in dressing and toileting activities. NEW   5 Pt will be able to reach behind themselves with arm elevated without symptoms to be able to participate in ADLs such as reaching tasks and grabbing seat belt. NEW       Pre-treatment Symptoms/Complaints:  Pt reports near constant pain with only relief being hot back.   Pain: Initial:   7/10 Post Session:  6/10   Medications Last Reviewed: 6/8/2020  Updated Objective Findings: Below measures from initial evaluation unless otherwise noted. 6/8/20  Pain at worst: 8/10  ROM:    Left Right   Elevation (°) 114 with pain 123   Behind neck reach To CTJ with pain To CTJ   Behind back reach To TLJ with pain that radiated down towards elbow To TLJ   Cross body reach To opposite scapula with mild pain To opposite scapula       UE ANDT: All within normal limits B  Strength: Pt able to actively lift L UE and perform S/L ER with mild increase in pain. He could perform S/L abduction without increase in pain. Palpation: TTP throughout entire L neck and shoulder. DASH: 41/55    TREATMENT:   THERAPEUTIC ACTIVITY: (see chart for minutes): Therapeutic activities per grid below to improve mobility, strength, balance and coordination. Required minimal visual, verbal and tactile cues to improve independence with functional mobility and activities. THERAPEUTIC EXERCISE: (see chart for minutes):  Exercises per grid below to improve mobility, strength, balance and coordination. Required minimal visual, verbal and tactile cues to promote proper body alignment and promote proper body mechanics. Progressed resistance, range, repetitions and complexity of movement as indicated. NEUROMUSCULAR RE-EDUCATION: (see chart for minutes):  Exercise/activities per grid below to improve balance, coordination, kinesthetic sense, posture, pain, and proprioception. Required minimal visual, verbal and tactile cues to promote motor control of right, upper extremity(s). MANUAL THERAPY: (see chart for minutes): Joint mobilization, Soft tissue mobilization, skin mobilization, and muscle energy techniques were utilized and necessary because of the patient's restricted joint motion, restricted motion of soft tissue and pain . MODALITIES: (see chart for minutes): *  Hot Pack Therapy in order to provide analgesia and relieve muscle spasm.      Date: 6/8/20 (visit 1)       Modalities: Therapeutic Exercise: 15 min        S/L ER: x5 R  S/L abduction: x5 R  Prone elbow lift: x5 B, x5 B with head lift. Pt educated to perform with emphasis on relaxation after lift. Prone LE lift: x5 B   Pt educated on home program implementation and given printout. Neuromuscular re-education                Manual Therapy: 10 min        STM to R neck, anterior and posterior shoulder. Pt hypersensitive to pressure but reported the manual therapy helped. Therapeutic Activities:                  Home program: 6/8/20: prone elbow lift contract-relax    [facility] Portal  Treatment/Session Summary:    · Response to Treatment: Pt reported the treatment helped and he felt better than when he came in. · Communication/Consultation:  None today  · Equipment provided today:  None today  · Recommendations/Intent for next treatment session: Next visit will focus on improving pain-free L shoulder and neck AROM and decreasing muscular tension.     Total Treatment Billable Duration:  40 minutes  PT Patient Time In/Time Out  Time In: 5529  Time Out: [address], PT, DPT    Future Appointments   Date Time Provider Jacob Camacho   6[facility]10/2020  8:45 [facility]   6/12/2020  8:45 AM Sophie Kelly [facility]   6/12/2020 10:[facility] [place] [facility]   6/15/2020  8:45 AM Sophie Kelly [facility]   6/19/2020  8:00 AM [place] [facility]   6/29/2020  8:45 AM Bimal Waggoner MD [facility]   11/16/2020  9:30 AM Dorn Spatz, MD [facility]

## 2020-06-10 ENCOUNTER — HOSPITAL ENCOUNTER (OUTPATIENT)
Dept: PHYSICAL THERAPY | Age: 85
Discharge: HOME | End: 2020-06-10
Payer: MEDICARE

## 2020-06-10 PROCEDURE — 97110 THERAPEUTIC EXERCISES: CPT

## 2020-06-10 NOTE — PROGRESS NOTES
Fabi Post  : 1935  [place]  Secondary:  [address] @ [address], [address] [place] [address]  [phone]   [phone]      OUTPATIENT PHYSICAL THERAPY: Daily Treatment Note 6/10/2020  Visit Count:  2    ICD-10: Treatment Diagnosis: Pain in left shoulder (M25.512) and Stiffness of left shoulder, not elsewhere classified (M25.612) and Cervicalgia (M54.2) and Stiffness of unspecified joint, not elsewhere classified (M25.60)  TREATMENT PLAN:  Effective Dates: 2020 TO 2020 (30 days). Frequency/Duration: 2 times a week for 30 Day(s)  GOALS: (Goals have been discussed and agreed upon with patient.)  Short-Term Functional Goals: Time Frame: 2 weeks  # Goal Status   1 Pt will confirm compliance with home program to facilitate improvement in function. NEW     Discharge goals: Time frame: 4 weeks   # Goal Status   1 Pt will be able to elevate UE to at least 120° without significant increase in symptoms in order to participate in ADLs such as reaching overhead and pulling shirt on. NEW   2 Pt will be able to reach over head to touch CTJ without significant increase in symptoms to be able to participate in ADLs such as reaching overhead and washing head. NEW   3 Pt will be able to reach across body to touch spine of opposite scapula without significant increase in symptoms to be able to participate in ADLs such as reaching tasks. NEW   4 Pt will be able to reach behind back to touch TLJ without significant increase in symptoms to be able to participate in dressing and toileting activities. NEW   5 Pt will be able to reach behind themselves with arm elevated without symptoms to be able to participate in ADLs such as reaching tasks and grabbing seat belt. NEW       Pre-treatment Symptoms/Complaints:  Pt reports he has been doing exercise in the morning and evening and it has helped significantly.  He reports it gives him relief and he has been able to sleep on his L side. He reports his L shoulder is still not symmetrical with R.   Pain: Initial:   3-4/10 Post Session:  5/10   Medications Last Reviewed: 6/10/2020  Updated Objective Findings: Below measures from initial evaluation unless otherwise noted. 6/8/20  Pain at worst: 8/10  ROM:    Left Right   Elevation (°) 114 with pain 123   Behind neck reach To CTJ with pain To CTJ   Behind back reach To TLJ with pain that radiated down towards elbow To TLJ   Cross body reach To opposite scapula with mild pain To opposite scapula       UE ANDT: All within normal limits B  Strength: Pt able to actively lift L UE and perform S/L ER with mild increase in pain. He could perform S/L abduction without increase in pain. Palpation: TTP throughout entire L neck and shoulder. DASH: 41/55    TREATMENT:   THERAPEUTIC ACTIVITY: (see chart for minutes): Therapeutic activities per grid below to improve mobility, strength, balance and coordination. Required minimal visual, verbal and tactile cues to improve independence with functional mobility and activities. THERAPEUTIC EXERCISE: (see chart for minutes):  Exercises per grid below to improve mobility, strength, balance and coordination. Required minimal visual, verbal and tactile cues to promote proper body alignment and promote proper body mechanics. Progressed resistance, range, repetitions and complexity of movement as indicated. NEUROMUSCULAR RE-EDUCATION: (see chart for minutes):  Exercise/activities per grid below to improve balance, coordination, kinesthetic sense, posture, pain, and proprioception. Required minimal visual, verbal and tactile cues to promote motor control of right, upper extremity(s).   MANUAL THERAPY: (see chart for minutes): Joint mobilization, Soft tissue mobilization, skin mobilization, and muscle energy techniques were utilized and necessary because of the patient's restricted joint motion, restricted motion of soft tissue and pain .   MODALITIES: (see chart for minutes): *  Hot Pack Therapy in order to provide analgesia and relieve muscle spasm. Date: 6/8/20 (visit 1) 6/10/20 (visit 2)       Modalities:                Therapeutic Exercise: 15 min 40 min       S/L ER: x5 R  S/L abduction: x5 R  Prone elbow lift: x5 B, x5 B with head lift. Pt educated to perform with emphasis on relaxation after lift. Prone LE lift: x5 B   Pt educated on home program implementation and given printout. Prone elbow lift: x5 B, 2x5 B with head lift with focus on relaxing UT. Scapular AROM: 3x3 in all 4 directions. Band ER: 3x10 red. Pt cued to focus on relaxing UT and rotating L shoulder  Band pull-apart: 3x10 red with cues for relaxing UT  Band press: x10 B       Neuromuscular re-education                Manual Therapy: 10 min        STM to R neck, anterior and posterior shoulder. Pt hypersensitive to pressure but reported the manual therapy helped. Therapeutic Activities:                  Home program: 6/8/20: prone elbow lift contract-relax    [employer] Portal  Treatment/Session Summary:    · Response to Treatment: Pt responded well to physical therapy so far. He was given red band and educated on new exercises with emphasis on relaxing UT during exercises. · Communication/Consultation:  None today  · Equipment provided today:  6/10/20: red band   · Recommendations/Intent for next treatment session: Next visit will focus on improving pain-free L shoulder and neck AROM and decreasing muscular tension.     Total Treatment Billable Duration:  40 minutes  PT Patient Time In/Time Out  Time In: 0845  Time Out: [address] Kiersten Monzon PT, DPT    Future Appointments   Date Time Provider Jacob Camacho   6/12/2020  8:45 AM Joann Cervantes [facility]   6/15/2020  8:45 AM Joann MORGAN [facility]   6/19/2020  8:00 AM Joann Cervantes [facility]   6/29/2020  8:45 AM Shannan Kruse MD [facility]   11/16/2020  9:30 AM Harsh Santillan MD [facility] [address]

## 2020-06-12 ENCOUNTER — APPOINTMENT (OUTPATIENT)
Dept: PHYSICAL THERAPY | Age: 85
End: 2020-06-12
Payer: MEDICARE

## 2020-06-12 ENCOUNTER — HOSPITAL ENCOUNTER (OUTPATIENT)
Dept: PHYSICAL THERAPY | Age: 85
Discharge: HOME | End: 2020-06-12
Payer: MEDICARE

## 2020-06-12 PROCEDURE — 97110 THERAPEUTIC EXERCISES: CPT

## 2020-06-12 PROCEDURE — 97112 NEUROMUSCULAR REEDUCATION: CPT

## 2020-06-12 NOTE — PROGRESS NOTES
Rbeecca Christensen  : 1935  [place]  Secondary:  [id number] @ [address]  [address] [address].  [phone]   [phone]      OUTPATIENT PHYSICAL THERAPY: Daily Treatment Note 2020  Visit Count:  3    ICD-10: Treatment Diagnosis: Pain in left shoulder (M25.512) and Stiffness of left shoulder, not elsewhere classified (M25.612) and Cervicalgia (M54.2) and Stiffness of unspecified joint, not elsewhere classified (M25.60)  TREATMENT PLAN:  Effective Dates: 2020 TO 2020 (30 days). Frequency/Duration: 2 times a week for 30 Day(s)  GOALS: (Goals have been discussed and agreed upon with patient.)  Short-Term Functional Goals: Time Frame: 2 weeks  # Goal Status   1 Pt will confirm compliance with home program to facilitate improvement in function. NEW     Discharge goals: Time frame: 4 weeks   # Goal Status   1 Pt will be able to elevate UE to at least 120° without significant increase in symptoms in order to participate in ADLs such as reaching overhead and pulling shirt on. NEW   2 Pt will be able to reach over head to touch CTJ without significant increase in symptoms to be able to participate in ADLs such as reaching overhead and washing head. NEW   3 Pt will be able to reach across body to touch spine of opposite scapula without significant increase in symptoms to be able to participate in ADLs such as reaching tasks. NEW   4 Pt will be able to reach behind back to touch TLJ without significant increase in symptoms to be able to participate in dressing and toileting activities. NEW   5 Pt will be able to reach behind themselves with arm elevated without symptoms to be able to participate in ADLs such as reaching tasks and grabbing seat belt. NEW       Pre-treatment Symptoms/Complaints:  Pt reports he has been doing band exercises as well as some other exercises he came up with using the band.  He reports his shoulder is feeling better and he can sleep on it better now. He reports it still is not symmetrical to R shoulder though. Pain: Initial:   4/10 Post Session:  Pt reported no pain afterwards. Medications Last Reviewed: 6/12/2020  Updated Objective Findings: Below measures from initial evaluation unless otherwise noted. 6/8/20  Pain at worst: 8/10  ROM:    Left Right   Elevation (°) 114 with pain 123   Behind neck reach To CTJ with pain To CTJ   Behind back reach To TLJ with pain that radiated down towards elbow To TLJ   Cross body reach To opposite scapula with mild pain To opposite scapula       UE ANDT: All within normal limits B  Strength: Pt able to actively lift L UE and perform S/L ER with mild increase in pain. He could perform S/L abduction without increase in pain. Palpation: TTP throughout entire L neck and shoulder. DASH: 41/55    TREATMENT:   THERAPEUTIC ACTIVITY: (see chart for minutes): Therapeutic activities per grid below to improve mobility, strength, balance and coordination. Required minimal visual, verbal and tactile cues to improve independence with functional mobility and activities. THERAPEUTIC EXERCISE: (see chart for minutes):  Exercises per grid below to improve mobility, strength, balance and coordination. Required minimal visual, verbal and tactile cues to promote proper body alignment and promote proper body mechanics. Progressed resistance, range, repetitions and complexity of movement as indicated. NEUROMUSCULAR RE-EDUCATION: (see chart for minutes):  Exercise/activities per grid below to improve balance, coordination, kinesthetic sense, posture, pain, and proprioception. Required minimal visual, verbal and tactile cues to promote motor control of right, upper extremity(s).   MANUAL THERAPY: (see chart for minutes): Joint mobilization, Soft tissue mobilization, skin mobilization, and muscle energy techniques were utilized and necessary because of the patient's restricted joint motion, restricted motion of soft tissue and pain . MODALITIES: (see chart for minutes): *  Hot Pack Therapy in order to provide analgesia and relieve muscle spasm. Date: 6/8/20 (visit 1) 6/10/20 (visit 2)  6/12/20 (visit 3)      Modalities:                Therapeutic Exercise: 15 min 40 min 30 min      S/L ER: x5 R  S/L abduction: x5 R  Prone elbow lift: x5 B, x5 B with head lift. Pt educated to perform with emphasis on relaxation after lift. Prone LE lift: x5 B   Pt educated on home program implementation and given printout. Prone elbow lift: x5 B, 2x5 B with head lift with focus on relaxing UT. Scapular AROM: 3x3 in all 4 directions. Band ER: 3x10 red. Pt cued to focus on relaxing UT and rotating L shoulder  Band pull-apart: 3x10 red with cues for relaxing UT  Band press: x10 B  Prone elbow lift: x10 B, x10 B   Band ER: 2x10 red. Band pull-apart: 2x10 red with cues for relaxing UT  Band press: 2x10 B   Band ER in abduction: 2x10 B with second set improved on L     Neuromuscular re-education   10 min        Progressive relaxation utilized to improve muscular tension and pain in L shoulder. Pt reported afterwards that it felt good and he had no pain afterwards. Manual Therapy: 10 min        STM to R neck, anterior and posterior shoulder. Pt hypersensitive to pressure but reported the manual therapy helped. Therapeutic Activities:                  Home program: 6/8/20: prone elbow lift contract-relax    [facility] Portal  Treatment/Session Summary:    · Response to Treatment: Pt continuing to progress well and demonstrates improved AROM and resisted movement with bands. He was educated on relaxation routine to use at home and he reported he would use it.    · Communication/Consultation:  None today  · Equipment provided today:  6/10/20: red band   · Recommendations/Intent for next treatment session: Next visit will focus on improving pain-free L shoulder and neck AROM and decreasing muscular tension.     Total Treatment Billable Duration:  40 minutes  PT Patient Time In/Time Out  Time In: 0845  Time Out: [address], PT, DPT    Future Appointments   Date Time Provider Jacob Camacho   6/15/2020  8:45 AM Rafi Bernard [facility]   6/19/2020  8:00 AM Rafi Bernard [facility]   6/29/2020  8:45 AM Shanita Moreno MD [facility]   11/16/2020  9:30 AM Eileen Akers MD [address]

## 2020-06-15 ENCOUNTER — HOSPITAL ENCOUNTER (OUTPATIENT)
Dept: PHYSICAL THERAPY | Age: 85
Discharge: HOME | End: 2020-06-15
Payer: MEDICARE

## 2020-06-15 PROCEDURE — 97110 THERAPEUTIC EXERCISES: CPT

## 2020-06-15 PROCEDURE — 97112 NEUROMUSCULAR REEDUCATION: CPT

## 2020-06-15 NOTE — PROGRESS NOTES
Sharon Akhtar  : 1935  [place]  Secondary:  [address] @ [address] [place] [address], [address] [place] [address]  [phone]   [phone]      OUTPATIENT PHYSICAL THERAPY: Daily Treatment Note 6/15/2020  Visit Count:  4    ICD-10: Treatment Diagnosis: Pain in left shoulder (M25.512) and Stiffness of left shoulder, not elsewhere classified (M25.612) and Cervicalgia (M54.2) and Stiffness of unspecified joint, not elsewhere classified (M25.60)  TREATMENT PLAN:  Effective Dates: 2020 TO 2020 (30 days). Frequency/Duration: 2 times a week for 30 Day(s)  GOALS: (Goals have been discussed and agreed upon with patient.)  Short-Term Functional Goals: Time Frame: 2 weeks  # Goal Status   1 Pt will confirm compliance with home program to facilitate improvement in function. MET     Discharge goals: Time frame: 4 weeks   # Goal Status   1 Pt will be able to elevate UE to at least 120° without significant increase in symptoms in order to participate in ADLs such as reaching overhead and pulling shirt on. NEW   2 Pt will be able to reach over head to touch CTJ without significant increase in symptoms to be able to participate in ADLs such as reaching overhead and washing head. MET   3 Pt will be able to reach across body to touch spine of opposite scapula without significant increase in symptoms to be able to participate in ADLs such as reaching tasks. MET   4 Pt will be able to reach behind back to touch TLJ without significant increase in symptoms to be able to participate in dressing and toileting activities. MET   5 Pt will be able to reach behind themselves with arm elevated without symptoms to be able to participate in ADLs such as reaching tasks and grabbing seat belt. MET       Pre-treatment Symptoms/Complaints:  Pt reports his shoulder has been feeling much better compared to prior to PT.  He reports he is sleeping better and using arm with less pain. He confirms compliance with home program and that his red band broke. He reports still a little bit of tension in L UT as well as weakness in L overhead motions. Pain: Initial:   3/10 Post Session:  Pt reported no pain, just tiredness in L shoulder muscles   Medications Last Reviewed: 6/15/2020  Updated Objective Findings: Below measures from initial evaluation unless otherwise noted. 6/8/20  Pain at worst: 8/10  ROM:    Left Right   Elevation (°) 114 with pain 123   Behind neck reach To CTJ with pain To CTJ   Behind back reach To TLJ with pain that radiated down towards elbow To TLJ   Cross body reach To opposite scapula with mild pain To opposite scapula       UE ANDT: All within normal limits B  Strength: Pt able to actively lift L UE and perform S/L ER with mild increase in pain. He could perform S/L abduction without increase in pain. Palpation: TTP throughout entire L neck and shoulder. DASH: 41/55    TREATMENT:   THERAPEUTIC ACTIVITY: (see chart for minutes): Therapeutic activities per grid below to improve mobility, strength, balance and coordination. Required minimal visual, verbal and tactile cues to improve independence with functional mobility and activities. THERAPEUTIC EXERCISE: (see chart for minutes):  Exercises per grid below to improve mobility, strength, balance and coordination. Required minimal visual, verbal and tactile cues to promote proper body alignment and promote proper body mechanics. Progressed resistance, range, repetitions and complexity of movement as indicated. NEUROMUSCULAR RE-EDUCATION: (see chart for minutes):  Exercise/activities per grid below to improve balance, coordination, kinesthetic sense, posture, pain, and proprioception. Required minimal visual, verbal and tactile cues to promote motor control of right, upper extremity(s).   MANUAL THERAPY: (see chart for minutes): Joint mobilization, Soft tissue mobilization, skin mobilization, and muscle energy techniques were utilized and necessary because of the patient's restricted joint motion, restricted motion of soft tissue and pain . MODALITIES: (see chart for minutes): *  Hot Pack Therapy in order to provide analgesia and relieve muscle spasm. Date: 6/8/20 (visit 1) 6/10/20 (visit 2)  6/12/20 (visit 3)  6/15/20 (visit 4)     Modalities:                Therapeutic Exercise: 15 min 40 min 30 min 30 min     S/L ER: x5 R  S/L abduction: x5 R  Prone elbow lift: x5 B, x5 B with head lift. Pt educated to perform with emphasis on relaxation after lift. Prone LE lift: x5 B   Pt educated on home program implementation and given printout. Prone elbow lift: x5 B, 2x5 B with head lift with focus on relaxing UT. Scapular AROM: 3x3 in all 4 directions. Band ER: 3x10 red. Pt cued to focus on relaxing UT and rotating L shoulder  Band pull-apart: 3x10 red with cues for relaxing UT  Band press: x10 B  Prone elbow lift: x10 B, x10 B   Band ER: 2x10 red. Band pull-apart: 2x10 red with cues for relaxing UT  Band press: 2x10 B   Band ER in abduction: 2x10 B with second set improved on L Prone elbow lift: 2x10 B   Band ER: 2x10 red. Band pull-apart: 2x10 red   Band press: 2x10 B red  Band ER in abduction: 2x10 B red  Band D2 flexion: 2x10 B red  Pt given updated home program to continue working on. Neuromuscular re-education   10 min 8 min       Progressive relaxation utilized to improve muscular tension and pain in L shoulder. Pt reported afterwards that it felt good and he had no pain afterwards. Progressive relaxation routine with verbal cues first time. Pt practiced x2 independently and reported it went well. He was reported to use breathing and word \"relax\" when practicing on his own. Manual Therapy: 10 min        STM to R neck, anterior and posterior shoulder. Pt hypersensitive to pressure but reported the manual therapy helped.         Therapeutic Activities:                  Home program: 6/8/20: prone elbow lift contract-relax  6/15/20: elbow lift, band ER, band pull-apart, band press, band ER in abduction, band D2 flexion    [facility] Portal  Treatment/Session Summary:    · Response to Treatment: Pt demonstrates significantly improved L shoulder pain, ROM, strength, and function. He reports he still notices L shoulder is not as strong as R and was challenged with L overhead exercises today. · Communication/Consultation:  None today  · Equipment provided today:  6/10/20: red band. 6/15/20: pt given another red band   · Recommendations/Intent for next treatment session: Next visit will focus on improving pain-free L shoulder ROM and decreasing muscular tension.     Total Treatment Billable Duration:  38 minutes  PT Patient Time In/Time Out  Time In: 0933  Time Out: Shanice [address], PT, DPT    Future Appointments   Date Time Provider Jacob Camacho   6/19/2020  8:00 AM Jayesh Ol[facility]   6/29/2020  8:45 AM Quincy Schafer MD [facility]   11/16/2020  9:30 AM Anjali Marcial MD [address]

## 2020-06-19 ENCOUNTER — HOSPITAL ENCOUNTER (OUTPATIENT)
Dept: PHYSICAL THERAPY | Age: 85
Discharge: HOME | End: 2020-06-19
Payer: MEDICARE

## 2020-06-19 PROCEDURE — 97110 THERAPEUTIC EXERCISES: CPT

## 2020-06-19 PROCEDURE — 97140 MANUAL THERAPY 1/> REGIONS: CPT

## 2020-06-22 ENCOUNTER — HOSPITAL ENCOUNTER (OUTPATIENT)
Dept: PHYSICAL THERAPY | Age: 85
Discharge: HOME | End: 2020-06-22
Payer: MEDICARE

## 2020-06-22 PROCEDURE — 97110 THERAPEUTIC EXERCISES: CPT

## 2020-06-22 NOTE — PROGRESS NOTES
Cassy Nuñez  : 1935  [place]  Secondary:  [address] @ [address], [address] [place] [address]  [phone]   [phone]      OUTPATIENT PHYSICAL THERAPY: Daily Treatment Note 2020  Visit Count:  6    ICD-10: Treatment Diagnosis: Pain in left shoulder (M25.512) and Stiffness of left shoulder, not elsewhere classified (M25.612) and Cervicalgia (M54.2) and Stiffness of unspecified joint, not elsewhere classified (M25.60)  TREATMENT PLAN:  Effective Dates: 2020 TO 2020 (30 days). Frequency/Duration: 2 times a week for 30 Day(s)  GOALS: (Goals have been discussed and agreed upon with patient.)  Short-Term Functional Goals: Time Frame: 2 weeks  # Goal Status   1 Pt will confirm compliance with home program to facilitate improvement in function. MET     Discharge goals: Time frame: 4 weeks   # Goal Status   1 Pt will be able to elevate UE to at least 120° without significant increase in symptoms in order to participate in ADLs such as reaching overhead and pulling shirt on.  20: L UE elevation to 123 °  MET   2 Pt will be able to reach over head to touch CTJ without significant increase in symptoms to be able to participate in ADLs such as reaching overhead and washing head. MET   3 Pt will be able to reach across body to touch spine of opposite scapula without significant increase in symptoms to be able to participate in ADLs such as reaching tasks. MET   4 Pt will be able to reach behind back to touch TLJ without significant increase in symptoms to be able to participate in dressing and toileting activities. MET   5 Pt will be able to reach behind themselves with arm elevated without symptoms to be able to participate in ADLs such as reaching tasks and grabbing seat belt. MET   6 Pt will be able to perform all cervical AROM WNL and pain-free to be able to perform ADLs.  ADDED 20        Pre-treatment Symptoms/Complaints: Pt reports his shoulder has been doing well, he has been doing all of his exercises and it is getting stronger. He reports there is still some soreness in neck with end range rotation but he has also been doing his exercises for the neck. Pain: Initial:   No pain Post Session:  No pain   Medications Last Reviewed: 6/22/2020  Updated Objective Findings: Below measures from initial evaluation unless otherwise noted. 6/8/20  Pain at worst: 8/10  ROM:    Left Right   Elevation (°) 114 with pain 123   Behind neck reach To CTJ with pain To CTJ   Behind back reach To TLJ with pain that radiated down towards elbow To TLJ   Cross body reach To opposite scapula with mild pain To opposite scapula       UE ANDT: All within normal limits B  Strength: Pt able to actively lift L UE and perform S/L ER with mild increase in pain. He could perform S/L abduction without increase in pain. Palpation: TTP throughout entire L neck and shoulder. DASH: 41/55    6/19/20:   L UE elevation to 123 ° with no pain. Pt able to reach in all directions on L [facility] and symmetrical to R.   R cervical rotation to 72 ° , L cervical rotation to 52 ° with L sided pain. Pain with L SB on L side of neck   TREATMENT:   THERAPEUTIC ACTIVITY: (see chart for minutes): Therapeutic activities per grid below to improve mobility, strength, balance and coordination. Required minimal visual, verbal and tactile cues to improve independence with functional mobility and activities. THERAPEUTIC EXERCISE: (see chart for minutes):  Exercises per grid below to improve mobility, strength, balance and coordination. Required minimal visual, verbal and tactile cues to promote proper body alignment and promote proper body mechanics. Progressed resistance, range, repetitions and complexity of movement as indicated.   NEUROMUSCULAR RE-EDUCATION: (see chart for minutes):  Exercise/activities per grid below to improve balance, coordination, kinesthetic sense, posture, pain, and proprioception. Required minimal visual, verbal and tactile cues to promote motor control of right, upper extremity(s). MANUAL THERAPY: (see chart for minutes): Joint mobilization, Soft tissue mobilization, skin mobilization, and muscle energy techniques were utilized and necessary because of the patient's restricted joint motion, restricted motion of soft tissue and pain . MODALITIES: (see chart for minutes): *  Hot Pack Therapy in order to provide analgesia and relieve muscle spasm. Date: 6/19/20 (visit 5) PN 6/22/20 (visit 6)      Modalities:              Therapeutic Exercise: 30 min 40 min      L SB contract-relax with self-palpation: x3  Band ER: 2x10 blue. Band pull-apart: 2x10 blue  Band press: 2x10 B blue  Band ER in abduction: 2x10 B blue  Band D2 flexion: 2x10 B blue UBE: 3'/3' L4  Prone elbow and head lift: x10 B, x10 B with CL LE lift added  Band ER: 2x10 blue. Band pull-apart: 2x10 blue  Band press: 2x10 B blue  Band ER in abduction: 2x10 B blue  Band D2 flexion: 2x10 B blue  Pt educated on home program.     Neuromuscular re-education              Manual Therapy: 10 min 5 min      SB MET in supine: x3 B  MET into R SB for R UT performed as well as STM to L side of cervical paraspinals      Therapeutic Activities:                Home program: 6/8/20: prone elbow lift contract-relax  6/15/20: elbow lift, band ER, band pull-apart, band press, band ER in abduction, band D2 flexion  6/19/20: add SB contract-relax in seated     [facility] Portal  Treatment/Session Summary:    · Response to Treatment: Pt continues to progress well and reported no pain with exercises, just challenge with resistance on L shoulder. He will be seen 1 time this week to determine his response to exercise interventions on his own. · Communication/Consultation:  None today  · Equipment provided today:  6/10/20: red band.  6/15/20: pt given another red band   · Recommendations/Intent for next treatment session: Next visit will focus on improving pain-free cervical AROM and continue shoulder strengthening.      Total Treatment Billable Duration:  45 minutes  PT Patient Time In/Time Out  Time In: 1610  Time Out: Mayte [address] 44, PT, DPT    Future Appointments   Date Time Provider Jacob Camacho   6/29/2020  8:45 AM Patrick Lara MD [facility]   6/29/2020  4:15 PM [place] [facility]   7/1/2020  2:45 PM [place] [facility]   7/6/2020  9:30 AM [place] [facility]   7/8/2020  9:30 AM [place] [facility]   7/13/2020  9:30 AM [place] [facility]   7/15/2020  9:30 AM [facility] [place] [facility]   7/20/2020  9:30 AM [place] [facility]   7/22/2020  9:30 AM [place] [facility]   7/27/2020  9:30 AM [place] [facility]   7/29/2020  9:30 AM [place] [facility]   11/16/2020  9:30 AM Soha Wooten MD [address]

## 2020-07-01 ENCOUNTER — HOSPITAL ENCOUNTER (OUTPATIENT)
Dept: PHYSICAL THERAPY | Age: 85
Discharge: HOME | End: 2020-07-01
Payer: MEDICARE

## 2020-07-01 PROCEDURE — 97110 THERAPEUTIC EXERCISES: CPT

## 2020-07-01 NOTE — PROGRESS NOTES
Celestina Gross  : 1935  [place]  Secondary:  [address] [facility] @ [address] [place] [address], [address] [place] [address]  [phone]   [phone]      OUTPATIENT PHYSICAL THERAPY: Daily Treatment Note 2020  Visit Count:  7    ICD-10: Treatment Diagnosis: Pain in left shoulder (M25.512) and Stiffness of left shoulder, not elsewhere classified (M25.612) and Cervicalgia (M54.2) and Stiffness of unspecified joint, not elsewhere classified (M25.60)  TREATMENT PLAN:  Effective Dates: 2020 TO 2020 (30 days). Frequency/Duration: 2 times a week for 30 Day(s)  GOALS: (Goals have been discussed and agreed upon with patient.)  Short-Term Functional Goals: Time Frame: 2 weeks  # Goal Status   1 Pt will confirm compliance with home program to facilitate improvement in function. MET     Discharge goals: Time frame: 4 weeks   # Goal Status   1 Pt will be able to elevate UE to at least 120° without significant increase in symptoms in order to participate in ADLs such as reaching overhead and pulling shirt on.  20: L UE elevation to 123 °  MET   2 Pt will be able to reach over head to touch CTJ without significant increase in symptoms to be able to participate in ADLs such as reaching overhead and washing head. MET   3 Pt will be able to reach across body to touch spine of opposite scapula without significant increase in symptoms to be able to participate in ADLs such as reaching tasks. MET   4 Pt will be able to reach behind back to touch TLJ without significant increase in symptoms to be able to participate in dressing and toileting activities. MET   5 Pt will be able to reach behind themselves with arm elevated without symptoms to be able to participate in ADLs such as reaching tasks and grabbing seat belt. MET   6 Pt will be able to perform all cervical AROM WNL and pain-free to be able to perform ADLs.  ADDED 20        Pre-treatment Symptoms/Complaints: Pt reports his neck and shoulder have continued to do well. He reports he has been performing his exercises daily. He reports mild tension in L shoulder and neck. Pain: Initial:   No pain, just tension in L shoulder Post Session:  No pain, still tension in L shoulder   Medications Last Reviewed: 7/1/2020  Updated Objective Findings: Below measures from initial evaluation unless otherwise noted. 6/8/20  Pain at worst: 8/10  ROM:    Left Right   Elevation (°) 114 with pain 123   Behind neck reach To CTJ with pain To CTJ   Behind back reach To TLJ with pain that radiated down towards elbow To TLJ   Cross body reach To opposite scapula with mild pain To opposite scapula       UE ANDT: All within normal limits B  Strength: Pt able to actively lift L UE and perform S/L ER with mild increase in pain. He could perform S/L abduction without increase in pain. Palpation: TTP throughout entire L neck and shoulder. DASH: 41/55    6/19/20:   L UE elevation to 123 ° with no pain. Pt able to reach in all directions on L [facility] and symmetrical to R.   R cervical rotation to 72 ° , L cervical rotation to 52 ° with L sided pain. Pain with L SB on L side of neck   TREATMENT:   THERAPEUTIC ACTIVITY: (see chart for minutes): Therapeutic activities per grid below to improve mobility, strength, balance and coordination. Required minimal visual, verbal and tactile cues to improve independence with functional mobility and activities. THERAPEUTIC EXERCISE: (see chart for minutes):  Exercises per grid below to improve mobility, strength, balance and coordination. Required minimal visual, verbal and tactile cues to promote proper body alignment and promote proper body mechanics. Progressed resistance, range, repetitions and complexity of movement as indicated.   NEUROMUSCULAR RE-EDUCATION: (see chart for minutes):  Exercise/activities per grid below to improve balance, coordination, kinesthetic sense, posture, pain, and proprioception. Required minimal visual, verbal and tactile cues to promote motor control of right, upper extremity(s). MANUAL THERAPY: (see chart for minutes): Joint mobilization, Soft tissue mobilization, skin mobilization, and muscle energy techniques were utilized and necessary because of the patient's restricted joint motion, restricted motion of soft tissue and pain . MODALITIES: (see chart for minutes): *  Hot Pack Therapy in order to provide analgesia and relieve muscle spasm. Date: 6/19/20 (visit 5) PN 6/22/20 (visit 6)  7/1/20 (visit 7)     Modalities:              Therapeutic Exercise: 30 min 40 min 43 min     L SB contract-relax with self-palpation: x3  Band ER: 2x10 blue. Band pull-apart: 2x10 blue  Band press: 2x10 B blue  Band ER in abduction: 2x10 B blue  Band D2 flexion: 2x10 B blue UBE: 3'/3' L4  Prone elbow and head lift: x10 B, x10 B with CL LE lift added  Band ER: 2x10 blue. Band pull-apart: 2x10 blue  Band press: 2x10 B blue  Band ER in abduction: 2x10 B blue  Band D2 flexion: 2x10 B blue  Pt educated on home program. Prone elbow and head lift: x10 B   Doorway stretch with contralateral rotation: 2x5 B   Chin tuck: 2x8. Pt given tactile cues for guidance  Band ER: 2x10 blue.   Band pull-apart: 2x10 blue  Band press: 2x10 B blue  Band ER in abduction: 2x10 B blue  Band D2 flexion: 2x10 B blue    Neuromuscular re-education              Manual Therapy: 10 min 5 min      SB MET in supine: x3 B  MET into R SB for R UT performed as well as STM to L side of cervical paraspinals      Therapeutic Activities:                Home program: 6/8/20: prone elbow lift contract-relax  6/15/20: elbow lift, band ER, band pull-apart, band press, band ER in abduction, band D2 flexion  6/19/20: add SB contract-relax in seated     [employer] Portal  Treatment/Session Summary:    · Response to Treatment: Pt tolerated exercises well and confirmed that new doorway stretch addressed tension in L shoulder. · Communication/Consultation:  None today  · Equipment provided today:  6/10/20: red band. 6/15/20: pt given another red band   · Recommendations/Intent for next treatment session: Next visit will focus on improving pain-free cervical AROM and continue shoulder strengthening.      Total Treatment Billable Duration:  43 minutes  PT Patient Time In/Time Out  Time In: 7243  Time Out: [address], PT, DPT    Future Appointments   Date Time Provider Jacob Camacho   7/6/2020  9:30 AM Vevelyn Shaggy [facility]   7/8/2020  9:30 AM Vevelyn Shaggy [facility]   7/8/2020  1:30 PM S[facility] US LOGIC 7 UNIT 2 [facility]   7/13/2020  9:30 AM Vevelyn Shaggy [facility]   7/15/2020  9:30 AM Vevelyn Shaggy [facility]   7/20/2020  9:30 AM Vevelyn Shaggy [facility]   7/22/2020  9:30 AM Vevelyn Shaggy [facility]   7/27/2020  9:30 AM Vevelyn Shaggy [facility]   7/29/2020  9:30 AM Vevelyn Shaggy [facility]   10/30/2020  8:00 AM Leighann Bustamante MD [facility]   11/16/2020  9:30 AM David Varela MD [facility]

## 2020-07-06 ENCOUNTER — HOSPITAL ENCOUNTER (OUTPATIENT)
Dept: ULTRASOUND IMAGING | Age: 85
Discharge: HOME | End: 2020-07-06
Attending: INTERNAL MEDICINE
Payer: MEDICARE

## 2020-07-06 ENCOUNTER — HOSPITAL ENCOUNTER (OUTPATIENT)
Dept: PHYSICAL THERAPY | Age: 85
Discharge: HOME | End: 2020-07-06
Payer: MEDICARE

## 2020-07-06 DIAGNOSIS — I65.21 STENOSIS OF RIGHT CAROTID ARTERY: ICD-10-CM

## 2020-07-06 PROCEDURE — 97110 THERAPEUTIC EXERCISES: CPT

## 2020-07-06 PROCEDURE — 93880 EXTRACRANIAL BILAT STUDY: CPT

## 2020-07-06 NOTE — PROGRESS NOTES
Olivia Wasserman  : 1935  [place]  Secondary:  Nati Chi @ [place] [address].  [phone]   [phone]      OUTPATIENT PHYSICAL THERAPY: Daily Treatment and discharge Note 2020  Visit Count:  8    ICD-10: Treatment Diagnosis: Pain in left shoulder (M25.512) and Stiffness of left shoulder, not elsewhere classified (M25.612) and Cervicalgia (M54.2) and Stiffness of unspecified joint, not elsewhere classified (M25.60)  TREATMENT PLAN:  Effective Dates: 2020 TO 2020 (30 days). Frequency/Duration: 2 times a week for 30 Day(s)  GOALS: (Goals have been discussed and agreed upon with patient.)  Short-Term Functional Goals: Time Frame: 2 weeks  # Goal Status   1 Pt will confirm compliance with home program to facilitate improvement in function. MET     Discharge goals: Time frame: 4 weeks   # Goal Status   1 Pt will be able to elevate UE to at least 120° without significant increase in symptoms in order to participate in ADLs such as reaching overhead and pulling shirt on.  20: L UE elevation to 123 °  MET   2 Pt will be able to reach over head to touch CTJ without significant increase in symptoms to be able to participate in ADLs such as reaching overhead and washing head. MET   3 Pt will be able to reach across body to touch spine of opposite scapula without significant increase in symptoms to be able to participate in ADLs such as reaching tasks. MET   4 Pt will be able to reach behind back to touch TLJ without significant increase in symptoms to be able to participate in dressing and toileting activities. MET   5 Pt will be able to reach behind themselves with arm elevated without symptoms to be able to participate in ADLs such as reaching tasks and grabbing seat belt.  MET   6 Pt will be able to perform all cervical AROM WNL and pain-free to be able to perform ADLs.  20: L rotation to 75 ° , R rotation to 70 ° with no pain ADDED 6/19/20   MET       Pre-treatment Symptoms/Complaints: Pt reports his neck and shoulder feel good. He reports no limitation with ADLs or recreational activities. He reports he can tell his L shoulder gets tired faster than R but will continue doing exercises to build endurance. Pain: Initial:   No pain Post Session:  No pain   Medications Last Reviewed: 7/6/2020  Updated Objective Findings: Below measures from initial evaluation unless otherwise noted. 6/8/20  Pain at worst: 8/10  ROM:    Left Right   Elevation (°) 114 with pain 123   Behind neck reach To CTJ with pain To CTJ   Behind back reach To TLJ with pain that radiated down towards elbow To TLJ   Cross body reach To opposite scapula with mild pain To opposite scapula       UE ANDT: All within normal limits B  Strength: Pt able to actively lift L UE and perform S/L ER with mild increase in pain. He could perform S/L abduction without increase in pain. Palpation: TTP throughout entire L neck and shoulder. DASH: 41/55    6/19/20:   L UE elevation to 123 ° with no pain. Pt able to reach in all directions on L [facility] and symmetrical to R.   R cervical rotation to 72 ° , L cervical rotation to 52 ° with L sided pain. Pain with L SB on L side of neck   TREATMENT:   THERAPEUTIC ACTIVITY: (see chart for minutes): Therapeutic activities per grid below to improve mobility, strength, balance and coordination. Required minimal visual, verbal and tactile cues to improve independence with functional mobility and activities. THERAPEUTIC EXERCISE: (see chart for minutes):  Exercises per grid below to improve mobility, strength, balance and coordination. Required minimal visual, verbal and tactile cues to promote proper body alignment and promote proper body mechanics. Progressed resistance, range, repetitions and complexity of movement as indicated.   NEUROMUSCULAR RE-EDUCATION: (see chart for minutes):  Exercise/activities per grid below to improve balance, coordination, kinesthetic sense, posture, pain, and proprioception. Required minimal visual, verbal and tactile cues to promote motor control of right, upper extremity(s). MANUAL THERAPY: (see chart for minutes): Joint mobilization, Soft tissue mobilization, skin mobilization, and muscle energy techniques were utilized and necessary because of the patient's restricted joint motion, restricted motion of soft tissue and pain . MODALITIES: (see chart for minutes): *  Hot Pack Therapy in order to provide analgesia and relieve muscle spasm. Date: 6/19/20 (visit 5) PN 6/22/20 (visit 6)  7/1/20 (visit 7)  7/6/20 (visit 8)    Modalities:              Therapeutic Exercise: 30 min 40 min 43 min 38 min    L SB contract-relax with self-palpation: x3  Band ER: 2x10 blue. Band pull-apart: 2x10 blue  Band press: 2x10 B blue  Band ER in abduction: 2x10 B blue  Band D2 flexion: 2x10 B blue UBE: 3'/3' L4  Prone elbow and head lift: x10 B, x10 B with CL LE lift added  Band ER: 2x10 blue. Band pull-apart: 2x10 blue  Band press: 2x10 B blue  Band ER in abduction: 2x10 B blue  Band D2 flexion: 2x10 B blue  Pt educated on home program. Prone elbow and head lift: x10 B   Doorway stretch with contralateral rotation: 2x5 B   Chin tuck: 2x8. Pt given tactile cues for guidance  Band ER: 2x10 blue. Band pull-apart: 2x10 blue  Band press: 2x10 B blue  Band ER in abduction: 2x10 B blue  Band D2 flexion: 2x10 B blue Prone elbow and head lift: x10 B   Doorway stretch with contralateral rotation: 2x10 B   Chin tuck: 2x10  Band ER: 2x10 blue.   Band pull-apart: 2x10 blue  Band press: 2x10 B black  Band ER in abduction: 2x10 B blue  Band D2 flexion: 2x10 B blue   Neuromuscular re-education              Manual Therapy: 10 min 5 min      SB MET in supine: x3 B  MET into R SB for R UT performed as well as STM to L side of cervical paraspinals      Therapeutic Activities:                Home program: 6/8/20: prone elbow lift contract-relax  6/15/20: elbow lift, band ER, band pull-apart, band press, band ER in abduction, band D2 flexion  6/19/20: add SB contract-relax in seated     [employer] Portal  Treatment/Session Summary:    · Response to Treatment: Pt has responded well and reports no pain with shoulder and neck AROM and no limitation with all activities. He confirmed understanding of home program and was given a black band today. He is being discharged at this time which he agreed to. · Communication/Consultation:  None today  · Equipment provided today:  6/10/20: red band. 6/15/20: pt given another red band.  7/6/20: black band   · Recommendations: D/C to home program    Total Treatment Billable Duration:  38 minutes  PT Patient Time In/Time Out  Time In: 0930  Time Out: [address], PT, DPT    Future Appointments   Date Time Provider Jacob Camacho   7/8/2020  1:30 PM [facility]   10/30/2020  8:00 AM Mekhi Wilson MD [facility]   11/16/2020  9:30 AM Anne Baxter MD [address] [place] [address]

## 2020-07-08 ENCOUNTER — APPOINTMENT (OUTPATIENT)
Dept: PHYSICAL THERAPY | Age: 85
End: 2020-07-08
Payer: MEDICARE

## 2020-07-13 ENCOUNTER — APPOINTMENT (OUTPATIENT)
Dept: PHYSICAL THERAPY | Age: 85
End: 2020-07-13
Payer: MEDICARE

## 2020-07-15 ENCOUNTER — APPOINTMENT (OUTPATIENT)
Dept: PHYSICAL THERAPY | Age: 85
End: 2020-07-15
Payer: MEDICARE

## 2020-07-20 ENCOUNTER — APPOINTMENT (OUTPATIENT)
Dept: PHYSICAL THERAPY | Age: 85
End: 2020-07-20
Payer: MEDICARE

## 2020-07-22 ENCOUNTER — APPOINTMENT (OUTPATIENT)
Dept: PHYSICAL THERAPY | Age: 85
End: 2020-07-22
Payer: MEDICARE

## 2020-07-27 ENCOUNTER — APPOINTMENT (OUTPATIENT)
Dept: PHYSICAL THERAPY | Age: 85
End: 2020-07-27
Payer: MEDICARE

## 2020-07-29 ENCOUNTER — APPOINTMENT (OUTPATIENT)
Dept: PHYSICAL THERAPY | Age: 85
End: 2020-07-29
Payer: MEDICARE

## 2020-08-19 NOTE — PROGRESS NOTES
I am accessing Mr. Omi Ho chart as a part of our department's internal chart auditing process. I certify that Mr. Sherrell Prasad is, or was, a patient in our department.   Thank you,  Jad Haskins, PT  8/19/2020

## 2022-03-19 PROBLEM — R06.02 SHORTNESS OF BREATH: Status: ACTIVE | Noted: 2018-10-10

## 2022-06-06 ENCOUNTER — HOSPITAL ENCOUNTER (OUTPATIENT)
Dept: GENERAL RADIOLOGY | Age: 87
Discharge: HOME | End: 2022-06-09
Payer: MEDICARE

## 2022-06-06 DIAGNOSIS — M25.512 ACUTE PAIN OF LEFT SHOULDER: ICD-10-CM

## 2022-06-06 PROCEDURE — 73030 X-RAY EXAM OF SHOULDER: CPT

## 2022-10-10 ENCOUNTER — OFFICE VISIT (OUTPATIENT)
Dept: CARDIOLOGY CLINIC | Age: 87
End: 2022-10-10
Payer: MEDICARE

## 2022-10-10 VITALS
SYSTOLIC BLOOD PRESSURE: 130 MMHG | DIASTOLIC BLOOD PRESSURE: 80 MMHG | HEIGHT: 62 IN | WEIGHT: 126.8 LBS | HEART RATE: 53 BPM | BODY MASS INDEX: 23.34 KG/M2

## 2022-10-10 DIAGNOSIS — R07.89 CHEST DISCOMFORT: Primary | ICD-10-CM

## 2022-10-10 DIAGNOSIS — E78.5 DYSLIPIDEMIA: ICD-10-CM

## 2022-10-10 DIAGNOSIS — E11.22 TYPE 2 DIABETES MELLITUS WITH STAGE 3A CHRONIC KIDNEY DISEASE, WITHOUT LONG-TERM CURRENT USE OF INSULIN (HCC): ICD-10-CM

## 2022-10-10 DIAGNOSIS — N18.31 STAGE 3A CHRONIC KIDNEY DISEASE (HCC): ICD-10-CM

## 2022-10-10 DIAGNOSIS — N18.31 TYPE 2 DIABETES MELLITUS WITH STAGE 3A CHRONIC KIDNEY DISEASE, WITHOUT LONG-TERM CURRENT USE OF INSULIN (HCC): ICD-10-CM

## 2022-10-10 DIAGNOSIS — I10 ESSENTIAL HYPERTENSION: ICD-10-CM

## 2022-10-10 PROCEDURE — 1036F TOBACCO NON-USER: CPT | Performed by: INTERNAL MEDICINE

## 2022-10-10 PROCEDURE — G8484 FLU IMMUNIZE NO ADMIN: HCPCS | Performed by: INTERNAL MEDICINE

## 2022-10-10 PROCEDURE — 1123F ACP DISCUSS/DSCN MKR DOCD: CPT | Performed by: INTERNAL MEDICINE

## 2022-10-10 PROCEDURE — 93000 ELECTROCARDIOGRAM COMPLETE: CPT | Performed by: INTERNAL MEDICINE

## 2022-10-10 PROCEDURE — G8420 CALC BMI NORM PARAMETERS: HCPCS | Performed by: INTERNAL MEDICINE

## 2022-10-10 PROCEDURE — G8427 DOCREV CUR MEDS BY ELIG CLIN: HCPCS | Performed by: INTERNAL MEDICINE

## 2022-10-10 PROCEDURE — 99214 OFFICE O/P EST MOD 30 MIN: CPT | Performed by: INTERNAL MEDICINE

## 2022-10-10 RX ORDER — IPRATROPIUM BROMIDE 21 UG/1
SPRAY, METERED NASAL
COMMUNITY
Start: 2022-06-20

## 2022-10-10 RX ORDER — ACETAMINOPHEN 500 MG
500 TABLET ORAL EVERY 6 HOURS PRN
COMMUNITY

## 2022-10-10 ASSESSMENT — ENCOUNTER SYMPTOMS
SHORTNESS OF BREATH: 0
BLURRED VISION: 1

## 2022-10-10 NOTE — PROGRESS NOTES
[facility] CARDIOLOGY  [address], [address]  PHONE: [phone]      10/10/22    NAME:  Jd Martinez  : 1935  MRN: [id number]         SUBJECTIVE:   Jd Martinez is a 82 y.o. male seen for a follow up visit regarding the following:     Chief Complaint   Patient presents with    Annual Exam     Chest discomfort    Irregular Heart Beat            HPI:  Follow up  Annual Exam (Chest discomfort) and Irregular Heart Beat   . Follow up chronic atypical shoulder and chest pain, hypertension, dyslipidemia, DM, CKD. Patient states he has myriad problems. He has brain fog he attributes to prior COVID, left facial numbness, imbalance, blurred vision. He had normal carotid artery doppler just 2 years ago. His symptoms are constant. He has follow up at the [address] [facility] tomorrow for eye exam He continues to walk  2 miles a day several days a week without chest discomfort or dyspnea. Past cardiac history: Oct 2018 - vasodilator perfusion study normal   May 2020- normal carotid doppler   Mar 2021-24 hr average BP is elevated at 158/69 with a range of 109/35 (1 am) - 203/108 (afternoon). Absence of nocturnal dipping with nighttime average 155/70, daytime average 159/69. There are 2 peaks, one ~ 2 pm and one ~ 11 pm.  Recommend we add  a low dose amlodipine, 5 mg, taking it mid day if possible, for smoother control. Of course, continue regular exercise and low Na diet. Given his CKD would tolerate average in the 140's if we can get it there, avoid pushing too low to spare renal perfusion. Follow up as planned on the .  Thanks          [facility] CAD CHF Meds            amLODIPine (NORVASC) 5 MG tablet (Taking)    Class: Historical Med    carvedilol (COREG) 12.5 MG tablet (Taking)    Class: Historical Med    losartan (COZAAR) 100 MG tablet (Taking)    Class: Historical Med    simvastatin (ZOCOR) 20 MG tablet (Taking)    Class: Historical Med              Past Medical History, Past Surgical History, Family history, Social History, and Medications were all reviewed with the patient today and updated as necessary. Prior to Admission medications    Medication Sig Start Date End Date Taking?  Authorizing Provider   ipratropium (ATROVENT) 0.03 % nasal spray USE 2 SPRAYS IN EACH NOSTRIL UP TO THREE TIMES DAILY AS NEEDED 6/20/22  Yes Historical Provider, MD   acetaminophen (TYLENOL) 500 MG tablet Take 500 mg by mouth every 6 hours as needed for Pain   Yes Historical Provider, MD   amLODIPine (NORVASC) 5 MG tablet Take 5 mg by mouth daily 3/18/21  Yes Ar Automatic Reconciliation   carboxymethylcellulose (THERATEARS) 1 % ophthalmic gel Apply to eye 2 times daily   Yes Ar Automatic Reconciliation   carvedilol (COREG) 12.5 MG tablet Take 12.5 mg by mouth 2 times daily (with meals) 6/29/20  Yes Ar Automatic Reconciliation   clopidogrel (PLAVIX) 75 MG tablet Take 75 mg by mouth daily 6/29/20  Yes Ar Automatic Reconciliation   desonide (DESOWEN) 0.05 % cream Apply topically 2 times daily 3/2/20  Yes Ar Automatic Reconciliation   glipiZIDE (GLUCOTROL) 5 MG tablet Take 2.5 mg by mouth daily 6/29/20  Yes Ar Automatic Reconciliation   losartan (COZAAR) 100 MG tablet Take 100 mg by mouth daily 6/29/20  Yes Ar Automatic Reconciliation   pantoprazole (PROTONIX) 40 MG tablet Take 40 mg by mouth daily 6/29/20  Yes Ar Automatic Reconciliation   simvastatin (ZOCOR) 20 MG tablet Take 20 mg by mouth 6/29/20  Yes Ar Automatic Reconciliation   traZODone (DESYREL) 50 MG tablet TAKE 1-2 TABLETS BY MOUTH EVERY NIGHT AT BEDTIME AS NEEDED FOR SLEEP 6/29/20  Yes Ar Automatic Reconciliation   vitamin D (CHOLECALCIFEROL) 25 MCG (1000 UT) TABS tablet Take 1,000 Units by mouth daily  Patient not taking: Reported on 10/10/2022    Ar Automatic Reconciliation     Allergies   Allergen Reactions    Zinc Rash    Enalapril      Other reaction(s): Abdominal pain, Abdominal pain-Intolerance  Other reaction(s): Abdominal pain-Intolerance       Past Medical History:   Diagnosis Date    Arrhythmia     since a child, benign per patient     Arthritis     BPPV (benign paroxysmal positional vertigo)     Carotid stenosis     Hx of left endarterectomy    Chronic kidney disease     long hx of renal insufficiency    Depression     much improved at this time    Diabetes ([facility] [address] dx     josee gabriel 100, check glucose 3 times a week, last A1C was 7, does not recognize lows    GERD (gastroesophageal reflux disease)     controlled by medication    Hyperlipemia     Hypertension     Osteoarthritis, knee     PUD (peptic ulcer disease) 2016    treated with protonix    TIA (transient ischemic attack)          TIA (transient ischemic attack) 2003     my weakness on left side    Varicella      Past Surgical History:   Procedure Laterality Date    CAROTID ENDARTERECTOMY  2002    Left Sided    COLONOSCOPY  2016    HEMORRHOID SURGERY  2007    LUMBAR LAMINECTOMY      T5-T6    OTHER SURGICAL HISTORY      Sinus    OTHER SURGICAL HISTORY      elbow surgery    THORACIC LAMINECTOMY  2004     Family History   Problem Relation Age of Onset    Diabetes Brother     Hypertension Sister     Hypertension Father     Heart Disease Mother     Diabetes Mother     Diabetes Brother     Heart Disease Sister      Social History     Tobacco Use    Smoking status: Former     Types: Cigarettes     Quit date: 1986     Years since quittin.1    Smokeless tobacco: Never    Tobacco comments:     Quit smoking: pipe smoker 2 geraldine/day   Substance Use Topics    Alcohol use: Yes     Alcohol/week: 1.0 standard drink       ROS:    Review of Systems   Eyes:  Positive for blurred vision. Cardiovascular:  Negative for chest pain. Respiratory:  Negative for shortness of breath. Neurological:  Positive for loss of balance and numbness.         PHYSICAL EXAM:   /80   Pulse 53   Ht 5' 2\" (1.575 m)   Wt 126 lb 12.8 oz (57.5 kg)   BMI 23.19 kg/m²      Wt Readings from Last 3 Encounters:   10/10/22 126 lb 12.8 oz (57.5 kg)   04/27/22 130 lb 6.4 oz (59.1 kg)   09/29/21 125 lb (56.7 kg)     BP Readings from Last 3 Encounters:   10/10/22 130/80   09/29/21 (!) 172/58   03/25/21 (!) 108/46     Pulse Readings from Last 3 Encounters:   10/10/22 53   09/29/21 54   03/25/21 60           Physical Exam  Constitutional:       Appearance: Normal appearance. HENT:      Head: Normocephalic and atraumatic. Eyes:      Conjunctiva/sclera: Conjunctivae normal.   Neck:      Vascular: No carotid bruit. Cardiovascular:      Rate and Rhythm: Normal rate and regular rhythm. Heart sounds: No murmur heard. No friction rub. No gallop. Pulmonary:      Effort: No respiratory distress. Breath sounds: No wheezing or rales. Musculoskeletal:         General: No swelling. Cervical back: Neck supple. Skin:     General: Skin is warm and dry. Neurological:      General: No focal deficit present. Mental Status: He is alert. Psychiatric:         Mood and Affect: Mood normal.         Behavior: Behavior normal.       Medical problems and test results were reviewed with the patient today.      DATA REVIEW    LIPID PANEL     Lab Results   Component Value Date    CHOL 146 06/26/2019     Lab Results   Component Value Date    TRIG 76 06/26/2019     Lab Results   Component Value Date    HDL 60 06/26/2019     Lab Results   Component Value Date    LDLCALC 71 06/26/2019     Lab Results   Component Value Date    LABVLDL 15 06/26/2019     No results found for: CHOLHDLRATIO    BMP  Lab Results   Component Value Date/Time     05/11/2020 11:56 AM    K 4.4 05/11/2020 11:56 AM     05/11/2020 11:56 AM    CO2 27 05/11/2020 11:56 AM    BUN 28 05/11/2020 11:56 AM    CREATININE 1.67 05/11/2020 11:56 AM    GLUCOSE 114 05/11/2020 11:56 AM    CALCIUM 9.3 05/11/2020 11:56 AM       8/3/22 1042    TSH 0.45 - 4.5 uIU/mL 1.900     8/3/22 1042     Hemoglobin A1c 4.8 - 5.6 % 6.6 High      8/3/22 1042     Cholesterol 100 - 199 mg/dL 144    Triglycerides 0 - 149 mg/dL 79    HDL Cholesterol >39 mg/dL 60    VLDL Cholesterol 5 - 40 mg/dL 15    LDL, Calculated 0 - 99 mg/dL 69    Chol/HDL Ratio 0 - 5 ratio 2.4      8/25/22 1346     Glucose 65 - 99 mg/dL 142 High     BUN 8 - 27 mg/dL 18    Creatinine 0.76 - 1.27 mg/dL 1.44 High     BUN/Creat Ratio 10 - 24 13    Sodium 134 - 144 mmol/L 138    Potassium 3.5 - 5.2 mmol/L 4.3    Chloride 96 - 106 mmol/L 100    CO2 20 - 29 mmol/L 25    Calcium 8.6 - 10.2 mg/dL 9.4    Phosphorus 2.8 - 4.1 mg/dL 3    Albumin 3.6 - 4.6 g/dL 4.4    eGFR >59 mL/min/1.73 48 Low         EKG    Sinus  Bradycardia 53  normal axis, intervals, ST and T waves        CXR/IMAGING        DEVICE INTERROGATION        OUTSIDE RECORDS REVIEW    Records from outside providers have been reviewed and summarized as noted in the HPI, past history and data review sections of this note, and reviewed with patient. .       [facility] was seen today for annual exam and irregular heart beat. Diagnoses and all orders for this visit:    Chest discomfort  -     EKG 12 lead    Essential hypertension    Stage 3a chronic kidney disease ([facility] [address]    Type 2 diabetes mellitus with stage 3a chronic kidney disease, without long-term current use of insulin ([facility])    Dyslipidemia        IMPRESSION:    No angina, continue meds and lifestyle modification    From a cardiovascular standpoint he continues to look very well with BP, DM and lipids under good control  (reviewed with patient from outside source) and no exertional symptoms at good workload. His other symptoms of brain fog, constant facial numbness, blurred vision could certainly be post COVID or related to his documented cervical spine stenosis. He has recent carotid artery imaging that was normal.  Continue to follow up these additional symptoms with his PCP and the [address] [facility] and continued risk modification.  ER for localizing or worsening symptoms. Stable stage 3 CKD. Return in about 1 year (around 10/10/2023). Thank you for allowing me to participate in this patient's care. Please call or contact me if there are any questions or concerns regarding the above.       Sharyle Papa, MD  10/10/22  8:33 AM

## 2022-10-10 NOTE — PATIENT INSTRUCTIONS
Patient Education        High Blood Pressure: Care Instructions  Overview     It's normal for blood pressure to go up and down throughout the day. But if it stays up, you have high blood pressure. Another name for high blood pressure is hypertension. Despite what a lot of people think, high blood pressure usually doesn't cause headaches or make you feel dizzy or lightheaded. It usually has no symptoms. But it does increase your risk of stroke, heart attack, and other problems. You and your doctor will talk about your risks of these problems based on your blood pressure. Your doctor will give you a goal for your blood pressure. Your goal will be based on your health and your age. Lifestyle changes, such as eating healthy and being active, are always important to help lower blood pressure. You might also take medicine to reach your blood pressure goal.  Follow-up care is a key part of your treatment and safety. Be sure to make and go to all appointments, and call your doctor if you are having problems. It's also a good idea to know your test results and keep a list of the medicines you take. How can you care for yourself at home? Medical treatment  If you stop taking your medicine, your blood pressure will go back up. You may take one or more types of medicine to lower your blood pressure. Be safe with medicines. Take your medicine exactly as prescribed. Call your doctor if you think you are having a problem with your medicine. Talk to your doctor before you start taking aspirin every day. Aspirin can help certain people lower their risk of a heart attack or stroke. But taking aspirin isn't right for everyone, because it can cause serious bleeding. See your doctor regularly. You may need to see the doctor more often at first or until your blood pressure comes down. If you are taking blood pressure medicine, talk to your doctor before you take decongestants or anti-inflammatory medicine, such as ibuprofen.  Some of these medicines can raise blood pressure. Learn how to check your blood pressure at home. Lifestyle changes  Stay at a healthy weight. This is especially important if you put on weight around the waist. Losing even 10 pounds can help you lower your blood pressure. If your doctor recommends it, get more exercise. Walking is a good choice. Bit by bit, increase the amount you walk every day. Try for at least 30 minutes on most days of the week. You also may want to swim, bike, or do other activities. Avoid or limit alcohol. Talk to your doctor about whether you can drink any alcohol. Try to limit how much sodium you eat to less than 2,300 milligrams (mg) a day. Your doctor may ask you to try to eat less than 1,500 mg a day. Eat plenty of fruits (such as bananas and oranges), vegetables, legumes, whole grains, and low-fat dairy products. Lower the amount of saturated fat in your diet. Saturated fat is found in animal products such as milk, cheese, and meat. Limiting these foods may help you lose weight and also lower your risk for heart disease. Do not smoke. Smoking increases your risk for heart attack and stroke. If you need help quitting, talk to your doctor about stop-smoking programs and medicines. These can increase your chances of quitting for good. When should you call for help? Call 911  anytime you think you may need emergency care. This may mean having symptoms that suggest that your blood pressure is causing a serious heart or blood vessel problem. Your blood pressure may be over 180/120. For example, call 911 if:    You have symptoms of a heart attack. These may include:  Chest pain or pressure, or a strange feeling in the chest.  Sweating. Shortness of breath. Nausea or vomiting. Pain, pressure, or a strange feeling in the back, neck, jaw, or upper belly or in one or both shoulders or arms. Lightheadedness or sudden weakness. A fast or irregular heartbeat.      You have symptoms of a stroke. These may include:  Sudden numbness, tingling, weakness, or loss of movement in your face, arm, or leg, especially on only one side of your body. Sudden vision changes. Sudden trouble speaking. Sudden confusion or trouble understanding simple statements. Sudden problems with walking or balance. A sudden, severe headache that is different from past headaches. You have severe back or belly pain. Do not wait until your blood pressure comes down on its own. Get help right away. Call your doctor now or seek immediate care if:    Your blood pressure is much higher than normal (such as 180/120 or higher), but you don't have symptoms. You think high blood pressure is causing symptoms, such as:  Severe headache. Blurry vision. Watch closely for changes in your health, and be sure to contact your doctor if:    Your blood pressure measures higher than your doctor recommends at least 2 times. That means the top number is higher or the bottom number is higher, or both. You think you may be having side effects from your blood pressure medicine. Where can you learn more? Go to [email or web address] and sign in to your [employer] account. Enter S793 in the [employer] box to learn more about \"High Blood Pressure: Care Instructions. \"     If you do not have an account, please click on the \"Sign Up Now\" link. Current as of: October 6, 2021               Content Version: 13.4  © 7347-8130 Healthwise, Incorporated. Care instructions adapted under license by [facility] [employer] [facility] ([facility]). If you have questions about a medical condition or this instruction, always ask your healthcare professional. [address] any warranty or liability for your use of this information. Please visit [email or web address] org for more information regarding cardiovascular disease prevention and treatment.

## 2022-12-09 ENCOUNTER — OFFICE VISIT (OUTPATIENT)
Dept: ORTHOPEDIC SURGERY | Age: 87
End: 2022-12-09

## 2022-12-09 DIAGNOSIS — M17.0 BILATERAL PRIMARY OSTEOARTHRITIS OF KNEE: Primary | ICD-10-CM

## 2022-12-09 RX ORDER — TRIAMCINOLONE ACETONIDE 40 MG/ML
40 INJECTION, SUSPENSION INTRA-ARTICULAR; INTRAMUSCULAR ONCE
Status: COMPLETED | OUTPATIENT
Start: 2022-12-09 | End: 2022-12-09

## 2022-12-09 RX ADMIN — TRIAMCINOLONE ACETONIDE 40 MG: 40 INJECTION, SUSPENSION INTRA-ARTICULAR; INTRAMUSCULAR at 08:51

## 2022-12-09 NOTE — PROGRESS NOTES
Name: Bud Saez  YOB: 1935  Gender: male  MRN: [id number]    CC:   Chief Complaint   Patient presents with    Knee Pain     Bilateral knee pain/ Cortisone injections          DIAGNOSIS:   Encounter Diagnosis   Name Primary? Bilateral primary osteoarthritis of knee Yes        HPI:   The pain has been present for years and is becoming worse. It hurts at night when sleeping. The pain is located over the knee, bilateral.  It does hurt to walk and gets worse with increased distances. The pain does not radiate down the leg. Numbness and tingling are not noted.    Treatment so far has been injection therapies which have done well in the past      Current Outpatient Medications:     ipratropium (ATROVENT) 0.03 % nasal spray, USE 2 SPRAYS IN EACH NOSTRIL UP TO THREE TIMES DAILY AS NEEDED, Disp: , Rfl:     acetaminophen (TYLENOL) 500 MG tablet, Take 500 mg by mouth every 6 hours as needed for Pain, Disp: , Rfl:     amLODIPine (NORVASC) 5 MG tablet, Take 5 mg by mouth daily, Disp: , Rfl:     carboxymethylcellulose (THERATEARS) 1 % ophthalmic gel, Apply to eye 2 times daily, Disp: , Rfl:     carvedilol (COREG) 12.5 MG tablet, Take 12.5 mg by mouth 2 times daily (with meals), Disp: , Rfl:     vitamin D (CHOLECALCIFEROL) 25 MCG (1000 UT) TABS tablet, Take 1,000 Units by mouth daily (Patient not taking: Reported on 10/10/2022), Disp: , Rfl:     clopidogrel (PLAVIX) 75 MG tablet, Take 75 mg by mouth daily, Disp: , Rfl:     desonide (DESOWEN) 0.05 % cream, Apply topically 2 times daily, Disp: , Rfl:     glipiZIDE (GLUCOTROL) 5 MG tablet, Take 2.5 mg by mouth daily, Disp: , Rfl:     losartan (COZAAR) 100 MG tablet, Take 100 mg by mouth daily, Disp: , Rfl:     pantoprazole (PROTONIX) 40 MG tablet, Take 40 mg by mouth daily, Disp: , Rfl:     simvastatin (ZOCOR) 20 MG tablet, Take 20 mg by mouth, Disp: , Rfl:     traZODone (DESYREL) 50 MG tablet, TAKE 1-2 TABLETS BY MOUTH EVERY NIGHT AT BEDTIME AS NEEDED FOR SLEEP, Disp: , Rfl:   Allergies   Allergen Reactions    Zinc Rash    Enalapril      Other reaction(s): Abdominal pain, Abdominal pain-Intolerance  Other reaction(s): Abdominal pain-Intolerance       Past Medical History:   Diagnosis Date    Arrhythmia     since a child, benign per patient     Arthritis     BPPV (benign paroxysmal positional vertigo)     Carotid stenosis     Hx of left endarterectomy    Chronic kidney disease     long hx of renal insufficiency    Depression     much improved at this time    Diabetes ([address] dx     typehenry, avgfbs 100, check glucose 3 times a week, last A1C was 7, does not recognize lows    GERD (gastroesophageal reflux disease)     controlled by medication    Hyperlipemia     Hypertension     Osteoarthritis, knee     PUD (peptic ulcer disease) 2016    treated with protonix    TIA (transient ischemic attack)          TIA (transient ischemic attack)      my weakness on left side    Varicella      . pmh  Past Surgical History:   Procedure Laterality Date    CAROTID ENDARTERECTOMY  2002    Left Sided    COLONOSCOPY  2016    HEMORRHOID SURGERY  2007    LUMBAR LAMINECTOMY      T5-T6    OTHER SURGICAL HISTORY  2005    Sinus    OTHER SURGICAL HISTORY      elbow surgery    THORACIC LAMINECTOMY  2004     Family History   Problem Relation Age of Onset    Diabetes Brother     Hypertension Sister     Hypertension Father     Heart Disease Mother     Diabetes Mother     Diabetes Brother     Heart Disease Sister      Social History     Socioeconomic History    Marital status:      Spouse name: Not on file    Number of children: Not on file    Years of education: Not on file    Highest education level: Not on file   Occupational History    Not on file   Tobacco Use    Smoking status: Former     Types: Cigarettes     Quit date: 1986     Years since quittin.3    Smokeless tobacco: Never    Tobacco comments:     Quit smoking: pipe smoker 2 geraldine/day Substance and Sexual Activity    Alcohol use: Yes     Alcohol/week: 1.0 standard drink    Drug use: No    Sexual activity: Not on file   Other Topics Concern    Not on file   Social History Narrative    Has 2 sons. Used to live in [place], [place] and [place], [place]. Social Determinants of Health     Financial Resource Strain: Not on file   Food Insecurity: Not on file   Transportation Needs: Not on file   Physical Activity: Not on file   Stress: Not on file   Social Connections: Not on file   Intimate Partner Violence: Not on file   Housing Stability: Not on file       Review of Systems:  As per HPI. Pertinent positives and negatives are addressed with the patient, particularly those related to musculoskeletal concerns. Non-orthopaedic concerns were referred back to the primary care physician. PHYSICAL EXAMINATION:   The patient appears their stated age and they are in no distress. The lower extremities are as described below. Circulation is normal with palpable pedal pulses bilaterally and no edema. There is no lymph adenopathy in the popliteal or malleolar region. The skin is without stasis disease distally bilaterally. Hip ROM is smooth and painless. Knee range of motion is 0-120 degrees on the right and 0-120 degrees on the left. bilateral knee: There is 2mm of anterior/posterior translation and 2mm of medial/lateral instability bilaterally. There is 2 degrees of varus alignment in the bilateral knee. There is some pain to palpation over the medial joint line. Limb lengths are equal.  The gait is noted to be with a slight trendelenburg and antalgia. Straight leg test is negative. Quadriceps strength is good. Sensation is intact to light touch bilaterally. Their judgment and insight are normal.  They are oriented to time, place and person. Their memory is good and the mood and affect appropriate. X-RAY: Views of the bilateral knee are reviewed.   4 views standing reveal some joint space loss, eburnated bone, and osteophyte formation present. X-ray impression:  Moderate bilateral  osteoarthritis of the knee    IMPRESSION:    Diagnosis Orders   1. Bilateral primary osteoarthritis of knee  triamcinolone acetonide (KENALOG-40) injection 40 mg    US ARTHR/ASP/INJ MAJOR JNT/BURSA LEFT    US ARTHR/ASP/INJ MAJOR JNT/BURSA RIGHT    triamcinolone acetonide (KENALOG-40) injection 40 mg    XR Knee Bilateral Standard Extended VW      . RECOMMENDATIONS:    Reviewed x-ray findings with the patient. Today we discussed conservative treatments such as NSAIDs and PT. To date these have not been effective for the patient. The concerns with functional limitations are increasing and response is variable with conservative measures. Surgery was discussed today with the patient. They are not limited to warrant any type of surgical consideration. We will additionally try injection therapies as we process management of this progressive and chronic condition. TKA - Today we also discussed knee replacement surgery. They are aware of the 1% risk of infection. They were also informed of the possibility of stroke, heart attack and blood clot; any of which could result in further hospitalization or death. and Procedure Note: The bilateral  knee was prepped with alcohol and injected with 40 mg of Kenalog and 2 mL of 0.5 % marcaine. MacroCure US unit with a variable frequency (6.0-15.0 MHz) linear transducer was used to visualize the retropatellar fat pad, patella, patellar tendon, tibia, and to ensure proper intra-articular placement of medication. Injection image was stored in the patient's permanent chart. The injection was without event and I will follow them as scheduled. Approximately 30 minutes was spent reviewing the medical record, imaging, performing history and physical examination, discussing the diagnosis and treatment plan with the patient, and completing documentation for this visit.

## 2023-05-02 NOTE — PROGRESS NOTES
[facility] CARDIOLOGY  [address], [address], [address] [place] [address]  PHONE: [phone]      23    NAME:  Eugenia Prakash  : 1935  MRN: [id number]         SUBJECTIVE:   Eugenia Prakash is a 82 y.o. male seen for a follow up visit regarding the following:     Chief Complaint   Patient presents with    Hypertension    Hyperlipidemia    Chest Pain            HPI:  Follow up  Hypertension, Hyperlipidemia, and Chest Pain   . Follow up chronic atypical shoulder and chest pain, hypertension, dyslipidemia, DM, CKD. Returns to evaluate chest discomfort . He started seeing a chiropractor a couple of weeks ago and after wards for 2 days his legs were heavy and he had trouble walking, that resolved. But he also reports chest discomfort that doesn't occur while he is walking but starts as soon as he finishes walking 2 miles and will last 30 minutes. Says he has had this for a very long time  He mentions high stress the last few months having lost several family members. On his way to a  today, in fact. Takes nothing for the discomfort. Past cardiac history: Oct 2018 - vasodilator perfusion study normal   May 2020- normal carotid doppler   Mar 2021-24 hr average BP is elevated at 158/69 with a range of 109/35 (1 am) - 203/108 (afternoon). Absence of nocturnal dipping with nighttime average 155/70, daytime average 159/69. There are 2 peaks, one ~ 2 pm and one ~ 11 pm.  Recommend we add  a low dose amlodipine, 5 mg, taking it mid day if possible, for smoother control. Of course, continue regular exercise and low Na diet. Given his CKD would tolerate average in the 140's if we can get it there, avoid pushing too low to spare renal perfusion. Follow up as planned on the .  Thanks             [facility] CAD CHF Meds            amLODIPine (NORVASC) 5 MG tablet (Taking)    Class: Historical Med    carvedilol (COREG) 12.5 MG tablet (Taking)    Class: Historical Med    losartan

## 2023-05-03 ENCOUNTER — OFFICE VISIT (OUTPATIENT)
Dept: CARDIOLOGY CLINIC | Age: 88
End: 2023-05-03
Payer: MEDICARE

## 2023-05-03 VITALS
HEART RATE: 66 BPM | DIASTOLIC BLOOD PRESSURE: 62 MMHG | HEIGHT: 62 IN | BODY MASS INDEX: 23.74 KG/M2 | WEIGHT: 129 LBS | SYSTOLIC BLOOD PRESSURE: 118 MMHG

## 2023-05-03 DIAGNOSIS — N18.31 TYPE 2 DIABETES MELLITUS WITH STAGE 3A CHRONIC KIDNEY DISEASE, WITHOUT LONG-TERM CURRENT USE OF INSULIN (HCC): ICD-10-CM

## 2023-05-03 DIAGNOSIS — R07.89 CHEST DISCOMFORT: Primary | ICD-10-CM

## 2023-05-03 DIAGNOSIS — E78.2 MIXED HYPERLIPIDEMIA: ICD-10-CM

## 2023-05-03 DIAGNOSIS — E11.22 TYPE 2 DIABETES MELLITUS WITH STAGE 3A CHRONIC KIDNEY DISEASE, WITHOUT LONG-TERM CURRENT USE OF INSULIN (HCC): ICD-10-CM

## 2023-05-03 DIAGNOSIS — N18.32 STAGE 3B CHRONIC KIDNEY DISEASE (HCC): ICD-10-CM

## 2023-05-03 DIAGNOSIS — I10 PRIMARY HYPERTENSION: ICD-10-CM

## 2023-05-03 PROCEDURE — 3078F DIAST BP <80 MM HG: CPT | Performed by: INTERNAL MEDICINE

## 2023-05-03 PROCEDURE — G8420 CALC BMI NORM PARAMETERS: HCPCS | Performed by: INTERNAL MEDICINE

## 2023-05-03 PROCEDURE — G8427 DOCREV CUR MEDS BY ELIG CLIN: HCPCS | Performed by: INTERNAL MEDICINE

## 2023-05-03 PROCEDURE — 99214 OFFICE O/P EST MOD 30 MIN: CPT | Performed by: INTERNAL MEDICINE

## 2023-05-03 PROCEDURE — 93000 ELECTROCARDIOGRAM COMPLETE: CPT | Performed by: INTERNAL MEDICINE

## 2023-05-03 PROCEDURE — 1036F TOBACCO NON-USER: CPT | Performed by: INTERNAL MEDICINE

## 2023-05-03 PROCEDURE — 1123F ACP DISCUSS/DSCN MKR DOCD: CPT | Performed by: INTERNAL MEDICINE

## 2023-05-03 PROCEDURE — 3074F SYST BP LT 130 MM HG: CPT | Performed by: INTERNAL MEDICINE

## 2023-05-03 RX ORDER — NITROGLYCERIN 0.4 MG/1
0.4 TABLET SUBLINGUAL EVERY 5 MIN PRN
Qty: 25 TABLET | Refills: 5 | Status: SHIPPED | OUTPATIENT
Start: 2023-05-03

## 2023-05-03 NOTE — PATIENT INSTRUCTIONS
Patient Education        Angina: Care Instructions  Overview     Angina happens when there is not enough blood flow to your heart muscle. Angina is a sign of coronary artery disease. Coronary artery disease happens when fatty deposits called plaque (say \"plak\") build up inside your coronary arteries. This plaque may limit the amount of blood to your heart muscle. Having coronary artery disease also increases your risk of a heart attack. Chest pain or pressure is the most common symptom of angina. But some people have other symptoms, like:  Pain, pressure, or a strange feeling in the back, neck, jaw, or upper belly, or in one or both shoulders or arms. Shortness of breath. Nausea or vomiting. Lightheadedness or sudden weakness. Fast or irregular heartbeat. Most women feel symptoms in their chest. But women are somewhat more likely than men to have other angina symptoms like shortness of breath, tiredness, nausea, and back or jaw pain. Angina can be dangerous. That's why it is important to pay attention to your symptoms. Know what is typical for you, learn how to control your symptoms, and understand when you need to get treatment. A change in your usual pattern of symptoms is an emergency. It may mean that you are having a heart attack. The doctor has checked you carefully, but problems can develop later. If you notice any problems or new symptoms, get medical treatment right away. Follow-up care is a key part of your treatment and safety. Be sure to make and go to all appointments, and call your doctor if you are having problems. It's also a good idea to know your test results and keep a list of the medicines you take. How can you care for yourself at home? Medicines    If your doctor has given you nitroglycerin for angina symptoms, keep it with you at all times. If you have symptoms, sit down and rest, and take the first dose of nitroglycerin as directed.  If your symptoms get worse or are not getting

## 2023-06-28 ENCOUNTER — OFFICE VISIT (OUTPATIENT)
Age: 88
End: 2023-06-28
Payer: MEDICARE

## 2023-06-28 VITALS
WEIGHT: 125 LBS | HEART RATE: 54 BPM | SYSTOLIC BLOOD PRESSURE: 134 MMHG | BODY MASS INDEX: 23 KG/M2 | DIASTOLIC BLOOD PRESSURE: 58 MMHG | HEIGHT: 62 IN

## 2023-06-28 DIAGNOSIS — E78.5 DYSLIPIDEMIA: ICD-10-CM

## 2023-06-28 DIAGNOSIS — E11.9 TYPE 2 DIABETES MELLITUS TREATED WITHOUT INSULIN (HCC): ICD-10-CM

## 2023-06-28 DIAGNOSIS — N18.32 STAGE 3B CHRONIC KIDNEY DISEASE (HCC): ICD-10-CM

## 2023-06-28 DIAGNOSIS — I20.8 EXERTIONAL ANGINA (HCC): Primary | ICD-10-CM

## 2023-06-28 PROCEDURE — G8420 CALC BMI NORM PARAMETERS: HCPCS | Performed by: INTERNAL MEDICINE

## 2023-06-28 PROCEDURE — 1123F ACP DISCUSS/DSCN MKR DOCD: CPT | Performed by: INTERNAL MEDICINE

## 2023-06-28 PROCEDURE — 3078F DIAST BP <80 MM HG: CPT | Performed by: INTERNAL MEDICINE

## 2023-06-28 PROCEDURE — 99214 OFFICE O/P EST MOD 30 MIN: CPT | Performed by: INTERNAL MEDICINE

## 2023-06-28 PROCEDURE — G8427 DOCREV CUR MEDS BY ELIG CLIN: HCPCS | Performed by: INTERNAL MEDICINE

## 2023-06-28 PROCEDURE — 3075F SYST BP GE 130 - 139MM HG: CPT | Performed by: INTERNAL MEDICINE

## 2023-06-28 PROCEDURE — 1036F TOBACCO NON-USER: CPT | Performed by: INTERNAL MEDICINE

## 2023-06-28 RX ORDER — RANOLAZINE 500 MG/1
500 TABLET, EXTENDED RELEASE ORAL 2 TIMES DAILY
Qty: 180 TABLET | Refills: 3 | Status: SHIPPED | OUTPATIENT
Start: 2023-06-28

## 2023-06-28 RX ORDER — ROSUVASTATIN CALCIUM 20 MG/1
20 TABLET, COATED ORAL DAILY
Qty: 90 TABLET | Refills: 3 | Status: SHIPPED | OUTPATIENT
Start: 2023-06-28

## 2023-06-28 ASSESSMENT — ENCOUNTER SYMPTOMS: SHORTNESS OF BREATH: 0

## 2023-07-05 ENCOUNTER — TELEPHONE (OUTPATIENT)
Age: 88
End: 2023-07-05

## 2023-07-05 NOTE — TELEPHONE ENCOUNTER
Please call patient re: he is having problems with ranolazine ER giving him sleepiness and problems with balance. He quit taking please advise.

## 2023-07-05 NOTE — TELEPHONE ENCOUNTER
He reports Ranexa made him feel light headed,off balance,not sleeping well and tick in his heart. He is falling asleep in his chair during the day for short periods. He denies snoring. Has been more fatigued. BP has been around 140/70's. He has decided not to take the medication.

## 2023-07-05 NOTE — TELEPHONE ENCOUNTER
Zev Crane MD  You 56 minutes ago (1:43 PM)     BM  Ok. Noted. thanks    Pt.notified of MD response and v/u.

## 2023-09-19 PROBLEM — I20.89 EXERTIONAL ANGINA: Status: ACTIVE | Noted: 2023-09-19

## 2023-09-19 PROBLEM — I20.8 EXERTIONAL ANGINA (HCC): Status: ACTIVE | Noted: 2023-09-19

## 2023-09-19 NOTE — PROGRESS NOTES
[facility] CARDIOLOGY  [address], [address]  PHONE: [phone]      23    NAME:  Elian Andersen  : 1935  MRN: [id number]         SUBJECTIVE:   Elian Andersen is a 82 y.o. male seen for a follow up visit regarding the following:     Chief Complaint   Patient presents with    Hypertension    Hyperlipidemia    Chest Pain            HPI:  Follow up  Hypertension, Hyperlipidemia, and Chest Pain   . Follow up chronic atypical shoulder and chest pain, hypertension, dyslipidemia, DM, CKD, and medical therapy of stable angina (Ranexa added last visit- he didn't tolerate it). His chest pain is better but its because he hasn't been walking. Hasn't been walking because he's felt to tired/weak to do it. His PCP has been telemonitoring his BP at home ~ 3 hours after meds, remains labile, the lowest reading he recalls is ~ 118/50, office readings have been 120/50 - 189/80       Past cardiac history: Oct 2018 - vasodilator perfusion study normal   May 2020- normal carotid doppler   Mar 2021-24 hr average BP is elevated at 158/69 with a range of 109/35 (1 am) - 203/108 (afternoon). Absence of nocturnal dipping with nighttime average 155/70, daytime average 159/69. There are 2 peaks, one ~ 2 pm and one ~ 11 pm.    2023       NST - reported as normal but on personal review there is reversible inferolateral ischemia with SDS = 2, preserved EF, moderate risk scan                  Key CAD CHF Meds            rosuvastatin (CRESTOR) 20 MG tablet (Taking)    Sig - Route:  Take 1 tablet by mouth daily - Oral    amLODIPine (NORVASC) 5 MG tablet (Taking)    Class: Historical Med    carvedilol (COREG) 12.5 MG tablet (Taking)    Class: Historical Med    losartan (COZAAR) 100 MG tablet (Taking)    Class: Historical Med          Key Antihyperglycemic Medications            glipiZIDE (GLUCOTROL) 5 MG tablet (Taking)    Class: Historical Med                Past Medical History, Past

## 2023-09-20 ENCOUNTER — OFFICE VISIT (OUTPATIENT)
Age: 88
End: 2023-09-20
Payer: MEDICARE

## 2023-09-20 VITALS
SYSTOLIC BLOOD PRESSURE: 136 MMHG | BODY MASS INDEX: 22.15 KG/M2 | HEART RATE: 64 BPM | WEIGHT: 125 LBS | DIASTOLIC BLOOD PRESSURE: 60 MMHG | HEIGHT: 63 IN

## 2023-09-20 DIAGNOSIS — I10 ESSENTIAL HYPERTENSION: ICD-10-CM

## 2023-09-20 DIAGNOSIS — N18.32 STAGE 3B CHRONIC KIDNEY DISEASE (HCC): ICD-10-CM

## 2023-09-20 DIAGNOSIS — I20.8 EXERTIONAL ANGINA (HCC): Primary | ICD-10-CM

## 2023-09-20 PROCEDURE — G8420 CALC BMI NORM PARAMETERS: HCPCS | Performed by: INTERNAL MEDICINE

## 2023-09-20 PROCEDURE — 1123F ACP DISCUSS/DSCN MKR DOCD: CPT | Performed by: INTERNAL MEDICINE

## 2023-09-20 PROCEDURE — 3075F SYST BP GE 130 - 139MM HG: CPT | Performed by: INTERNAL MEDICINE

## 2023-09-20 PROCEDURE — G8427 DOCREV CUR MEDS BY ELIG CLIN: HCPCS | Performed by: INTERNAL MEDICINE

## 2023-09-20 PROCEDURE — 99214 OFFICE O/P EST MOD 30 MIN: CPT | Performed by: INTERNAL MEDICINE

## 2023-09-20 PROCEDURE — 1036F TOBACCO NON-USER: CPT | Performed by: INTERNAL MEDICINE

## 2023-09-20 PROCEDURE — 3078F DIAST BP <80 MM HG: CPT | Performed by: INTERNAL MEDICINE

## 2023-09-20 RX ORDER — ROSUVASTATIN CALCIUM 20 MG/1
20 TABLET, COATED ORAL DAILY
Qty: 90 TABLET | Refills: 3 | Status: SHIPPED | OUTPATIENT
Start: 2023-09-20

## 2023-09-20 RX ORDER — NITROGLYCERIN 0.4 MG/1
0.4 TABLET SUBLINGUAL EVERY 5 MIN PRN
Qty: 25 TABLET | Refills: 5 | Status: SHIPPED | OUTPATIENT
Start: 2023-09-20

## 2023-10-30 ENCOUNTER — APPOINTMENT (OUTPATIENT)
Dept: GENERAL RADIOLOGY | Age: 88
End: 2023-10-30
Payer: MEDICARE

## 2023-10-30 ENCOUNTER — HOSPITAL ENCOUNTER (EMERGENCY)
Age: 88
Discharge: HOME | End: 2023-10-30
Attending: EMERGENCY MEDICINE
Payer: MEDICARE

## 2023-10-30 VITALS
SYSTOLIC BLOOD PRESSURE: 149 MMHG | DIASTOLIC BLOOD PRESSURE: 56 MMHG | OXYGEN SATURATION: 100 % | TEMPERATURE: 97.3 F | HEART RATE: 56 BPM | RESPIRATION RATE: 15 BRPM

## 2023-10-30 DIAGNOSIS — R07.9 CHEST PAIN OF UNCERTAIN ETIOLOGY: Primary | ICD-10-CM

## 2023-10-30 LAB
ALBUMIN SERPL-MCNC: 4 G/DL (ref 3.2–4.6)
ALBUMIN/GLOB SERPL: 1.1 (ref 0.4–1.6)
ALP SERPL-CCNC: 111 U/L (ref 50–136)
ALT SERPL-CCNC: 20 U/L (ref 12–65)
ANION GAP SERPL CALC-SCNC: 7 MMOL/L (ref 2–11)
AST SERPL-CCNC: 25 U/L (ref 15–37)
BASOPHILS # BLD: 0.1 K/UL (ref 0–0.2)
BASOPHILS NFR BLD: 1 % (ref 0–2)
BILIRUB DIRECT SERPL-MCNC: 0.1 MG/DL
BILIRUB SERPL-MCNC: 0.5 MG/DL (ref 0.2–1.1)
BUN SERPL-MCNC: 23 MG/DL (ref 8–23)
CALCIUM SERPL-MCNC: 9.7 MG/DL (ref 8.3–10.4)
CHLORIDE SERPL-SCNC: 110 MMOL/L (ref 101–110)
CO2 SERPL-SCNC: 25 MMOL/L (ref 21–32)
CREAT SERPL-MCNC: 1.6 MG/DL (ref 0.8–1.5)
DIFFERENTIAL METHOD BLD: ABNORMAL
EKG ATRIAL RATE: 62 BPM
EKG DIAGNOSIS: NORMAL
EKG P AXIS: 66 DEGREES
EKG P-R INTERVAL: 166 MS
EKG Q-T INTERVAL: 430 MS
EKG QRS DURATION: 94 MS
EKG QTC CALCULATION (BAZETT): 436 MS
EKG R AXIS: 15 DEGREES
EKG T AXIS: 54 DEGREES
EKG VENTRICULAR RATE: 62 BPM
EOSINOPHIL # BLD: 0.5 K/UL (ref 0–0.8)
EOSINOPHIL NFR BLD: 9 % (ref 0.5–7.8)
ERYTHROCYTE [DISTWIDTH] IN BLOOD BY AUTOMATED COUNT: 13.4 % (ref 11.9–14.6)
GLOBULIN SER CALC-MCNC: 3.6 G/DL (ref 2.8–4.5)
GLUCOSE SERPL-MCNC: 103 MG/DL (ref 65–100)
HCT VFR BLD AUTO: 36.5 % (ref 41.1–50.3)
HGB BLD-MCNC: 12.1 G/DL (ref 13.6–17.2)
IMM GRANULOCYTES # BLD AUTO: 0 K/UL (ref 0–0.5)
IMM GRANULOCYTES NFR BLD AUTO: 0 % (ref 0–5)
LIPASE SERPL-CCNC: 149 U/L (ref 73–393)
LYMPHOCYTES # BLD: 2.4 K/UL (ref 0.5–4.6)
LYMPHOCYTES NFR BLD: 40 % (ref 13–44)
MCH RBC QN AUTO: 30 PG (ref 26.1–32.9)
MCHC RBC AUTO-ENTMCNC: 33.2 G/DL (ref 31.4–35)
MCV RBC AUTO: 90.3 FL (ref 82–102)
MONOCYTES # BLD: 0.6 K/UL (ref 0.1–1.3)
MONOCYTES NFR BLD: 10 % (ref 4–12)
NEUTS SEG # BLD: 2.4 K/UL (ref 1.7–8.2)
NEUTS SEG NFR BLD: 40 % (ref 43–78)
NRBC # BLD: 0 K/UL (ref 0–0.2)
PLATELET # BLD AUTO: 186 K/UL (ref 150–450)
PMV BLD AUTO: 10.3 FL (ref 9.4–12.3)
POTASSIUM SERPL-SCNC: 4.1 MMOL/L (ref 3.5–5.1)
PROT SERPL-MCNC: 7.6 G/DL (ref 6.3–8.2)
RBC # BLD AUTO: 4.04 M/UL (ref 4.23–5.6)
SODIUM SERPL-SCNC: 142 MMOL/L (ref 133–143)
TROPONIN I SERPL HS-MCNC: 4.2 PG/ML (ref 0–14)
TROPONIN I SERPL HS-MCNC: 5 PG/ML (ref 0–14)
WBC # BLD AUTO: 6 K/UL (ref 4.3–11.1)

## 2023-10-30 PROCEDURE — 80076 HEPATIC FUNCTION PANEL: CPT

## 2023-10-30 PROCEDURE — 93010 ELECTROCARDIOGRAM REPORT: CPT | Performed by: INTERNAL MEDICINE

## 2023-10-30 PROCEDURE — 80048 BASIC METABOLIC PNL TOTAL CA: CPT

## 2023-10-30 PROCEDURE — 6370000000 HC RX 637 (ALT 250 FOR IP): Performed by: EMERGENCY MEDICINE

## 2023-10-30 PROCEDURE — 83690 ASSAY OF LIPASE: CPT

## 2023-10-30 PROCEDURE — 6360000002 HC RX W HCPCS: Performed by: EMERGENCY MEDICINE

## 2023-10-30 PROCEDURE — 99285 EMERGENCY DEPT VISIT HI MDM: CPT

## 2023-10-30 PROCEDURE — 85025 COMPLETE CBC W/AUTO DIFF WBC: CPT

## 2023-10-30 PROCEDURE — 96374 THER/PROPH/DIAG INJ IV PUSH: CPT

## 2023-10-30 PROCEDURE — 93005 ELECTROCARDIOGRAM TRACING: CPT | Performed by: EMERGENCY MEDICINE

## 2023-10-30 PROCEDURE — 84484 ASSAY OF TROPONIN QUANT: CPT

## 2023-10-30 PROCEDURE — 71045 X-RAY EXAM CHEST 1 VIEW: CPT

## 2023-10-30 RX ORDER — SUCRALFATE 1 G/1
1 TABLET ORAL 4 TIMES DAILY
Qty: 120 TABLET | Refills: 3 | Status: SHIPPED | OUTPATIENT
Start: 2023-10-30 | End: 2023-10-30 | Stop reason: SDUPTHER

## 2023-10-30 RX ORDER — SUCRALFATE 1 G/1
1 TABLET ORAL 4 TIMES DAILY
Qty: 120 TABLET | Refills: 3 | Status: SHIPPED | OUTPATIENT
Start: 2023-10-30

## 2023-10-30 RX ORDER — MORPHINE SULFATE 4 MG/ML
4 INJECTION INTRAVENOUS ONCE
Status: COMPLETED | OUTPATIENT
Start: 2023-10-30 | End: 2023-10-30

## 2023-10-30 RX ORDER — SUCRALFATE 1 G/1
1 TABLET ORAL
Status: COMPLETED | OUTPATIENT
Start: 2023-10-30 | End: 2023-10-30

## 2023-10-30 RX ORDER — ASPIRIN 81 MG/1
324 TABLET, CHEWABLE ORAL DAILY
Status: DISCONTINUED | OUTPATIENT
Start: 2023-10-30 | End: 2023-10-30 | Stop reason: HOSPADM

## 2023-10-30 RX ADMIN — MORPHINE SULFATE 4 MG: 4 INJECTION INTRAVENOUS at 07:33

## 2023-10-30 RX ADMIN — ASPIRIN 324 MG: 81 TABLET, CHEWABLE ORAL at 07:33

## 2023-10-30 RX ADMIN — SUCRALFATE 1 G: 1 TABLET ORAL at 07:33

## 2023-10-30 ASSESSMENT — PAIN DESCRIPTION - LOCATION
LOCATION: CHEST;ABDOMEN
LOCATION: CHEST

## 2023-10-30 ASSESSMENT — PAIN SCALES - GENERAL
PAINLEVEL_OUTOF10: 5
PAINLEVEL_OUTOF10: 7
PAINLEVEL_OUTOF10: 7

## 2023-10-30 ASSESSMENT — PAIN - FUNCTIONAL ASSESSMENT: PAIN_FUNCTIONAL_ASSESSMENT: 0-10

## 2023-10-30 ASSESSMENT — PAIN DESCRIPTION - PAIN TYPE: TYPE: ACUTE PAIN

## 2023-10-30 ASSESSMENT — LIFESTYLE VARIABLES
HOW OFTEN DO YOU HAVE A DRINK CONTAINING ALCOHOL: NEVER
HOW MANY STANDARD DRINKS CONTAINING ALCOHOL DO YOU HAVE ON A TYPICAL DAY: PATIENT DOES NOT DRINK

## 2023-10-30 NOTE — ED PROVIDER NOTES
plan..      Risk of Complications and/or Morbidity of Patient Management:  Prescription drug management performed. History      82-year-old male with a history of diabetes, chronic kidney disease mild presenting to the emergency department for evaluation of chest pain. Patient states that around 6 PM yesterday evening he began having a dull aching discomfort in his chest and upper abdomen that felt like indigestion. He states it was about a 7/10. He took a nitroglycerin which had been prescribed about 6 months ago but that he had never taken prior, and then went to sleep. He states he woke up this morning with the same discomfort, which he describes again as a dullness in his chest and upper abdomen again feeling like indigestion. The pain does not radiate. He does note some associated shortness of breath. He has no numbness or tingling, no pleuritic discomfort. No unilateral or bilateral swelling in lower extremities. Patient does note a history of carotid artery disease, he also states that he had a stress test about 6 months ago which was normal but that his cardiologist told him that he had \"a mild blockage\". He notes his blood sugars typically pretty well controlled, he checks his blood sugar about once every 3 days and notes it typically ranges between 85 and 105. Review of Systems    Physical Exam     Vitals signs and nursing note reviewed:  Vitals:    10/30/23 0744 10/30/23 0804 10/30/23 0814 10/30/23 0834   BP: (!) 148/51 (!) 146/57 (!) 150/55 (!) 142/55   Pulse: 54 57 54 57   Resp: 20 17 11 16   Temp:       TempSrc:       SpO2: 96% 98% 96% 97%      Physical Exam  Vitals and nursing note reviewed. Constitutional:       General: He is not in acute distress. Appearance: Normal appearance. He is normal weight. He is not ill-appearing or toxic-appearing. HENT:      Head: Normocephalic and atraumatic.       Mouth/Throat:      Mouth: Mucous membranes are moist.   Eyes:

## 2023-10-30 NOTE — ED NOTES
Patient refusing blood work at this time. Wants to be stuck by another RN charge aware.      Alissa Negron RN  10/30/23 [id number]

## 2023-10-30 NOTE — ED TRIAGE NOTES
Presents with chest pain that started around 630-7pm. Went to sleep awoke around 0330, states body felt heavy and still had chest pain.

## 2023-10-30 NOTE — DISCHARGE INSTRUCTIONS
As we discussed, your evaluation here in the emergency department is reassuring. Your heart markers are normal, EKG is normal and chest x-ray looks good. It is possible that your symptoms could be due to irritation of your stomach lining. Take the prescribed medication to see if this helps with your symptoms. However it is also possible that your symptoms could be due to some worsening of your heart disease. This would need to be diagnosed with a stress test or other advanced testing by cardiology. As a result call your cardiologist and set up a follow-up appointment. In the interim if your pain returns or worsens he should return to the ER for further evaluation.

## 2023-12-06 ENCOUNTER — TELEPHONE (OUTPATIENT)
Age: 88
End: 2023-12-06

## 2023-12-06 NOTE — TELEPHONE ENCOUNTER
Tough situation but as we previously discussed kind of running out of options. If he's willing to go ahead and set up procedure, having had one before, would prefer to do that with apologies to my colleague doing the procedure since I will not have had an h and p within 30 days. If he's agreeable, please arrange [facility]\"" PCI with renal protocol, no V-gram, and get CBC and BMP 1-2 days PRIOR to procedure. Please let me know via in basket message when scheduled and with whom so I can let the provider know. Risks include but not limited to bleeding, infection, MI, stroke, limb loss, death and are the same risks he faced last time. If he is more comfortable seeing someone first then please ask a colleague to do so, my note should be very easy to follow so should be a pretty quick visit. Thank you! Spoke to pt. States he would like to see a physician before agreeing to cath. Appt scheduled with Dr. Jonathan Kimbrough on 12/8 at 1:30.

## 2023-12-06 NOTE — TELEPHONE ENCOUNTER
Jose F Rios with cardiac rehab called on behalf of the pt. States pt is still continuing with anginal symptoms, CP, chest pressure, blurred vision and dizziness after 8 visits. Also states BP stable and that pt is open to further cardiac testing. Jose F Rios would appreciate a call back to further discuss.

## 2023-12-08 ENCOUNTER — OFFICE VISIT (OUTPATIENT)
Age: 88
End: 2023-12-08

## 2023-12-08 VITALS
DIASTOLIC BLOOD PRESSURE: 56 MMHG | HEART RATE: 58 BPM | SYSTOLIC BLOOD PRESSURE: 126 MMHG | WEIGHT: 124 LBS | HEIGHT: 63 IN | BODY MASS INDEX: 21.97 KG/M2

## 2023-12-08 DIAGNOSIS — E78.5 DYSLIPIDEMIA: ICD-10-CM

## 2023-12-08 DIAGNOSIS — N18.32 STAGE 3B CHRONIC KIDNEY DISEASE (HCC): ICD-10-CM

## 2023-12-08 DIAGNOSIS — I10 ESSENTIAL HYPERTENSION: ICD-10-CM

## 2023-12-08 DIAGNOSIS — E11.22 TYPE 2 DIABETES MELLITUS WITH STAGE 3A CHRONIC KIDNEY DISEASE, WITHOUT LONG-TERM CURRENT USE OF INSULIN (HCC): ICD-10-CM

## 2023-12-08 DIAGNOSIS — R07.89 CHEST DISCOMFORT: Primary | ICD-10-CM

## 2023-12-08 DIAGNOSIS — N18.31 TYPE 2 DIABETES MELLITUS WITH STAGE 3A CHRONIC KIDNEY DISEASE, WITHOUT LONG-TERM CURRENT USE OF INSULIN (HCC): ICD-10-CM

## 2023-12-08 ASSESSMENT — ENCOUNTER SYMPTOMS
EYE REDNESS: 0
HEMOPTYSIS: 0
STRIDOR: 0
DOUBLE VISION: 0
HOARSE VOICE: 0
HEMATOCHEZIA: 0
ABDOMINAL PAIN: 0
WHEEZING: 0
HEMATEMESIS: 0

## 2023-12-08 NOTE — PROGRESS NOTES
hypertension  Well-controlled on current therapy-continue current antihypertensives    Stage 3b chronic kidney disease ([address])  Being monitored-counseled on importance of remaining well-hydrated    Dyslipidemia    Continue high intensity statin therapy with rosuvastatin 20 mg daily. Overall Impression  -I made no changes with his medical therapy. The chest pain that he is feeling lately is not exertional, less likely to be coronary in etiology. He is already on dual antianginal therapy. At his age with elevated pressures, hyperlipidemia, family history of heart disease and diabetes, he certainly has a high risk for obstructive coronary artery disease but if he is doing well medically, we can certainly continue medical therapy especially with a negative nuclear stress test from July 2023. If he has any unstable symptoms unrelieved with sublingual nitroglycerin he will go to the ER.  -He has plans to undergo an EGD/GI workup in the near future and we have no reasons to hold him back from this. These would be low risk procedures. He would be at acceptable risk to proceed. If his GI workup is negative but he continues to have chest pain, although he is stage IIIb chronic kidney disease, coronary angiography is still an option but not urgent at this point. Reviewed ER records in detail-negative cardiac markers despite chest pain-reassuring     On this date I have spent 40 minutes personally reviewing previous notes/outside records, test results and face to face time with the patient discussing the diagnosis and importance of compliance with the treatment plan as well as documenting on the day of the visit. Elements of this note have been dictated using speech recognition software. As a result, errors of speech recognition may have occurred. Return for cad, htn, hld, chest pain. Thank you for allowing us to participate in the care of your patient.   If you have any further questions, please do not

## 2023-12-14 ENCOUNTER — TELEPHONE (OUTPATIENT)
Age: 88
End: 2023-12-14

## 2023-12-14 NOTE — TELEPHONE ENCOUNTER
Patient having EGD with Dr. Rubin Rodriguez on 02/12/24. Requesting cardiac clearance and Plavix hold for 5 days prior.  Fax:

## 2023-12-15 NOTE — TELEPHONE ENCOUNTER
This is from his visit with Dr Hollingsworth Knife: \"He has plans to undergo an EGD/GI workup in the near future and we have no reasons to hold him back from this. These would be low risk procedures. He would be at acceptable risk to proceed.  \"    I would add low risk to hold plavix 5 days, continue asa

## 2024-01-08 NOTE — PROGRESS NOTES
[facility] CARDIOLOGY  [address] [place] [address], [address]  [place] [address]  PHONE: [phone]      24    NAME:  Wayne Dorsey  : 1935  MRN: [id number]         SUBJECTIVE:   Wayne Dorsey is a 88 y.o. male seen for a follow up visit regarding the following:     Chief Complaint   Patient presents with    Chest Pain            HPI:  Follow up  Chest Pain   .    Follow up chronic atypical shoulder and chest pain, hypertension, dyslipidemia, DM, CKD, and medical therapy of stable angina. Intolerant of Ranexa . Have been reserving [facility] for accelerating symptoms.  We keep receiving information from cardiac rehab that he continues to complain of chest pain.  He saw a colleague while I was away who felt likely GI with recent negative ER evaluation.  Symptoms have not responded to either GI therapy or dual antianginal therapy.   He has had some mild inferior dynamic T wave changes comparing EKG's from October, 23, and today.   He continues to have chest pain mostly at night when he lies down but also at cardiac rehab.  Had it for about 3 hours last night, he took nothing for it, not even nitroglycerine.  Denies shortness of breath.  Has continued to exercise at cardiac rehab though at a slower pace but then says he doesn't have worsening of discomfort if he goes faster.  Very difficult history.                Past cardiac history:   Oct 2018 - vasodilator perfusion study normal   May 2020- normal carotid doppler   Mar 2021-24 hr average BP is elevated at 158/69 with a range of 109/35 (1 am) - 203/108 (afternoon).  Absence of nocturnal dipping with nighttime average 155/70, daytime average 159/69.  There are 2 peaks, one ~ 2 pm and one ~ 11 pm.    2023       NST - reported as normal but on personal review there is reversible inferolateral ischemia with SDS = 2, preserved EF, moderate risk scan                 Key CAD CHF Meds            rosuvastatin (CRESTOR) 20 MG tablet (Taking)    Sig -

## 2024-01-09 ENCOUNTER — OFFICE VISIT (OUTPATIENT)
Age: 89
End: 2024-01-09

## 2024-01-09 VITALS
HEART RATE: 59 BPM | WEIGHT: 126.4 LBS | SYSTOLIC BLOOD PRESSURE: 138 MMHG | BODY MASS INDEX: 22.39 KG/M2 | DIASTOLIC BLOOD PRESSURE: 64 MMHG | HEIGHT: 63 IN

## 2024-01-09 DIAGNOSIS — I20.0 ACCELERATING ANGINA (HCC): ICD-10-CM

## 2024-01-09 DIAGNOSIS — R07.89 CHEST DISCOMFORT: Primary | ICD-10-CM

## 2024-01-09 ASSESSMENT — ENCOUNTER SYMPTOMS: SHORTNESS OF BREATH: 0

## 2024-01-09 NOTE — PATIENT INSTRUCTIONS
Patient Education        Angina: Care Instructions  Overview     Angina happens when there is not enough blood flow to your heart muscle. This low blood flow is often a result of coronary artery disease. Coronary artery disease happens when fatty deposits called plaque (say \"plak\") build up inside your coronary arteries. This plaque may limit the amount of blood to your heart muscle. Having coronary artery disease also increases your risk of a heart attack.  Chest pain or pressure is the most common symptom of angina. But some people have other symptoms, like:  Pain, pressure, or a strange feeling in the back, neck, jaw, or upper belly, or in one or both shoulders or arms.  Shortness of breath.  Nausea or vomiting.  Lightheadedness or sudden weakness.  Fast or irregular heartbeat.  Angina can be dangerous. That's why it is important to pay attention to your symptoms. Know what is typical for you, learn how to control your symptoms, and understand when you need to get treatment.  A change in your usual pattern of symptoms is an emergency. It may mean that you are having a heart attack.\"  If you notice any problems or new symptoms, get medical treatment right away.  Follow-up care is a key part of your treatment and safety. Be sure to make and go to all appointments, and call your doctor if you are having problems. It's also a good idea to know your test results and keep a list of the medicines you take.  How can you care for yourself at home?  Medicines    If your doctor has given you nitroglycerin for angina symptoms, keep it with you at all times. If you have symptoms, sit down and rest, and take the first dose of nitroglycerin as directed. If your symptoms get worse or are not getting better within 5 minutes, call 911 right away. Stay on the phone. The emergency  will give you further instructions.     Be safe with medicines. Take your medicines exactly as prescribed. Call your doctor if you think you are

## 2024-01-10 DIAGNOSIS — I20.0 ACCELERATING ANGINA (HCC): ICD-10-CM

## 2024-01-10 LAB
ANION GAP SERPL CALC-SCNC: 4 MMOL/L (ref 2–11)
BUN SERPL-MCNC: 23 MG/DL (ref 8–23)
CALCIUM SERPL-MCNC: 9.7 MG/DL (ref 8.3–10.4)
CHLORIDE SERPL-SCNC: 102 MMOL/L (ref 103–113)
CO2 SERPL-SCNC: 28 MMOL/L (ref 21–32)
CREAT SERPL-MCNC: 1.8 MG/DL (ref 0.8–1.5)
ERYTHROCYTE [DISTWIDTH] IN BLOOD BY AUTOMATED COUNT: 13.2 % (ref 11.9–14.6)
GLUCOSE SERPL-MCNC: 148 MG/DL (ref 65–100)
HCT VFR BLD AUTO: 34.4 % (ref 41.1–50.3)
HGB BLD-MCNC: 11.5 G/DL (ref 13.6–17.2)
MCH RBC QN AUTO: 29.8 PG (ref 26.1–32.9)
MCHC RBC AUTO-ENTMCNC: 33.4 G/DL (ref 31.4–35)
MCV RBC AUTO: 89.1 FL (ref 82–102)
NRBC # BLD: 0 K/UL (ref 0–0.2)
PLATELET # BLD AUTO: 236 K/UL (ref 150–450)
PMV BLD AUTO: 10.4 FL (ref 9.4–12.3)
POTASSIUM SERPL-SCNC: 4.2 MMOL/L (ref 3.5–5.1)
RBC # BLD AUTO: 3.86 M/UL (ref 4.23–5.6)
SODIUM SERPL-SCNC: 134 MMOL/L (ref 136–146)
WBC # BLD AUTO: 6.1 K/UL (ref 4.3–11.1)

## 2024-01-10 NOTE — PROGRESS NOTES
Patient pre-assessment complete for Wayne Dorsey scheduled for [facility], arrival time 0915. Patient verified using  NPO status reinforced. Patient informed to take a full dose aspirin 325mg  or 81 mg x 4 on the day of procedure. Patient instructed to HOLD glipizide. Instructed they can take all other medications excluding vitamins & supplements. Patient verbalizes understanding of all instructions & denies any questions at this time.

## 2024-01-11 ENCOUNTER — APPOINTMENT (OUTPATIENT)
Dept: NON INVASIVE DIAGNOSTICS | Age: 89
DRG: 218 | End: 2024-01-11
Attending: INTERNAL MEDICINE
Payer: MEDICARE

## 2024-01-11 ENCOUNTER — HOSPITAL ENCOUNTER (INPATIENT)
Age: 89
LOS: 2 days | Discharge: HOME | DRG: 218 | End: 2024-01-13
Attending: INTERNAL MEDICINE | Admitting: INTERNAL MEDICINE
Payer: MEDICARE

## 2024-01-11 DIAGNOSIS — I25.10 CAD (CORONARY ARTERY DISEASE): ICD-10-CM

## 2024-01-11 DIAGNOSIS — I25.110 CORONARY ARTERY DISEASE INVOLVING NATIVE CORONARY ARTERY OF NATIVE HEART WITH UNSTABLE ANGINA PECTORIS (HCC): ICD-10-CM

## 2024-01-11 DIAGNOSIS — I25.10 CAD IN NATIVE ARTERY: Primary | ICD-10-CM

## 2024-01-11 DIAGNOSIS — I20.0 ACCELERATING ANGINA (HCC): ICD-10-CM

## 2024-01-11 LAB
ACT BLD: 277 SECS (ref 70–128)
ANION GAP SERPL CALC-SCNC: 5 MMOL/L (ref 2–11)
BUN SERPL-MCNC: 21 MG/DL (ref 8–23)
CALCIUM SERPL-MCNC: 9.1 MG/DL (ref 8.3–10.4)
CHLORIDE SERPL-SCNC: 109 MMOL/L (ref 103–113)
CO2 SERPL-SCNC: 23 MMOL/L (ref 21–32)
CREAT SERPL-MCNC: 1.8 MG/DL (ref 0.8–1.5)
ECHO AO ASC DIAM: 2.9 CM
ECHO AO ASCENDING AORTA INDEX: 1.82 CM/M2
ECHO AO ROOT DIAM: 2.9 CM
ECHO AO ROOT INDEX: 1.82 CM/M2
ECHO AR MAX VEL PISA: 3.7 M/S
ECHO AV AREA PEAK VELOCITY: 1.2 CM2
ECHO AV AREA VTI: 1.2 CM2
ECHO AV AREA/BSA PEAK VELOCITY: 0.8 CM2/M2
ECHO AV AREA/BSA VTI: 0.8 CM2/M2
ECHO AV MEAN GRADIENT: 7 MMHG
ECHO AV MEAN VELOCITY: 1.3 M/S
ECHO AV PEAK GRADIENT: 12 MMHG
ECHO AV PEAK VELOCITY: 1.8 M/S
ECHO AV REGURGITANT PHT: 559 MS
ECHO AV VELOCITY RATIO: 0.39
ECHO AV VTI: 46.3 CM
ECHO BSA: 1.59 M2
ECHO BSA: 1.59 M2
ECHO EST RA PRESSURE: 3 MMHG
ECHO IVC PROX: 1.1 CM
ECHO LA AREA 2C: 13.7 CM2
ECHO LA AREA 4C: 13.4 CM2
ECHO LA DIAMETER INDEX: 1.64 CM/M2
ECHO LA DIAMETER: 2.6 CM
ECHO LA MAJOR AXIS: 4.6 CM
ECHO LA MINOR AXIS: 4.6 CM
ECHO LA TO AORTIC ROOT RATIO: 0.9
ECHO LA VOL BP: 33 ML (ref 18–58)
ECHO LA VOL MOD A2C: 33 ML (ref 18–58)
ECHO LA VOL MOD A4C: 32 ML (ref 18–58)
ECHO LA VOL/BSA BIPLANE: 21 ML/M2 (ref 16–34)
ECHO LA VOLUME INDEX MOD A2C: 21 ML/M2 (ref 16–34)
ECHO LA VOLUME INDEX MOD A4C: 20 ML/M2 (ref 16–34)
ECHO LV E' LATERAL VELOCITY: 13 CM/S
ECHO LV E' SEPTAL VELOCITY: 7 CM/S
ECHO LV EDV A2C: 77 ML
ECHO LV EDV A4C: 75 ML
ECHO LV EDV INDEX A4C: 47 ML/M2
ECHO LV EDV NDEX A2C: 48 ML/M2
ECHO LV EJECTION FRACTION A2C: 64 %
ECHO LV EJECTION FRACTION A4C: 65 %
ECHO LV EJECTION FRACTION BIPLANE: 66 % (ref 55–100)
ECHO LV ESV A2C: 28 ML
ECHO LV ESV A4C: 26 ML
ECHO LV ESV INDEX A2C: 18 ML/M2
ECHO LV ESV INDEX A4C: 16 ML/M2
ECHO LV FRACTIONAL SHORTENING: 43 % (ref 28–44)
ECHO LV INTERNAL DIMENSION DIASTOLE INDEX: 2.2 CM/M2
ECHO LV INTERNAL DIMENSION DIASTOLIC: 3.5 CM (ref 4.2–5.9)
ECHO LV INTERNAL DIMENSION SYSTOLIC INDEX: 1.26 CM/M2
ECHO LV INTERNAL DIMENSION SYSTOLIC: 2 CM
ECHO LV IVSD: 1.1 CM (ref 0.6–1)
ECHO LV MASS 2D: 127.3 G (ref 88–224)
ECHO LV MASS INDEX 2D: 80 G/M2 (ref 49–115)
ECHO LV POSTERIOR WALL DIASTOLIC: 1.2 CM (ref 0.6–1)
ECHO LV RELATIVE WALL THICKNESS RATIO: 0.69
ECHO LVOT AREA: 2.8 CM2
ECHO LVOT AV VTI INDEX: 0.42
ECHO LVOT DIAM: 1.9 CM
ECHO LVOT MEAN GRADIENT: 1 MMHG
ECHO LVOT PEAK GRADIENT: 2 MMHG
ECHO LVOT PEAK VELOCITY: 0.7 M/S
ECHO LVOT STROKE VOLUME INDEX: 34.8 ML/M2
ECHO LVOT SV: 55.3 ML
ECHO LVOT VTI: 19.5 CM
ECHO MV A VELOCITY: 1.24 M/S
ECHO MV E DECELERATION TIME (DT): 251 MS
ECHO MV E VELOCITY: 0.87 M/S
ECHO MV E/A RATIO: 0.7
ECHO MV E/E' LATERAL: 6.69
ECHO MV E/E' RATIO (AVERAGED): 9.56
ECHO PV ACCELERATION TIME (AT): 121 MS
ECHO PV MAX VELOCITY: 0.8 M/S
ECHO PV PEAK GRADIENT: 2 MMHG
ECHO RIGHT VENTRICULAR SYSTOLIC PRESSURE (RVSP): 41 MMHG
ECHO RV BASAL DIMENSION: 3.5 CM
ECHO RV FREE WALL PEAK S': 9 CM/S
ECHO RV TAPSE: 1.9 CM (ref 1.7–?)
ECHO TV REGURGITANT MAX VELOCITY: 3.09 M/S
ECHO TV REGURGITANT PEAK GRADIENT: 38 MMHG
EKG ATRIAL RATE: 55 BPM
EKG DIAGNOSIS: NORMAL
EKG P AXIS: 62 DEGREES
EKG P-R INTERVAL: 174 MS
EKG Q-T INTERVAL: 430 MS
EKG QRS DURATION: 92 MS
EKG QTC CALCULATION (BAZETT): 411 MS
EKG R AXIS: 36 DEGREES
EKG T AXIS: 52 DEGREES
EKG VENTRICULAR RATE: 55 BPM
GLUCOSE BLD STRIP.AUTO-MCNC: 210 MG/DL (ref 65–100)
GLUCOSE SERPL-MCNC: 183 MG/DL (ref 65–100)
POTASSIUM SERPL-SCNC: 4.3 MMOL/L (ref 3.5–5.1)
SERVICE CMNT-IMP: ABNORMAL
SODIUM SERPL-SCNC: 137 MMOL/L (ref 136–146)

## 2024-01-11 PROCEDURE — 93005 ELECTROCARDIOGRAM TRACING: CPT | Performed by: INTERNAL MEDICINE

## 2024-01-11 PROCEDURE — 6360000002 HC RX W HCPCS: Performed by: INTERNAL MEDICINE

## 2024-01-11 PROCEDURE — 6370000000 HC RX 637 (ALT 250 FOR IP): Performed by: NURSE PRACTITIONER

## 2024-01-11 PROCEDURE — 2500000003 HC RX 250 WO HCPCS: Performed by: INTERNAL MEDICINE

## 2024-01-11 PROCEDURE — 4A023N7 MEASUREMENT OF CARDIAC SAMPLING AND PRESSURE, LEFT HEART, PERCUTANEOUS APPROACH: ICD-10-PCS | Performed by: INTERNAL MEDICINE

## 2024-01-11 PROCEDURE — 2140000000 HC CCU INTERMEDIATE R&B

## 2024-01-11 PROCEDURE — C1753 CATH, INTRAVAS ULTRASOUND: HCPCS | Performed by: INTERNAL MEDICINE

## 2024-01-11 PROCEDURE — 93458 L HRT ARTERY/VENTRICLE ANGIO: CPT | Performed by: INTERNAL MEDICINE

## 2024-01-11 PROCEDURE — 93306 TTE W/DOPPLER COMPLETE: CPT

## 2024-01-11 PROCEDURE — 2580000003 HC RX 258: Performed by: INTERNAL MEDICINE

## 2024-01-11 PROCEDURE — 75630 X-RAY AORTA LEG ARTERIES: CPT | Performed by: INTERNAL MEDICINE

## 2024-01-11 PROCEDURE — B41D1ZZ FLUOROSCOPY OF AORTA AND BILATERAL LOWER EXTREMITY ARTERIES USING LOW OSMOLAR CONTRAST: ICD-10-PCS | Performed by: INTERNAL MEDICINE

## 2024-01-11 PROCEDURE — 6370000000 HC RX 637 (ALT 250 FOR IP): Performed by: INTERNAL MEDICINE

## 2024-01-11 PROCEDURE — 2709999900 HC NON-CHARGEABLE SUPPLY: Performed by: INTERNAL MEDICINE

## 2024-01-11 PROCEDURE — C1894 INTRO/SHEATH, NON-LASER: HCPCS | Performed by: INTERNAL MEDICINE

## 2024-01-11 PROCEDURE — C1769 GUIDE WIRE: HCPCS | Performed by: INTERNAL MEDICINE

## 2024-01-11 PROCEDURE — 99152 MOD SED SAME PHYS/QHP 5/>YRS: CPT | Performed by: INTERNAL MEDICINE

## 2024-01-11 PROCEDURE — 85347 COAGULATION TIME ACTIVATED: CPT

## 2024-01-11 PROCEDURE — 99153 MOD SED SAME PHYS/QHP EA: CPT | Performed by: INTERNAL MEDICINE

## 2024-01-11 PROCEDURE — 80048 BASIC METABOLIC PNL TOTAL CA: CPT

## 2024-01-11 PROCEDURE — B2111ZZ FLUOROSCOPY OF MULTIPLE CORONARY ARTERIES USING LOW OSMOLAR CONTRAST: ICD-10-PCS | Performed by: INTERNAL MEDICINE

## 2024-01-11 PROCEDURE — C1887 CATHETER, GUIDING: HCPCS | Performed by: INTERNAL MEDICINE

## 2024-01-11 PROCEDURE — 93010 ELECTROCARDIOGRAM REPORT: CPT | Performed by: INTERNAL MEDICINE

## 2024-01-11 PROCEDURE — 6360000004 HC RX CONTRAST MEDICATION: Performed by: INTERNAL MEDICINE

## 2024-01-11 PROCEDURE — 82962 GLUCOSE BLOOD TEST: CPT

## 2024-01-11 RX ORDER — HEPARIN SODIUM 200 [USP'U]/100ML
INJECTION, SOLUTION INTRAVENOUS CONTINUOUS PRN
Status: COMPLETED | OUTPATIENT
Start: 2024-01-11 | End: 2024-01-11

## 2024-01-11 RX ORDER — PANTOPRAZOLE SODIUM 40 MG/1
40 TABLET, DELAYED RELEASE ORAL DAILY
Status: DISCONTINUED | OUTPATIENT
Start: 2024-01-12 | End: 2024-01-11

## 2024-01-11 RX ORDER — ASPIRIN 81 MG/1
324 TABLET, CHEWABLE ORAL ONCE
Status: DISCONTINUED | OUTPATIENT
Start: 2024-01-11 | End: 2024-01-11 | Stop reason: HOSPADM

## 2024-01-11 RX ORDER — SODIUM CHLORIDE 0.9 % (FLUSH) 0.9 %
5-40 SYRINGE (ML) INJECTION EVERY 12 HOURS SCHEDULED
Status: DISCONTINUED | OUTPATIENT
Start: 2024-01-11 | End: 2024-01-13 | Stop reason: HOSPADM

## 2024-01-11 RX ORDER — SODIUM CHLORIDE 9 MG/ML
INJECTION, SOLUTION INTRAVENOUS CONTINUOUS
Status: DISCONTINUED | OUTPATIENT
Start: 2024-01-11 | End: 2024-01-13 | Stop reason: HOSPADM

## 2024-01-11 RX ORDER — AMLODIPINE BESYLATE 5 MG/1
5 TABLET ORAL DAILY
Status: DISCONTINUED | OUTPATIENT
Start: 2024-01-11 | End: 2024-01-13 | Stop reason: HOSPADM

## 2024-01-11 RX ORDER — MIDAZOLAM HYDROCHLORIDE 1 MG/ML
INJECTION INTRAMUSCULAR; INTRAVENOUS PRN
Status: DISCONTINUED | OUTPATIENT
Start: 2024-01-11 | End: 2024-01-11 | Stop reason: HOSPADM

## 2024-01-11 RX ORDER — HEPARIN SODIUM 10000 [USP'U]/ML
INJECTION, SOLUTION INTRAVENOUS; SUBCUTANEOUS PRN
Status: DISCONTINUED | OUTPATIENT
Start: 2024-01-11 | End: 2024-01-11 | Stop reason: HOSPADM

## 2024-01-11 RX ORDER — PANTOPRAZOLE SODIUM 40 MG/1
40 TABLET, DELAYED RELEASE ORAL NIGHTLY
Status: DISCONTINUED | OUTPATIENT
Start: 2024-01-11 | End: 2024-01-13 | Stop reason: HOSPADM

## 2024-01-11 RX ORDER — TRAZODONE HYDROCHLORIDE 50 MG/1
50 TABLET ORAL NIGHTLY
Status: DISCONTINUED | OUTPATIENT
Start: 2024-01-11 | End: 2024-01-13 | Stop reason: HOSPADM

## 2024-01-11 RX ORDER — 0.9 % SODIUM CHLORIDE 0.9 %
250 INTRAVENOUS SOLUTION INTRAVENOUS ONCE
Status: COMPLETED | OUTPATIENT
Start: 2024-01-11 | End: 2024-01-11

## 2024-01-11 RX ORDER — TRAZODONE HYDROCHLORIDE 50 MG/1
50 TABLET ORAL NIGHTLY PRN
Status: DISCONTINUED | OUTPATIENT
Start: 2024-01-11 | End: 2024-01-13 | Stop reason: HOSPADM

## 2024-01-11 RX ORDER — ACETAMINOPHEN 325 MG/1
650 TABLET ORAL EVERY 4 HOURS PRN
Status: DISCONTINUED | OUTPATIENT
Start: 2024-01-11 | End: 2024-01-12 | Stop reason: SDUPTHER

## 2024-01-11 RX ORDER — SODIUM CHLORIDE 0.9 % (FLUSH) 0.9 %
5-40 SYRINGE (ML) INJECTION PRN
Status: DISCONTINUED | OUTPATIENT
Start: 2024-01-11 | End: 2024-01-13 | Stop reason: HOSPADM

## 2024-01-11 RX ORDER — CLOPIDOGREL BISULFATE 75 MG/1
75 TABLET ORAL DAILY
Status: DISCONTINUED | OUTPATIENT
Start: 2024-01-12 | End: 2024-01-13 | Stop reason: HOSPADM

## 2024-01-11 RX ORDER — SUCRALFATE 1 G/1
1 TABLET ORAL
Status: DISCONTINUED | OUTPATIENT
Start: 2024-01-11 | End: 2024-01-13 | Stop reason: HOSPADM

## 2024-01-11 RX ORDER — CARVEDILOL 12.5 MG/1
12.5 TABLET ORAL 2 TIMES DAILY WITH MEALS
Status: DISCONTINUED | OUTPATIENT
Start: 2024-01-11 | End: 2024-01-13 | Stop reason: HOSPADM

## 2024-01-11 RX ORDER — SODIUM CHLORIDE 9 MG/ML
INJECTION, SOLUTION INTRAVENOUS CONTINUOUS
Status: ACTIVE | OUTPATIENT
Start: 2024-01-11 | End: 2024-01-12

## 2024-01-11 RX ORDER — LIDOCAINE HYDROCHLORIDE 10 MG/ML
INJECTION, SOLUTION INFILTRATION; PERINEURAL PRN
Status: DISCONTINUED | OUTPATIENT
Start: 2024-01-11 | End: 2024-01-11 | Stop reason: HOSPADM

## 2024-01-11 RX ORDER — SODIUM CHLORIDE 9 MG/ML
INJECTION, SOLUTION INTRAVENOUS PRN
Status: DISCONTINUED | OUTPATIENT
Start: 2024-01-11 | End: 2024-01-13 | Stop reason: HOSPADM

## 2024-01-11 RX ORDER — ROSUVASTATIN CALCIUM 20 MG/1
20 TABLET, COATED ORAL NIGHTLY
Status: DISCONTINUED | OUTPATIENT
Start: 2024-01-11 | End: 2024-01-13 | Stop reason: HOSPADM

## 2024-01-11 RX ORDER — LOSARTAN POTASSIUM 50 MG/1
100 TABLET ORAL DAILY
Status: DISCONTINUED | OUTPATIENT
Start: 2024-01-12 | End: 2024-01-13 | Stop reason: HOSPADM

## 2024-01-11 RX ADMIN — CARVEDILOL 12.5 MG: 12.5 TABLET, FILM COATED ORAL at 20:07

## 2024-01-11 RX ADMIN — SUCRALFATE 1 G: 1 TABLET ORAL at 21:55

## 2024-01-11 RX ADMIN — SODIUM CHLORIDE, PRESERVATIVE FREE 10 ML: 5 INJECTION INTRAVENOUS at 20:10

## 2024-01-11 RX ADMIN — SODIUM CHLORIDE: 9 INJECTION, SOLUTION INTRAVENOUS at 21:57

## 2024-01-11 RX ADMIN — TRAZODONE HYDROCHLORIDE 50 MG: 50 TABLET ORAL at 21:55

## 2024-01-11 RX ADMIN — SODIUM CHLORIDE: 9 INJECTION, SOLUTION INTRAVENOUS at 17:06

## 2024-01-11 RX ADMIN — PANTOPRAZOLE SODIUM 40 MG: 40 TABLET, DELAYED RELEASE ORAL at 21:55

## 2024-01-11 RX ADMIN — SODIUM CHLORIDE 250 ML: 9 INJECTION, SOLUTION INTRAVENOUS at 09:07

## 2024-01-11 RX ADMIN — ROSUVASTATIN CALCIUM 20 MG: 10 TABLET, FILM COATED ORAL at 20:07

## 2024-01-11 ASSESSMENT — PAIN SCALES - GENERAL: PAINLEVEL_OUTOF10: 0

## 2024-01-11 NOTE — PROGRESS NOTES
Report received from [facility] RN. Procedural findings communicated. Intra procedural  medication administration reviewed. Progression of care discussed.     Patient received into CPRU [place] 3 post sheath removal.     Access site without bleeding or swelling yes    Dressing dry and intact yes    Patient instructed to limit movement to right upper extremity    Routine post procedural vital signs and site assessment initiated yes

## 2024-01-11 NOTE — PROGRESS NOTES
TR band removed from right wrist with no bleeding or swelling noted to site. 4x4 gauze and tegaderm applied to site.

## 2024-01-11 NOTE — PROGRESS NOTES
[facility] RR with Dr Steinberg.  Multivessel disease.  Planned admit and hydration for PCI.    Heparin 5000u  Versed 1mg  Fentanyl 25mcg  TR @ 12    Report to receiving RN.  Pt transferred to CPRU.

## 2024-01-11 NOTE — PROGRESS NOTES
Patient received to CPRU room # 10  Ambulatory from [facility]. Patient scheduled for [facility] today with Dr Steinberg. Procedure reviewed & questions answered, voiced good understanding consent obtained & placed on chart. All medications and medical history reviewed. Will prep patient per orders. Patient & family updated on plan of care.      The patient has a fraility score of 3-MANAGING WELL, based on patient A&Ox3, patient able to ambulate to room without difficulty.    Patient took aspirin 324mg at 0700 prior to arrival.   Patient took plavix 75mg at 0700 prior to arrival.

## 2024-01-12 LAB
ACT BLD: 309 SECS (ref 70–128)
ANION GAP SERPL CALC-SCNC: 6 MMOL/L (ref 2–11)
BUN SERPL-MCNC: 19 MG/DL (ref 8–23)
CALCIUM SERPL-MCNC: 8.5 MG/DL (ref 8.3–10.4)
CHLORIDE SERPL-SCNC: 111 MMOL/L (ref 103–113)
CO2 SERPL-SCNC: 23 MMOL/L (ref 21–32)
CREAT SERPL-MCNC: 1.5 MG/DL (ref 0.8–1.5)
ECHO BSA: 1.59 M2
EKG ATRIAL RATE: 52 BPM
EKG DIAGNOSIS: NORMAL
EKG P AXIS: 65 DEGREES
EKG P-R INTERVAL: 172 MS
EKG Q-T INTERVAL: 470 MS
EKG QRS DURATION: 116 MS
EKG QTC CALCULATION (BAZETT): 437 MS
EKG R AXIS: -10 DEGREES
EKG T AXIS: 60 DEGREES
EKG VENTRICULAR RATE: 52 BPM
ERYTHROCYTE [DISTWIDTH] IN BLOOD BY AUTOMATED COUNT: 12.9 % (ref 11.9–14.6)
GLUCOSE SERPL-MCNC: 138 MG/DL (ref 65–100)
HCT VFR BLD AUTO: 30.1 % (ref 41.1–50.3)
HGB BLD-MCNC: 10.3 G/DL (ref 13.6–17.2)
MAGNESIUM SERPL-MCNC: 2.1 MG/DL (ref 1.8–2.4)
MCH RBC QN AUTO: 30.1 PG (ref 26.1–32.9)
MCHC RBC AUTO-ENTMCNC: 34.2 G/DL (ref 31.4–35)
MCV RBC AUTO: 88 FL (ref 82–102)
NRBC # BLD: 0 K/UL (ref 0–0.2)
PLATELET # BLD AUTO: 210 K/UL (ref 150–450)
PMV BLD AUTO: 10.1 FL (ref 9.4–12.3)
POTASSIUM SERPL-SCNC: 3.9 MMOL/L (ref 3.5–5.1)
RBC # BLD AUTO: 3.42 M/UL (ref 4.23–5.6)
SODIUM SERPL-SCNC: 140 MMOL/L (ref 136–146)
WBC # BLD AUTO: 5.4 K/UL (ref 4.3–11.1)

## 2024-01-12 PROCEDURE — 92928 PRQ TCAT PLMT NTRAC ST 1 LES: CPT | Performed by: INTERNAL MEDICINE

## 2024-01-12 PROCEDURE — 36415 COLL VENOUS BLD VENIPUNCTURE: CPT

## 2024-01-12 PROCEDURE — 92978 ENDOLUMINL IVUS OCT C 1ST: CPT | Performed by: INTERNAL MEDICINE

## 2024-01-12 PROCEDURE — 02HA3RJ INSERTION OF SHORT-TERM EXTERNAL HEART ASSIST SYSTEM INTO HEART, INTRAOPERATIVE, PERCUTANEOUS APPROACH: ICD-10-PCS | Performed by: INTERNAL MEDICINE

## 2024-01-12 PROCEDURE — 6360000002 HC RX W HCPCS: Performed by: INTERNAL MEDICINE

## 2024-01-12 PROCEDURE — 99152 MOD SED SAME PHYS/QHP 5/>YRS: CPT | Performed by: INTERNAL MEDICINE

## 2024-01-12 PROCEDURE — 5A0221D ASSISTANCE WITH CARDIAC OUTPUT USING IMPELLER PUMP, CONTINUOUS: ICD-10-PCS | Performed by: INTERNAL MEDICINE

## 2024-01-12 PROCEDURE — 6360000004 HC RX CONTRAST MEDICATION: Performed by: INTERNAL MEDICINE

## 2024-01-12 PROCEDURE — 33990 INSJ PERQ VAD L HRT ARTERIAL: CPT | Performed by: INTERNAL MEDICINE

## 2024-01-12 PROCEDURE — 2580000003 HC RX 258: Performed by: INTERNAL MEDICINE

## 2024-01-12 PROCEDURE — C1753 CATH, INTRAVAS ULTRASOUND: HCPCS | Performed by: INTERNAL MEDICINE

## 2024-01-12 PROCEDURE — C1725 CATH, TRANSLUMIN NON-LASER: HCPCS | Performed by: INTERNAL MEDICINE

## 2024-01-12 PROCEDURE — 6370000000 HC RX 637 (ALT 250 FOR IP): Performed by: NURSE PRACTITIONER

## 2024-01-12 PROCEDURE — 2140000000 HC CCU INTERMEDIATE R&B

## 2024-01-12 PROCEDURE — 93005 ELECTROCARDIOGRAM TRACING: CPT | Performed by: INTERNAL MEDICINE

## 2024-01-12 PROCEDURE — C1769 GUIDE WIRE: HCPCS | Performed by: INTERNAL MEDICINE

## 2024-01-12 PROCEDURE — 92979 ENDOLUMINL IVUS OCT C EA: CPT | Performed by: INTERNAL MEDICINE

## 2024-01-12 PROCEDURE — 80048 BASIC METABOLIC PNL TOTAL CA: CPT

## 2024-01-12 PROCEDURE — C1760 CLOSURE DEV, VASC: HCPCS | Performed by: INTERNAL MEDICINE

## 2024-01-12 PROCEDURE — C9600 PERC DRUG-EL COR STENT SING: HCPCS | Performed by: INTERNAL MEDICINE

## 2024-01-12 PROCEDURE — 85347 COAGULATION TIME ACTIVATED: CPT | Performed by: INTERNAL MEDICINE

## 2024-01-12 PROCEDURE — 85027 COMPLETE CBC AUTOMATED: CPT

## 2024-01-12 PROCEDURE — 2709999900 HC NON-CHARGEABLE SUPPLY: Performed by: INTERNAL MEDICINE

## 2024-01-12 PROCEDURE — C1887 CATHETER, GUIDING: HCPCS | Performed by: INTERNAL MEDICINE

## 2024-01-12 PROCEDURE — 99153 MOD SED SAME PHYS/QHP EA: CPT | Performed by: INTERNAL MEDICINE

## 2024-01-12 PROCEDURE — 027237Z DILATION OF CORONARY ARTERY, THREE ARTERIES WITH FOUR OR MORE DRUG-ELUTING INTRALUMINAL DEVICES, PERCUTANEOUS APPROACH: ICD-10-PCS | Performed by: INTERNAL MEDICINE

## 2024-01-12 PROCEDURE — C1894 INTRO/SHEATH, NON-LASER: HCPCS | Performed by: INTERNAL MEDICINE

## 2024-01-12 PROCEDURE — 6370000000 HC RX 637 (ALT 250 FOR IP): Performed by: INTERNAL MEDICINE

## 2024-01-12 PROCEDURE — C1874 STENT, COATED/COV W/DEL SYS: HCPCS | Performed by: INTERNAL MEDICINE

## 2024-01-12 PROCEDURE — C1889 IMPLANT/INSERT DEVICE, NOC: HCPCS | Performed by: INTERNAL MEDICINE

## 2024-01-12 PROCEDURE — 2500000003 HC RX 250 WO HCPCS: Performed by: INTERNAL MEDICINE

## 2024-01-12 PROCEDURE — 93010 ELECTROCARDIOGRAM REPORT: CPT | Performed by: INTERNAL MEDICINE

## 2024-01-12 PROCEDURE — 85347 COAGULATION TIME ACTIVATED: CPT

## 2024-01-12 PROCEDURE — 83735 ASSAY OF MAGNESIUM: CPT

## 2024-01-12 DEVICE — STENT ONYXNG30012UX ONYX 3.00X12RX
Type: IMPLANTABLE DEVICE | Site: HEART | Status: FUNCTIONAL
Brand: ONYX FRONTIER™

## 2024-01-12 DEVICE — KIT CATHETER PUMP INSERTION INTRACARDIAC IMPELLA CP: Type: IMPLANTABLE DEVICE | Status: FUNCTIONAL

## 2024-01-12 DEVICE — STENT ONYXNG35012UX ONYX 3.50X12RX
Type: IMPLANTABLE DEVICE | Site: HEART | Status: FUNCTIONAL
Brand: ONYX FRONTIER™

## 2024-01-12 DEVICE — STENT ONYXNG30018UX ONYX 3.00X18RX
Type: IMPLANTABLE DEVICE | Site: HEART | Status: FUNCTIONAL
Brand: ONYX FRONTIER™

## 2024-01-12 RX ORDER — HYDRALAZINE HYDROCHLORIDE 20 MG/ML
10 INJECTION INTRAMUSCULAR; INTRAVENOUS EVERY 10 MIN PRN
Status: DISCONTINUED | OUTPATIENT
Start: 2024-01-12 | End: 2024-01-13 | Stop reason: HOSPADM

## 2024-01-12 RX ORDER — ASPIRIN 81 MG/1
324 TABLET, CHEWABLE ORAL
Status: COMPLETED | OUTPATIENT
Start: 2024-01-12 | End: 2024-01-12

## 2024-01-12 RX ORDER — MAGNESIUM HYDROXIDE/ALUMINUM HYDROXICE/SIMETHICONE 120; 1200; 1200 MG/30ML; MG/30ML; MG/30ML
SUSPENSION ORAL PRN
Status: DISCONTINUED | OUTPATIENT
Start: 2024-01-12 | End: 2024-01-12 | Stop reason: HOSPADM

## 2024-01-12 RX ORDER — TRAMADOL HYDROCHLORIDE 50 MG/1
50 TABLET ORAL EVERY 6 HOURS PRN
Status: DISCONTINUED | OUTPATIENT
Start: 2024-01-12 | End: 2024-01-13 | Stop reason: HOSPADM

## 2024-01-12 RX ORDER — BIVALIRUDIN 250 MG/5ML
INJECTION, POWDER, LYOPHILIZED, FOR SOLUTION INTRAVENOUS PRN
Status: DISCONTINUED | OUTPATIENT
Start: 2024-01-12 | End: 2024-01-12 | Stop reason: HOSPADM

## 2024-01-12 RX ORDER — 0.9 % SODIUM CHLORIDE 0.9 %
500 INTRAVENOUS SOLUTION INTRAVENOUS PRN
Status: DISCONTINUED | OUTPATIENT
Start: 2024-01-12 | End: 2024-01-13 | Stop reason: HOSPADM

## 2024-01-12 RX ORDER — HEPARIN SODIUM 200 [USP'U]/100ML
INJECTION, SOLUTION INTRAVENOUS CONTINUOUS PRN
Status: COMPLETED | OUTPATIENT
Start: 2024-01-12 | End: 2024-01-12

## 2024-01-12 RX ORDER — SODIUM CHLORIDE 0.9 % (FLUSH) 0.9 %
5-40 SYRINGE (ML) INJECTION PRN
Status: DISCONTINUED | OUTPATIENT
Start: 2024-01-12 | End: 2024-01-13 | Stop reason: HOSPADM

## 2024-01-12 RX ORDER — ATROPINE SULFATE 0.4 MG/ML
0.5 INJECTION, SOLUTION INTRAVENOUS
Status: DISCONTINUED | OUTPATIENT
Start: 2024-01-12 | End: 2024-01-13 | Stop reason: HOSPADM

## 2024-01-12 RX ORDER — ACETAMINOPHEN 325 MG/1
650 TABLET ORAL EVERY 4 HOURS PRN
Status: DISCONTINUED | OUTPATIENT
Start: 2024-01-12 | End: 2024-01-13 | Stop reason: HOSPADM

## 2024-01-12 RX ORDER — LIDOCAINE HYDROCHLORIDE 10 MG/ML
INJECTION, SOLUTION INFILTRATION; PERINEURAL PRN
Status: DISCONTINUED | OUTPATIENT
Start: 2024-01-12 | End: 2024-01-12 | Stop reason: HOSPADM

## 2024-01-12 RX ORDER — MIDAZOLAM HYDROCHLORIDE 1 MG/ML
INJECTION INTRAMUSCULAR; INTRAVENOUS PRN
Status: DISCONTINUED | OUTPATIENT
Start: 2024-01-12 | End: 2024-01-12 | Stop reason: HOSPADM

## 2024-01-12 RX ORDER — MORPHINE SULFATE 10 MG/ML
2 INJECTION, SOLUTION INTRAMUSCULAR; INTRAVENOUS
Status: DISCONTINUED | OUTPATIENT
Start: 2024-01-12 | End: 2024-01-13 | Stop reason: HOSPADM

## 2024-01-12 RX ORDER — SODIUM CHLORIDE 0.9 % (FLUSH) 0.9 %
5-40 SYRINGE (ML) INJECTION EVERY 12 HOURS SCHEDULED
Status: DISCONTINUED | OUTPATIENT
Start: 2024-01-12 | End: 2024-01-13 | Stop reason: HOSPADM

## 2024-01-12 RX ORDER — SODIUM CHLORIDE 9 MG/ML
INJECTION, SOLUTION INTRAVENOUS PRN
Status: DISCONTINUED | OUTPATIENT
Start: 2024-01-12 | End: 2024-01-13 | Stop reason: HOSPADM

## 2024-01-12 RX ORDER — CLOPIDOGREL BISULFATE 75 MG/1
TABLET ORAL PRN
Status: DISCONTINUED | OUTPATIENT
Start: 2024-01-12 | End: 2024-01-12 | Stop reason: HOSPADM

## 2024-01-12 RX ADMIN — SUCRALFATE 1 G: 1 TABLET ORAL at 08:18

## 2024-01-12 RX ADMIN — AMLODIPINE BESYLATE 5 MG: 5 TABLET ORAL at 08:18

## 2024-01-12 RX ADMIN — CLOPIDOGREL BISULFATE 75 MG: 75 TABLET ORAL at 08:18

## 2024-01-12 RX ADMIN — SUCRALFATE 1 G: 1 TABLET ORAL at 17:38

## 2024-01-12 RX ADMIN — TRAZODONE HYDROCHLORIDE 50 MG: 50 TABLET ORAL at 20:22

## 2024-01-12 RX ADMIN — CARVEDILOL 12.5 MG: 12.5 TABLET, FILM COATED ORAL at 20:21

## 2024-01-12 RX ADMIN — SODIUM CHLORIDE, PRESERVATIVE FREE 10 ML: 5 INJECTION INTRAVENOUS at 20:23

## 2024-01-12 RX ADMIN — CARVEDILOL 12.5 MG: 12.5 TABLET, FILM COATED ORAL at 08:18

## 2024-01-12 RX ADMIN — ROSUVASTATIN CALCIUM 20 MG: 10 TABLET, FILM COATED ORAL at 20:23

## 2024-01-12 RX ADMIN — SODIUM CHLORIDE, PRESERVATIVE FREE 10 ML: 5 INJECTION INTRAVENOUS at 08:16

## 2024-01-12 RX ADMIN — LOSARTAN POTASSIUM 100 MG: 50 TABLET, FILM COATED ORAL at 08:18

## 2024-01-12 RX ADMIN — SUCRALFATE 1 G: 1 TABLET ORAL at 20:21

## 2024-01-12 RX ADMIN — ASPIRIN 81 MG 324 MG: 81 TABLET ORAL at 08:19

## 2024-01-12 RX ADMIN — PANTOPRAZOLE SODIUM 40 MG: 40 TABLET, DELAYED RELEASE ORAL at 20:22

## 2024-01-12 ASSESSMENT — PAIN - FUNCTIONAL ASSESSMENT: PAIN_FUNCTIONAL_ASSESSMENT: NONE - DENIES PAIN

## 2024-01-12 ASSESSMENT — PAIN SCALES - GENERAL
PAINLEVEL_OUTOF10: 0
PAINLEVEL_OUTOF10: 0

## 2024-01-12 NOTE — PROGRESS NOTES
Impella assisted PCI RtFm with Dr Steinberg.  MANDY x 2 to Cx and MANDY x 2 to LAD.  RtFm closed with Perclose and Angioseal.    Versed 5mg  Fentanyl 100mcg  Angiomax 40.6mg/gtt @ 11ml/hr completed in CCL  Plavix 300mg  Maalox 30ml    Report to receiving RN.  Pt transferred to CPRU.

## 2024-01-12 NOTE — PLAN OF CARE
Problem: Chronic Conditions and Co-morbidities  Goal: Patient's chronic conditions and co-morbidity symptoms are monitored and maintained or improved  Outcome: Progressing  Flowsheets (Taken 1/11/2024 2007)  Care Plan - Patient's Chronic Conditions and Co-Morbidity Symptoms are Monitored and Maintained or Improved:   Monitor and assess patient's chronic conditions and comorbid symptoms for stability, deterioration, or improvement   Collaborate with multidisciplinary team to address chronic and comorbid conditions and prevent exacerbation or deterioration   Update acute care plan with appropriate goals if chronic or comorbid symptoms are exacerbated and prevent overall improvement and discharge     Problem: Discharge Planning  Goal: Discharge to home or other facility with appropriate resources  Outcome: Progressing  Flowsheets (Taken 1/11/2024 2007)  Discharge to home or other facility with appropriate resources:   Identify barriers to discharge with patient and caregiver   Arrange for needed discharge resources and transportation as appropriate   Identify discharge learning needs (meds, wound care, etc)     Problem: Safety - Adult  Goal: Free from fall injury  Outcome: Progressing  Flowsheets (Taken 1/11/2024 2007)  Free From Fall Injury: Instruct family/caregiver on patient safety

## 2024-01-12 NOTE — PROGRESS NOTES
TRANSFER - IN REPORT:    Verbal report received from Toby ram Wayne Dorsey  being received from [facility] for routine progression of patient care      Report consisted of patient's Situation, Background, Assessment and   Recommendations(SBAR).     Information from the following report(s) Nurse Handoff Report, MAR, and Cardiac Rhythm Sinus Bakari  was reviewed with the receiving nurse.    Opportunity for questions and clarification was provided.      Assessment completed upon patient's arrival to unit and care assumed.  Right groin site with dressing C/D/I.  No bleeding or swelling.

## 2024-01-12 NOTE — PROGRESS NOTES
[facility] CARDIOLOGY PROGRESS NOTE           1/12/2024 9:07 AM    Admit Date: 1/11/2024         Subjective: Admitted for IV hydration creatinine is better.  Planning on complex Impella supported PCI today    ROS:  Cardiovascular:  As noted above    Objective:      Vitals:    01/12/24 0438 01/12/24 0615 01/12/24 0736 01/12/24 0758   BP: (!) 135/90  (!) 140/55    Pulse: 64  58 62   Resp: 16  17    Temp: 98.6 °F (37 °C)  98.2 °F (36.8 °C)    TempSrc: Oral  Oral    SpO2: 95%  98%    Weight:  54.1 kg (119 lb 4.8 oz)     Height:                 Physical Exam:  General: Well Developed, Well Nourished, No Acute Distress, Alert & Oriented x 3, Appropriate mood  Neck: supple, no JVD  Heart: S1S2 with RRR without murmurs or gallops  Lungs: Clear throughout auscultation bilaterally without adventitious sounds  Abd: soft, nontender, nondistended, with good bowel sounds  Ext: no edema bilaterally  Skin: warm and dry      Data Review:   Recent Labs     01/10/24  1250 01/11/24  0856 01/12/24  0504   * 137 140   K 4.2 4.3 3.9   MG  --   --  2.1   BUN 23 21 19   WBC 6.1  --  5.4   HGB 11.5*  --  10.3*   HCT 34.4*  --  30.1*     --  210       No results for input(s): \"TNIPOC\" in the last 72 hours.    Invalid input(s): \"TROIQ\"    Assessment/Plan:     Principal Problem:    CAD in native artery  Resolved Problems:    * No resolved hospital problems. *    Impella supported PCI to left main and circumflex.  Possible rotational atherectomy.    Discussed risk of cardiac catheterization with patient in detail.  The risk of a major complication (death, MI or major embolization) during or after cardiac catheterization is below 1%.  Risk of mortality is under 0.1%.  Local complications at the site of catheter insertion were discussed.  This includes but not limited to:  acute thrombosis, distal embolization, dissection, poorly controlled bleeding, hematoma, retroperitoneal hemorrhage, pseudoaneurysm or AV

## 2024-01-12 NOTE — PROGRESS NOTES
TRANSFER - OUT REPORT:    Verbal report given to Harish Dorsey  being transferred to [facility] for routine progression of patient care       Report consisted of patient's Situation, Background, Assessment and   Recommendations(SBAR).     Information from the following report(s) Nurse Handoff Report and Cardiac Rhythm NSR/SB  was reviewed with the receiving nurse.           Lines:   Peripheral IV 01/11/24 Distal;Left Forearm (Active)   Site Assessment Clean, dry & intact 01/12/24 0758   Line Status Infusing 01/12/24 0758   Line Care Connections checked and tightened 01/12/24 0758   Phlebitis Assessment No symptoms 01/12/24 0758   Infiltration Assessment 0 01/12/24 0758   Alcohol Cap Used Yes 01/12/24 0758   Dressing Status Clean, dry & intact 01/12/24 0758   Dressing Type Transparent 01/12/24 0758       Peripheral IV 01/11/24 Posterior;Right Hand (Active)   Site Assessment Clean, dry & intact 01/12/24 0758   Line Status Flushed;Normal saline locked;Capped 01/12/24 0758   Line Care Connections checked and tightened;Cap changed 01/12/24 0758   Phlebitis Assessment No symptoms 01/12/24 0758   Infiltration Assessment 0 01/12/24 0758   Alcohol Cap Used Yes 01/12/24 0758   Dressing Status Clean, dry & intact 01/12/24 0758   Dressing Type Transparent 01/12/24 0758        Opportunity for questions and clarification was provided.      Patient transported with:  Monitor and Registered Nurse

## 2024-01-13 VITALS
WEIGHT: 119.3 LBS | BODY MASS INDEX: 21.14 KG/M2 | OXYGEN SATURATION: 99 % | HEIGHT: 63 IN | HEART RATE: 64 BPM | TEMPERATURE: 98.8 F | SYSTOLIC BLOOD PRESSURE: 130 MMHG | DIASTOLIC BLOOD PRESSURE: 59 MMHG | RESPIRATION RATE: 18 BRPM

## 2024-01-13 LAB
ANION GAP SERPL CALC-SCNC: 3 MMOL/L (ref 2–11)
BUN SERPL-MCNC: 22 MG/DL (ref 8–23)
CALCIUM SERPL-MCNC: 8.4 MG/DL (ref 8.3–10.4)
CHLORIDE SERPL-SCNC: 110 MMOL/L (ref 103–113)
CO2 SERPL-SCNC: 25 MMOL/L (ref 21–32)
CREAT SERPL-MCNC: 1.6 MG/DL (ref 0.8–1.5)
ERYTHROCYTE [DISTWIDTH] IN BLOOD BY AUTOMATED COUNT: 12.9 % (ref 11.9–14.6)
GLUCOSE SERPL-MCNC: 191 MG/DL (ref 65–100)
HCT VFR BLD AUTO: 28.8 % (ref 41.1–50.3)
HGB BLD-MCNC: 10.1 G/DL (ref 13.6–17.2)
MCH RBC QN AUTO: 30.1 PG (ref 26.1–32.9)
MCHC RBC AUTO-ENTMCNC: 35.1 G/DL (ref 31.4–35)
MCV RBC AUTO: 85.7 FL (ref 82–102)
NRBC # BLD: 0 K/UL (ref 0–0.2)
PLATELET # BLD AUTO: 187 K/UL (ref 150–450)
PMV BLD AUTO: 9.4 FL (ref 9.4–12.3)
POTASSIUM SERPL-SCNC: 3.7 MMOL/L (ref 3.5–5.1)
RBC # BLD AUTO: 3.36 M/UL (ref 4.23–5.6)
SODIUM SERPL-SCNC: 138 MMOL/L (ref 136–146)
WBC # BLD AUTO: 6.3 K/UL (ref 4.3–11.1)

## 2024-01-13 PROCEDURE — 6370000000 HC RX 637 (ALT 250 FOR IP): Performed by: NURSE PRACTITIONER

## 2024-01-13 PROCEDURE — 36415 COLL VENOUS BLD VENIPUNCTURE: CPT

## 2024-01-13 PROCEDURE — 85027 COMPLETE CBC AUTOMATED: CPT

## 2024-01-13 PROCEDURE — 6370000000 HC RX 637 (ALT 250 FOR IP): Performed by: INTERNAL MEDICINE

## 2024-01-13 PROCEDURE — 80048 BASIC METABOLIC PNL TOTAL CA: CPT

## 2024-01-13 PROCEDURE — 99238 HOSP IP/OBS DSCHRG MGMT 30/<: CPT | Performed by: INTERNAL MEDICINE

## 2024-01-13 RX ORDER — ASPIRIN 81 MG/1
81 TABLET ORAL DAILY
Qty: 30 TABLET | Refills: 3 | Status: SHIPPED | OUTPATIENT
Start: 2024-01-13

## 2024-01-13 RX ORDER — FAMOTIDINE 40 MG/1
40 TABLET, FILM COATED ORAL EVERY EVENING
Qty: 30 TABLET | Refills: 3 | Status: SHIPPED | OUTPATIENT
Start: 2024-01-13

## 2024-01-13 RX ADMIN — LOSARTAN POTASSIUM 100 MG: 50 TABLET, FILM COATED ORAL at 09:25

## 2024-01-13 RX ADMIN — CLOPIDOGREL BISULFATE 75 MG: 75 TABLET ORAL at 09:25

## 2024-01-13 RX ADMIN — CARVEDILOL 12.5 MG: 12.5 TABLET, FILM COATED ORAL at 09:25

## 2024-01-13 RX ADMIN — SUCRALFATE 1 G: 1 TABLET ORAL at 05:31

## 2024-01-13 RX ADMIN — AMLODIPINE BESYLATE 5 MG: 5 TABLET ORAL at 09:25

## 2024-01-13 NOTE — PROGRESS NOTES
Discharge instructions were reviewed with patient. An opportunity was given for questions. All medications were reviewed, and information was given on the new medications. Patient verbalized understanding, and has no questions at this time.

## 2024-01-13 NOTE — DISCHARGE INSTRUCTIONS
You will follow up with [facility] Dr. Woody on 2/19/24 @ 11:00AM in the [place] office and is to continue cardiac rehab at [facility], but is to rest for 1 week before restarting. Additionally, you need to stop taking protonix, since you are on Plavix. Instead, Pepcid (famotidine) has been sent to your pharmacy to replacement protonix. Continue taking your aspirin and plavix every day!    The office has been notified that you need a [facility] office has been informed you need a sooner appointment in 7-10 days post-discharge. You will be called  with the follow up appointment. If you have NOT heard from our office after the next business day, please contact our office for appointment at [phone].     DISPOSITION: The patient is being discharged home in stable condition on a low saturated fat, low cholesterol and low salt diet. The patient is instructed to advance activities as tolerated to the limit of fatigue or shortness of breath. The patient is instructed to avoid all heavy lifting, straining, stooping or squatting for 3-5 days. The patient is instructed to watch the cath site for bleeding/oozing; if seen, the patient is instructed to apply firm pressure with a clean cloth and call [facility] at [phone]. The patient is instructed to watch for signs of infection which include: increasing area of redness, fever/hot to touch or purulent drainage at the catheterization site. The patient is instructed not to soak in a bathtub for 7-10 days, but is cleared to shower. The patient is instructed to call the office or return to the ER for immediate evaluation for any shortness of breath or chest pain not relieved by NTG.       famotidine (oral/injection)  Pronunciation:  fam OH ti stephanie  Brand:  Heartburn Relief, Leader Acid Reducer, Pepcid, Pepcid AC, Pepcid AC Maximum Strength, Zantac 360  What is the most important information I should know about famotidine?  Follow all directions on the label and  reactions, or adverse effects. If you have questions about the drugs you are taking, check with your doctor, nurse or pharmacist.  Copyright 5431-9615 [employer] Version: 18.01. Revision date: 6/10/2021.  Care instructions adapted under license by [employer]). If you have questions about a medical condition or this instruction, always ask your healthcare professional. [employer], [facility] disclaims any warranty or liability for your use of this information.       Taking Aspirin and Other Antiplatelets Safely: Care Instructions  Overview     Aspirin and other antiplatelet medicines help prevent blood clots from forming. They can help some people lower their risk of a heart attack or stroke.  But these medicines can also make you more likely to bleed. That's why it's important to talk to your doctor before you start taking aspirin every day. It's not right for everyone. And if you and your doctor decide these medicines are right for you, learn how to take them safely.  If you take aspirin, be sure you know how to take it. Your doctor can tell you what dose to take and how often to take it. If you take another antiplatelet, take it as prescribed.  Follow-up care is a key part of your treatment and safety. Be sure to make and go to all appointments, and call your doctor if you are having problems. It's also a good idea to know your test results and keep a list of the medicines you take.  How can you care for yourself at home?  Before you start to take daily aspirin or some other antiplatelet, tell your doctor all the medicines, vitamins, herbal products, and supplements you take.  Tell your doctors, dentist, and pharmacist that you take an antiplatelet.  Take your medicine as your doctor directs. Make sure that you understand exactly what your doctor wants you to do. If another doctor says to stop taking the medicine for any reason, talk to the doctor who prescribed it before you stop.  Take your medicine

## 2024-01-13 NOTE — CARE COORDINATION
Pt is for discharge home today with family and no needs/supportive care orders recieved for CM at this time.  Pt is to follow up with  [facility].         01/13/24 6770   Service Assessment   Patient Orientation Alert and Oriented   Cognition Alert   History Provided By Medical Record;Other (see comment);Patient  (brother and sister)   Primary Caregiver Family   Accompanied By/Relationship siblings   Support Systems Family Members   Patient's Healthcare Decision Maker is: Legal Next of Kin   PCP Verified by CM Yes   [facility]   Prior Functional Level Independent in ADLs/IADLs   Current Functional Level Independent in ADLs/IADLs   Can patient return to prior living arrangement Yes   Ability to make needs known: Good   Family able to assist with home care needs: Yes   Would you like for me to discuss the discharge plan with any other family members/significant others, and if so, who? No   Financial Resources Medicare   Community Resources None   CM/SW Referral Other (see comment)  (none)   Social/Functional History   Lives With Alone   Type of Home House   Discharge Planning   Type of Residence House   Living Arrangements Alone   Current Services Prior To Admission None   Potential Assistance Needed N/A   DME Ordered? No   Potential Assistance Purchasing Medications No   Type of Home Care Services None   Patient expects to be discharged to: House   Services At/After Discharge   Transition of Care Consult (CM Consult) Discharge Planning   Services At/After Discharge Outpatient  ([facility])    Resource Information Provided? No  (Pt is aware of benefits)   Mode of Transport at Discharge Other (see comment)  (family)   Confirm Follow Up Transport Family   Condition of Participation: Discharge Planning   The Plan for Transition of Care is related to the following treatment goals: Pt will return home with supportive siblings.   The  Spoke with patient's group home care giver Chata.    Situation: Chata is requesting an order to cut patient's tube fdgs from qid to bid.   Chata states patient is gaining weight and is getting more difficult to turn and transfer patient.    Patient received her first COVID 19 vaccine yesterday.    Patient is weighed every time she goes to the wound clinic.   Chata thinks she is weighed with a sonny lift.     PAST WEIGHTS FROM WOUND CLINIC    02/03/2021 wt. 149.16 lbs    01/11/2021  Wt. 149 lbbs/ 8 oz    12/21/2020   Wt  174 lbs/ 6.4 oz    12/03/2020   In hospital 156 lbs/ 8.4 oz    10/08/2020   Wt 127 lbs/ 10.3 oz    Patient is going to the wound clinic tomorrow, 02/24.    Dr. Koenig do you want to decrease her tube fdgs?

## 2024-01-13 NOTE — DISCHARGE SUMMARY
[facility] CARDIOLOGY PROGRESS NOTE           1/13/2024 8:37 AM    Admit Date: 1/11/2024      Subjective:   Feels good.Soreness noted at cath site.Wants to go home.    ROS:  GEN:  No fever or chills  Cardiovascular:  As noted above:no CP or palpitations.  Pulmonary:  As noted above:no SOB.  Neuro:  No new focal motor or sensory loss    Objective:      Vitals:    01/12/24 2014 01/13/24 0008 01/13/24 0354 01/13/24 0733   BP: (!) 143/64 (!) 151/56 (!) 120/51 (!) 130/59   Pulse: 64 63 61 64   Resp: 16 14 16    Temp: 98.1 °F (36.7 °C) 98.4 °F (36.9 °C) 98.6 °F (37 °C) 98.8 °F (37.1 °C)   TempSrc: Oral Oral Oral Oral   SpO2: 99% 97% 97% 99%   Weight:       Height:           Physical Exam:  General-NAD  Neck- supple, no JVD  CV- regular rate and rhythm no MRG  Lung- clear bilaterally  Abd- soft, nontender, nondistended  Ext- no edema bilaterallly.Right femoral cath site appears normal with no hematoma or bruit.  Skin- warm and dry  Psychiatric:  Normal mood and affect.  Neurologic:  Alert and oriented X 3      Data Review:   Recent Labs     01/12/24  0504 01/13/24  0311    138   K 3.9 3.7   MG 2.1  --    BUN 19 22   WBC 5.4 6.3   HGB 10.3* 10.1*   HCT 30.1* 28.8*    187       TELEMETRY:  NSR    Assessment/Plan:     Principal Problem:    CAD in native artery:Stable s/p complex multi vessel PCI with Impella support.Continue DAPT,Crestor,Losartan,and Coreg.Discharge to home with outpatient follow up with Dr Woody in 1-2 weeks.Resume [facility] cardiac rehab in 1 week.    Primary hypertension:Stable.Continue Losartan,Coreg,and Norvasc.  CRF:Stable.Continue to encourage po fluids.        MIKE GONZALEZ MD  1/13/2024 8:37 AM

## 2024-01-13 NOTE — PLAN OF CARE
Problem: Chronic Conditions and Co-morbidities  Goal: Patient's chronic conditions and co-morbidity symptoms are monitored and maintained or improved  Outcome: Completed     Problem: Discharge Planning  Goal: Discharge to home or other facility with appropriate resources  Outcome: Completed     Problem: ABCDS Injury Assessment  Goal: Absence of physical injury  Outcome: Completed     Problem: Safety - Adult  Goal: Free from fall injury  Outcome: Completed

## 2024-01-13 NOTE — DISCHARGE SUMMARY
[facility] Discharge Summary     Patient ID:  Wayne Dorsey  [id number]  88 y.o.  1935    Admit date: 1/11/2024    Discharge date:  1/13/2024    Admitting Physician: Calvin Steinberg MD     Discharge Physician: Lula Belcher PA-C/ Dr. 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 Woody for complaints of accelerating angina.    Pt was subsequently scheduled for a LHC at [facility] on 1/11/23. Patient underwent cardiac catheterization via the RRA by Dr. Steinberg. Patient was found to have a MVD and was admitted for hydration and planned PCI with Impella support.     LHC 1/11/24  Catheter dampening when the 5 Greek EBU 3 was engaging the left main.  Significant ostial circumflex disease  Ostial right coronary disease  We will plan on admission for hydration.  Planned femoral left main and ostial circumflex percutaneous intervention with Impella support tomorrow.  Aortogram with bilateral iliac and femoral runoff was performed in order to ensure there is adequate room for Impella placement.    ECHO showed:     Left Ventricle: Normal left ventricular systolic function with a visually estimated EF of 60 - 65%. Left ventricle size is normal. Mildly increased wall thickness. Normal wall motion. Normal diastolic function.    Aortic Valve: Mild sclerosis of the aortic valve cusp. Mild regurgitation with a centrally directed jet.    Mitral Valve: Mild regurgitation.    Tricuspid Valve: Mild  Apply to eye 2 times daily      carvedilol (COREG) 12.5 MG tablet Take 1 tablet by mouth 2 times daily (with meals)      vitamin D (CHOLECALCIFEROL) 25 MCG (1000 UT) TABS tablet Take 1 tablet by mouth daily      clopidogrel (PLAVIX) 75 MG tablet Take 1 tablet by mouth daily      glipiZIDE (GLUCOTROL) 5 MG tablet Take 0.5 tablets by mouth daily      losartan (COZAAR) 100 MG tablet Take 1 tablet by mouth daily      traZODone (DESYREL) 50 MG tablet TAKE 1-2 TABLETS BY MOUTH EVERY NIGHT AT BEDTIME AS NEEDED FOR SLEEP           STOP taking these medications       pantoprazole (PROTONIX) 40 MG tablet Comments:   Reason for Stopping:

## 2024-01-15 ENCOUNTER — TELEPHONE (OUTPATIENT)
Age: 89
End: 2024-01-15

## 2024-01-15 NOTE — TELEPHONE ENCOUNTER
Care Transitions Initial Follow Up Call    Call within 2 business days of discharge: Yes     Patient: Wayne Dorsey Patient : 1935 MRN: [id number]    [unfilled]    RARS: Readmission Risk Score: 14.7       Spoke with: pt    Discharge department/facility: [facility]  Adm 24  Discharge 23  Heart Cath w.PCI/stenting    Non-face-to-face services provided:  Pt reports doing pretty good.  Denies CP or SOB. States soreness at cath site is better. Denies bleeding, oozing, drainage, increased redness, hot to touch at cath site. Taking medications as prescribed.   Following a low salt, low cholesterol, low sat fat diet.   Reports dizziness better but did have some lightheadedness when he got up this AM.  Asked pt to checked BP while on phone BP-127/53 HR-68.  Reminded pt of F/U appt.  w/Omari. Asked pt to call our office if needed or continues to have lightheadedness.  Pt v/u.  Follow Up  Future Appointments   Date Time Provider Department Center   2024  8:45 AM Jess Boggs MD [facility] AM[facility]   2024 11:00 AM Jess Boggs MD [facility] AMB Marilynn Y Patterson, LPN

## 2024-01-16 ENCOUNTER — TELEPHONE (OUTPATIENT)
Dept: CARDIAC REHAB | Age: 89
End: 2024-01-16

## 2024-01-16 ENCOUNTER — TELEPHONE (OUTPATIENT)
Age: 89
End: 2024-01-16

## 2024-01-16 NOTE — TELEPHONE ENCOUNTER
Spoke to pt. Recommend he contact PCP for an alternative to pepcid. Instructed to make sure he tells them he is on plavix so he is prescribed something that does not interact with it. Verb understanding.

## 2024-01-16 NOTE — TELEPHONE ENCOUNTER
Pt called regarding Cardiac Rehab. Pt states he will be resuming Cardiac Rehab at the [facility] on Monday, 1/22/24.

## 2024-01-16 NOTE — TELEPHONE ENCOUNTER
Patient was prescribed Pepcid and he said it is keeping his head foggy and it is not settling well with him.

## 2024-01-18 NOTE — PROGRESS NOTES
[facility] CARDIOLOGY  [address] [place] [address], [address]  [place] [address]  PHONE: [phone]      24    NAME:  Wayne Dorsey  : 1935  MRN: [id number]         SUBJECTIVE:   Wayne Dorsey is a 88 y.o. male seen for a follow up visit regarding the following:     Chief Complaint   Patient presents with    Hypertension    Hyperlipidemia    Follow-up            HPI:  Follow up  Hypertension, Hyperlipidemia, and Follow-up   .    Follow up chronic atypical shoulder and chest pain, hypertension, dyslipidemia, DM, CKD. Finally underwent cardiac cath with diffuse severe disease and now post complex PCI.  (EGD will either need to be performed ON DAPT or delayed for several months).  His chest pain is much better.  He has been light headed.  BP today is low.  He's only checked a couple of times at home, sees about 120.  He plans to resume cardiac rehab on Monday. Will be seeing nephrologist soon, Cr actually improved slightly after hydration for cath.           === 24 ===    CARDIAC PROCEDURE 2024  3:28 PM (Final)    - Conclusion -  Successful PCI to the ostial circumflex with 3.0 x 18 [place] drug-eluting stent postdilated proximally to 3.5 mm  Successful PCI to the ostium of the left anterior descending artery with a 3.0 x 12 [place] drug-eluting stent.  Likely left main dissection during the process of percutaneous intervention that was treated with a 3.5 x 12 stent from the left main into the LAD.  And then using the culotte technique we also placed a 3.5 x 12 stent from the left main into the circumflex.  Kissing balloons were used for both of these.  At the end there was an excellent Angiographic result.  All 3 vessels that were stented were evaluated with IVUS.    Hemostasis was achieved at the Impella access site with 1 Angio-Seal device and 1 Perclose.    Calvin Steinberg MD    Signed by: Calvin Steinberg MD on 2024  3:28 PM                 Past cardiac history:   Oct 2018 - vasodilator

## 2024-01-19 ENCOUNTER — OFFICE VISIT (OUTPATIENT)
Age: 89
End: 2024-01-19
Payer: MEDICARE

## 2024-01-19 VITALS
BODY MASS INDEX: 21.79 KG/M2 | HEART RATE: 66 BPM | HEIGHT: 63 IN | WEIGHT: 123 LBS | SYSTOLIC BLOOD PRESSURE: 100 MMHG | DIASTOLIC BLOOD PRESSURE: 58 MMHG

## 2024-01-19 DIAGNOSIS — I10 ESSENTIAL HYPERTENSION: ICD-10-CM

## 2024-01-19 DIAGNOSIS — N18.32 STAGE 3B CHRONIC KIDNEY DISEASE (HCC): ICD-10-CM

## 2024-01-19 DIAGNOSIS — I25.118 CORONARY ARTERY DISEASE INVOLVING NATIVE CORONARY ARTERY OF NATIVE HEART WITH REFRACTORY ANGINA PECTORIS (HCC): Primary | ICD-10-CM

## 2024-01-19 PROCEDURE — G8484 FLU IMMUNIZE NO ADMIN: HCPCS | Performed by: INTERNAL MEDICINE

## 2024-01-19 PROCEDURE — G8420 CALC BMI NORM PARAMETERS: HCPCS | Performed by: INTERNAL MEDICINE

## 2024-01-19 PROCEDURE — 1111F DSCHRG MED/CURRENT MED MERGE: CPT | Performed by: INTERNAL MEDICINE

## 2024-01-19 PROCEDURE — G8427 DOCREV CUR MEDS BY ELIG CLIN: HCPCS | Performed by: INTERNAL MEDICINE

## 2024-01-19 PROCEDURE — 1123F ACP DISCUSS/DSCN MKR DOCD: CPT | Performed by: INTERNAL MEDICINE

## 2024-01-19 PROCEDURE — 1036F TOBACCO NON-USER: CPT | Performed by: INTERNAL MEDICINE

## 2024-01-19 PROCEDURE — 99214 OFFICE O/P EST MOD 30 MIN: CPT | Performed by: INTERNAL MEDICINE

## 2024-01-19 RX ORDER — PANTOPRAZOLE SODIUM 40 MG/1
40 FOR SUSPENSION ORAL
COMMUNITY

## 2024-01-19 RX ORDER — LOSARTAN POTASSIUM 100 MG/1
50 TABLET ORAL DAILY
Qty: 30 TABLET | COMMUNITY
Start: 2024-01-19

## 2024-01-19 NOTE — PATIENT INSTRUCTIONS
Patient Education        Learning About Coronary Artery Disease (CAD)  What is coronary artery disease?     Coronary artery disease is a condition that occurs when plaque builds up in the arteries that bring oxygen-rich blood to your heart. Plaque is a fatty substance made of cholesterol, calcium, and other substances in the blood. This process is called hardening of the arteries, or atherosclerosis.  What happens when you have coronary artery disease?  Plaque may narrow the coronary arteries. Narrowed arteries cause poor blood flow. This can lead to angina symptoms such as chest pain or discomfort. If blood flow is completely blocked, you could have a heart attack.  You can slow and reduce the risk of future problems by making changes in your lifestyle. These include quitting smoking and eating heart-healthy foods.  Treatment, along with changes in your lifestyle, can help you live a longer and healthier life.  How can you prevent coronary artery disease?  Do not smoke. It may be the best thing you can do to prevent coronary artery disease. If you need help quitting, talk to your doctor about stop-smoking programs and medicines. These can increase your chances of quitting for good.  Be active. Try to do moderate activity at least 2½ hours a week. Or try to do vigorous activity at least 1¼ hours a week. You may want to walk or try other activities, such as running, swimming, cycling, or playing tennis or team sports.  Eat heart-healthy foods. Eat more fruits and vegetables and less food that contains saturated and trans fats. Limit alcohol, sodium, and sweets.  Stay at a healthy weight. Lose weight if you need to.  Manage other health problems such as diabetes, high blood pressure, and high cholesterol.  How is coronary artery disease treated?  Your doctor will suggest that you make lifestyle changes. For example, your doctor may ask you to eat healthy foods, quit smoking, lose extra weight, and be more active.  You

## 2024-01-22 NOTE — PROGRESS NOTES
Physician Progress Note      PATIENT:               MARIA DEL CARMEN WEINBERG  CSN #:                  [id number]  :                       1935  ADMIT DATE:       2024 8:43 AM  DISCH DATE:        2024 10:45 AM  RESPONDING  PROVIDER #:        Calvin Steinberg MD          QUERY TEXT:    Patient was noted to undergo a coronary stenting procedure. Operative note   stated, \"Likely left main dissection during the process of percutaneous    intervention that was treated with a 3.5 x 12 stent from the left main  into   the LAD. And then using the culotte technique we also placed a 3.5 x  12 stent   from the left main into the circumflex. Kissing balloons were used for both   of these.\" After study can the dissection be further specified as:    The medical record reflects the following:  Risk Factors: CAD  Clinical Indicators: Per cath report, Likely left main dissection during the   process of percutaneous intervention that was treated with a 3.5 x 12 stent   from the left main into the LAD.  And then using the culotte technique we also   placed a 3.5 x 12 stent from the left main into the circumflex.  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    Query created by: DENNIS ADAIR on 2024 10:29 AM      Electronically signed by:  Calvin Steinberg MD 2024 6:49 AM

## 2024-02-06 ENCOUNTER — TELEPHONE (OUTPATIENT)
Age: 89
End: 2024-02-06

## 2024-02-06 NOTE — TELEPHONE ENCOUNTER
Pt recently had a cath and is going to have an endoscopy soon and wants to make sure this is ok with Dr. Woody.

## 2024-02-06 NOTE — TELEPHONE ENCOUNTER
This is in my last note from last month:    EGD will either need to be performed ON DAPT or delayed for several months (note forwarded to Dr Daren Hanks).     Called and spoke with patient and informed him that Dr. Woody had sent a note to Dr. Hanks informing him patient would need to remain on DAPT for procedure or procedure should be postponed. Stated they had asked him to hold Plavix for 5 days. Informed him that Dr. Woody wanted him to continue taking Plavix, Dr. Hanks would have to use his discretion about the procedure. Voiced understanding.

## 2024-02-06 NOTE — TELEPHONE ENCOUNTER
He absolutely CANNOT hold plavix.  He just had a highly complex PCI procedure.  Please contact Dr Hanks's office, ask them to read the note that was sent last visit, maybe he never got it, but to hold his plavix right now would be highly dangerous.  thanks       Called and spoke with  and asked to have a nurse call me back.

## 2024-02-07 NOTE — TELEPHONE ENCOUNTER
Called and spoke with Dr. Gorman office.  staff advised that patient called them yesterday regarding hold. Procedure cancelled for now.

## 2024-03-26 ENCOUNTER — TELEPHONE (OUTPATIENT)
Age: 89
End: 2024-03-26

## 2024-03-26 NOTE — TELEPHONE ENCOUNTER
Has an appointment in April but wanted to let Dr Woody know that he is still having a lot of light headedness.  Please advise.

## 2024-03-26 NOTE — TELEPHONE ENCOUNTER
Those readings certainly not low.  With CKD I'm hesitant to push meds.  Perhaps he should see PCP to evaluate non bp related causes of dizziness.  I would continue current meds

## 2024-03-26 NOTE — TELEPHONE ENCOUNTER
Spoke with pt he stated he is still having light headedness since the heart cath in January.     Sx come on at random during the day pt stated his BP has been liable still 183-130/77-60    Pt stated that at sometimes (not currently) that he get chest discomfort.     Med losartan 50mg, carvediliol 25mg    Last ov  1/19/2024

## 2024-04-23 NOTE — PROGRESS NOTES
[facility] CARDIOLOGY  [address] [place] [address], [address]  [place] [address]  PHONE: [phone]      24    NAME:  Wayne Dorsey  : 1935  MRN: [id number]         SUBJECTIVE:   Wayne Dorsey is a 88 y.o. male seen for a follow up visit regarding the following:     Chief Complaint   Patient presents with    3 Month Follow-Up    Coronary Artery Disease            HPI:  Follow up  3 Month Follow-Up and Coronary Artery Disease   .    Follow up chronic atypical shoulder and chest pain, hypertension, dyslipidemia, DM, CKD. Diffuse severe disease s/p complex PCI, detailed below. ARB reduced d/t hypotension and CKD.  He continues to have vague chest pain, now more right sided chest pain, there much of the time.  Non exertional.  Has been walking ~ 2 miles a day since he completed cardiac rehab.      He continues to complain of imbalance and dizziness/vertigo.  He has known cervical stenosis.  Symptoms are present constantly, without identifiable exacerbating or alleviating factors, including normalization of blood pressure.                    Past cardiac history:   Oct 2018 - vasodilator perfusion study normal   May 2020- normal carotid doppler   Mar 2021-24 hr average BP is elevated at 158/69 with a range of 109/35 (1 am) - 203/108 (afternoon).  Absence of nocturnal dipping with nighttime average 155/70, daytime average 159/69.  There are 2 peaks, one ~ 2 pm and one ~ 11 pm.    2023       NST - reported as normal but on personal review there is reversible inferolateral ischemia with SDS = 2, preserved EF, moderate risk scan  2024       LHC - 3.0 x 18 Eagle-ostial Cx, 3.0 x 12 Eagle-ostial LAD. Complicated by LM dissection, extended stent from distal LM to Cx.  Residual RCA disease for medical therapy with staged procedure if refractory.                  Cardiac Medications       Nitrates       nitroGLYCERIN (NITROSTAT) 0.4 MG SL tablet Place 1 tablet under the tongue every 5 minutes as needed

## 2024-04-24 ENCOUNTER — OFFICE VISIT (OUTPATIENT)
Age: 89
End: 2024-04-24
Payer: MEDICARE

## 2024-04-24 VITALS
SYSTOLIC BLOOD PRESSURE: 136 MMHG | HEIGHT: 63 IN | DIASTOLIC BLOOD PRESSURE: 60 MMHG | HEART RATE: 68 BPM | WEIGHT: 124 LBS | BODY MASS INDEX: 21.97 KG/M2

## 2024-04-24 DIAGNOSIS — I10 ESSENTIAL HYPERTENSION: ICD-10-CM

## 2024-04-24 DIAGNOSIS — I25.118 CORONARY ARTERY DISEASE OF NATIVE ARTERY OF NATIVE HEART WITH STABLE ANGINA PECTORIS (HCC): Primary | ICD-10-CM

## 2024-04-24 DIAGNOSIS — N18.32 STAGE 3B CHRONIC KIDNEY DISEASE (HCC): ICD-10-CM

## 2024-04-24 DIAGNOSIS — Z01.818 PRE-OP EVALUATION: ICD-10-CM

## 2024-04-24 PROCEDURE — G8420 CALC BMI NORM PARAMETERS: HCPCS | Performed by: INTERNAL MEDICINE

## 2024-04-24 PROCEDURE — 99214 OFFICE O/P EST MOD 30 MIN: CPT | Performed by: INTERNAL MEDICINE

## 2024-04-24 PROCEDURE — G8427 DOCREV CUR MEDS BY ELIG CLIN: HCPCS | Performed by: INTERNAL MEDICINE

## 2024-04-24 PROCEDURE — 1036F TOBACCO NON-USER: CPT | Performed by: INTERNAL MEDICINE

## 2024-04-24 PROCEDURE — 1123F ACP DISCUSS/DSCN MKR DOCD: CPT | Performed by: INTERNAL MEDICINE

## 2024-04-30 ENCOUNTER — HOSPITAL ENCOUNTER (OUTPATIENT)
Age: 89
Setting detail: OBSERVATION
Discharge: HOME | End: 2024-05-03
Attending: EMERGENCY MEDICINE | Admitting: INTERNAL MEDICINE
Payer: MEDICARE

## 2024-04-30 ENCOUNTER — APPOINTMENT (OUTPATIENT)
Dept: GENERAL RADIOLOGY | Age: 89
End: 2024-04-30
Payer: MEDICARE

## 2024-04-30 DIAGNOSIS — R07.9 CHEST PAIN: ICD-10-CM

## 2024-04-30 DIAGNOSIS — I20.0 INTERMEDIATE CORONARY SYNDROME (HCC): Primary | ICD-10-CM

## 2024-04-30 DIAGNOSIS — I20.0 UNSTABLE ANGINA PECTORIS (HCC): ICD-10-CM

## 2024-04-30 LAB
ANION GAP SERPL CALC-SCNC: 9 MMOL/L (ref 9–18)
BASOPHILS # BLD: 0 K/UL (ref 0–0.2)
BASOPHILS NFR BLD: 1 % (ref 0–2)
BUN SERPL-MCNC: 25 MG/DL (ref 8–23)
CALCIUM SERPL-MCNC: 9.2 MG/DL (ref 8.8–10.2)
CHLORIDE SERPL-SCNC: 106 MMOL/L (ref 98–107)
CO2 SERPL-SCNC: 26 MMOL/L (ref 20–28)
CREAT SERPL-MCNC: 1.61 MG/DL (ref 0.8–1.3)
DIFFERENTIAL METHOD BLD: ABNORMAL
EOSINOPHIL # BLD: 0.3 K/UL (ref 0–0.8)
EOSINOPHIL NFR BLD: 5 % (ref 0.5–7.8)
ERYTHROCYTE [DISTWIDTH] IN BLOOD BY AUTOMATED COUNT: 12.9 % (ref 11.9–14.6)
GLUCOSE SERPL-MCNC: 117 MG/DL (ref 70–99)
HCT VFR BLD AUTO: 29.8 % (ref 41.1–50.3)
HGB BLD-MCNC: 10.1 G/DL (ref 13.6–17.2)
IMM GRANULOCYTES # BLD AUTO: 0 K/UL (ref 0–0.5)
IMM GRANULOCYTES NFR BLD AUTO: 0 % (ref 0–5)
LYMPHOCYTES # BLD: 2.2 K/UL (ref 0.5–4.6)
LYMPHOCYTES NFR BLD: 43 % (ref 13–44)
MCH RBC QN AUTO: 29.4 PG (ref 26.1–32.9)
MCHC RBC AUTO-ENTMCNC: 33.9 G/DL (ref 31.4–35)
MCV RBC AUTO: 86.9 FL (ref 82–102)
MONOCYTES # BLD: 0.5 K/UL (ref 0.1–1.3)
MONOCYTES NFR BLD: 9 % (ref 4–12)
NEUTS SEG # BLD: 2.1 K/UL (ref 1.7–8.2)
NEUTS SEG NFR BLD: 42 % (ref 43–78)
NRBC # BLD: 0 K/UL (ref 0–0.2)
PLATELET # BLD AUTO: 152 K/UL (ref 150–450)
PMV BLD AUTO: 10.8 FL (ref 9.4–12.3)
POTASSIUM SERPL-SCNC: 4 MMOL/L (ref 3.5–5.1)
RBC # BLD AUTO: 3.43 M/UL (ref 4.23–5.6)
SODIUM SERPL-SCNC: 140 MMOL/L (ref 136–145)
TROPONIN T SERPL HS-MCNC: 347 NG/L (ref 0–22)
WBC # BLD AUTO: 5 K/UL (ref 4.3–11.1)

## 2024-04-30 PROCEDURE — 80048 BASIC METABOLIC PNL TOTAL CA: CPT

## 2024-04-30 PROCEDURE — 6360000002 HC RX W HCPCS: Performed by: EMERGENCY MEDICINE

## 2024-04-30 PROCEDURE — 85025 COMPLETE CBC W/AUTO DIFF WBC: CPT

## 2024-04-30 PROCEDURE — 96375 TX/PRO/DX INJ NEW DRUG ADDON: CPT

## 2024-04-30 PROCEDURE — 99285 EMERGENCY DEPT VISIT HI MDM: CPT

## 2024-04-30 PROCEDURE — 93005 ELECTROCARDIOGRAM TRACING: CPT | Performed by: EMERGENCY MEDICINE

## 2024-04-30 PROCEDURE — 71045 X-RAY EXAM CHEST 1 VIEW: CPT

## 2024-04-30 PROCEDURE — 84484 ASSAY OF TROPONIN QUANT: CPT

## 2024-04-30 PROCEDURE — 96374 THER/PROPH/DIAG INJ IV PUSH: CPT

## 2024-04-30 RX ORDER — MORPHINE SULFATE 4 MG/ML
4 INJECTION, SOLUTION INTRAMUSCULAR; INTRAVENOUS ONCE
Status: COMPLETED | OUTPATIENT
Start: 2024-04-30 | End: 2024-04-30

## 2024-04-30 RX ADMIN — MORPHINE SULFATE 4 MG: 4 INJECTION INTRAVENOUS at 22:15

## 2024-04-30 ASSESSMENT — PAIN SCALES - GENERAL
PAINLEVEL_OUTOF10: 5
PAINLEVEL_OUTOF10: 5

## 2024-04-30 ASSESSMENT — ENCOUNTER SYMPTOMS
CHEST TIGHTNESS: 0
SHORTNESS OF BREATH: 0

## 2024-04-30 ASSESSMENT — PAIN - FUNCTIONAL ASSESSMENT: PAIN_FUNCTIONAL_ASSESSMENT: 0-10

## 2024-05-01 PROBLEM — I20.0 INTERMEDIATE CORONARY SYNDROME (HCC): Status: ACTIVE | Noted: 2024-05-01

## 2024-05-01 LAB
ANION GAP SERPL CALC-SCNC: 8 MMOL/L (ref 9–18)
BUN SERPL-MCNC: 23 MG/DL (ref 8–23)
CALCIUM SERPL-MCNC: 8.8 MG/DL (ref 8.8–10.2)
CHLORIDE SERPL-SCNC: 108 MMOL/L (ref 98–107)
CO2 SERPL-SCNC: 24 MMOL/L (ref 20–28)
CREAT SERPL-MCNC: 1.54 MG/DL (ref 0.8–1.3)
ECHO BSA: 1.58 M2
EKG ATRIAL RATE: 49 BPM
EKG ATRIAL RATE: 60 BPM
EKG DIAGNOSIS: NORMAL
EKG DIAGNOSIS: NORMAL
EKG P AXIS: 37 DEGREES
EKG P AXIS: 71 DEGREES
EKG P-R INTERVAL: 179 MS
EKG P-R INTERVAL: 184 MS
EKG Q-T INTERVAL: 465 MS
EKG Q-T INTERVAL: 498 MS
EKG QRS DURATION: 112 MS
EKG QRS DURATION: 113 MS
EKG QTC CALCULATION (BAZETT): 449 MS
EKG QTC CALCULATION (BAZETT): 461 MS
EKG R AXIS: -26 DEGREES
EKG R AXIS: -6 DEGREES
EKG T AXIS: 64 DEGREES
EKG T AXIS: 73 DEGREES
EKG VENTRICULAR RATE: 49 BPM
EKG VENTRICULAR RATE: 59 BPM
GLUCOSE SERPL-MCNC: 110 MG/DL (ref 70–99)
POTASSIUM SERPL-SCNC: 3.9 MMOL/L (ref 3.5–5.1)
SODIUM SERPL-SCNC: 141 MMOL/L (ref 136–145)
TROPONIN T SERPL HS-MCNC: 321 NG/L (ref 0–22)
TROPONIN T SERPL HS-MCNC: 347 NG/L (ref 0–22)

## 2024-05-01 PROCEDURE — 2500000003 HC RX 250 WO HCPCS: Performed by: INTERNAL MEDICINE

## 2024-05-01 PROCEDURE — 99153 MOD SED SAME PHYS/QHP EA: CPT | Performed by: INTERNAL MEDICINE

## 2024-05-01 PROCEDURE — 85347 COAGULATION TIME ACTIVATED: CPT

## 2024-05-01 PROCEDURE — 84484 ASSAY OF TROPONIN QUANT: CPT

## 2024-05-01 PROCEDURE — 99152 MOD SED SAME PHYS/QHP 5/>YRS: CPT | Performed by: INTERNAL MEDICINE

## 2024-05-01 PROCEDURE — 93458 L HRT ARTERY/VENTRICLE ANGIO: CPT | Performed by: INTERNAL MEDICINE

## 2024-05-01 PROCEDURE — 96365 THER/PROPH/DIAG IV INF INIT: CPT

## 2024-05-01 PROCEDURE — 99223 1ST HOSP IP/OBS HIGH 75: CPT | Performed by: INTERNAL MEDICINE

## 2024-05-01 PROCEDURE — 6360000002 HC RX W HCPCS: Performed by: INTERNAL MEDICINE

## 2024-05-01 PROCEDURE — 92979 ENDOLUMINL IVUS OCT C EA: CPT | Performed by: INTERNAL MEDICINE

## 2024-05-01 PROCEDURE — 6370000000 HC RX 637 (ALT 250 FOR IP): Performed by: INTERNAL MEDICINE

## 2024-05-01 PROCEDURE — C1753 CATH, INTRAVAS ULTRASOUND: HCPCS | Performed by: INTERNAL MEDICINE

## 2024-05-01 PROCEDURE — C1769 GUIDE WIRE: HCPCS | Performed by: INTERNAL MEDICINE

## 2024-05-01 PROCEDURE — C1725 CATH, TRANSLUMIN NON-LASER: HCPCS | Performed by: INTERNAL MEDICINE

## 2024-05-01 PROCEDURE — 92920 PRQ TRLUML C ANGIOP 1ART&/BR: CPT | Performed by: INTERNAL MEDICINE

## 2024-05-01 PROCEDURE — 85347 COAGULATION TIME ACTIVATED: CPT | Performed by: INTERNAL MEDICINE

## 2024-05-01 PROCEDURE — 93010 ELECTROCARDIOGRAM REPORT: CPT | Performed by: INTERNAL MEDICINE

## 2024-05-01 PROCEDURE — 96366 THER/PROPH/DIAG IV INF ADDON: CPT

## 2024-05-01 PROCEDURE — 2700000000 HC OXYGEN THERAPY PER DAY

## 2024-05-01 PROCEDURE — 92978 ENDOLUMINL IVUS OCT C 1ST: CPT | Performed by: INTERNAL MEDICINE

## 2024-05-01 PROCEDURE — 6370000000 HC RX 637 (ALT 250 FOR IP): Performed by: NURSE PRACTITIONER

## 2024-05-01 PROCEDURE — 6360000004 HC RX CONTRAST MEDICATION: Performed by: INTERNAL MEDICINE

## 2024-05-01 PROCEDURE — 94760 N-INVAS EAR/PLS OXIMETRY 1: CPT

## 2024-05-01 PROCEDURE — 2580000003 HC RX 258: Performed by: NURSE PRACTITIONER

## 2024-05-01 PROCEDURE — C1894 INTRO/SHEATH, NON-LASER: HCPCS | Performed by: INTERNAL MEDICINE

## 2024-05-01 PROCEDURE — 36415 COLL VENOUS BLD VENIPUNCTURE: CPT

## 2024-05-01 PROCEDURE — 2709999900 HC NON-CHARGEABLE SUPPLY: Performed by: INTERNAL MEDICINE

## 2024-05-01 PROCEDURE — 80048 BASIC METABOLIC PNL TOTAL CA: CPT

## 2024-05-01 PROCEDURE — C1887 CATHETER, GUIDING: HCPCS | Performed by: INTERNAL MEDICINE

## 2024-05-01 PROCEDURE — G0378 HOSPITAL OBSERVATION PER HR: HCPCS

## 2024-05-01 RX ORDER — SODIUM CHLORIDE 0.9 % (FLUSH) 0.9 %
5-40 SYRINGE (ML) INJECTION PRN
Status: DISCONTINUED | OUTPATIENT
Start: 2024-05-01 | End: 2024-05-03 | Stop reason: HOSPADM

## 2024-05-01 RX ORDER — MORPHINE SULFATE 2 MG/ML
2 INJECTION, SOLUTION INTRAMUSCULAR; INTRAVENOUS
Status: ACTIVE | OUTPATIENT
Start: 2024-05-01 | End: 2024-05-02

## 2024-05-01 RX ORDER — TRAMADOL HYDROCHLORIDE 50 MG/1
50 TABLET ORAL EVERY 6 HOURS PRN
Status: DISCONTINUED | OUTPATIENT
Start: 2024-05-01 | End: 2024-05-03 | Stop reason: HOSPADM

## 2024-05-01 RX ORDER — POTASSIUM CHLORIDE 7.45 MG/ML
10 INJECTION INTRAVENOUS PRN
Status: DISCONTINUED | OUTPATIENT
Start: 2024-05-01 | End: 2024-05-01

## 2024-05-01 RX ORDER — SODIUM CHLORIDE 9 MG/ML
INJECTION, SOLUTION INTRAVENOUS PRN
Status: DISCONTINUED | OUTPATIENT
Start: 2024-05-01 | End: 2024-05-03 | Stop reason: HOSPADM

## 2024-05-01 RX ORDER — SODIUM CHLORIDE 0.9 % (FLUSH) 0.9 %
5-40 SYRINGE (ML) INJECTION EVERY 12 HOURS SCHEDULED
Status: DISCONTINUED | OUTPATIENT
Start: 2024-05-01 | End: 2024-05-03 | Stop reason: HOSPADM

## 2024-05-01 RX ORDER — ACETAMINOPHEN 325 MG/1
650 TABLET ORAL EVERY 4 HOURS PRN
Status: DISCONTINUED | OUTPATIENT
Start: 2024-05-01 | End: 2024-05-01 | Stop reason: SDUPTHER

## 2024-05-01 RX ORDER — MIDAZOLAM HYDROCHLORIDE 1 MG/ML
INJECTION INTRAMUSCULAR; INTRAVENOUS PRN
Status: DISCONTINUED | OUTPATIENT
Start: 2024-05-01 | End: 2024-05-01 | Stop reason: HOSPADM

## 2024-05-01 RX ORDER — MORPHINE SULFATE 2 MG/ML
2 INJECTION, SOLUTION INTRAMUSCULAR; INTRAVENOUS EVERY 4 HOURS PRN
Status: DISCONTINUED | OUTPATIENT
Start: 2024-05-01 | End: 2024-05-01 | Stop reason: SDUPTHER

## 2024-05-01 RX ORDER — ROSUVASTATIN CALCIUM 20 MG/1
20 TABLET, COATED ORAL DAILY
Status: DISCONTINUED | OUTPATIENT
Start: 2024-05-01 | End: 2024-05-03 | Stop reason: HOSPADM

## 2024-05-01 RX ORDER — POLYETHYLENE GLYCOL 3350 17 G/17G
17 POWDER, FOR SOLUTION ORAL DAILY PRN
Status: DISCONTINUED | OUTPATIENT
Start: 2024-05-01 | End: 2024-05-03 | Stop reason: HOSPADM

## 2024-05-01 RX ORDER — MORPHINE SULFATE 2 MG/ML
2 INJECTION, SOLUTION INTRAMUSCULAR; INTRAVENOUS
Status: DISCONTINUED | OUTPATIENT
Start: 2024-05-01 | End: 2024-05-03 | Stop reason: HOSPADM

## 2024-05-01 RX ORDER — EPTIFIBATIDE 0.75 MG/ML
1 INJECTION, SOLUTION INTRAVENOUS CONTINUOUS
Status: DISCONTINUED | OUTPATIENT
Start: 2024-05-01 | End: 2024-05-01

## 2024-05-01 RX ORDER — ONDANSETRON 2 MG/ML
4 INJECTION INTRAMUSCULAR; INTRAVENOUS EVERY 4 HOURS PRN
Status: DISCONTINUED | OUTPATIENT
Start: 2024-05-01 | End: 2024-05-03 | Stop reason: HOSPADM

## 2024-05-01 RX ORDER — HEPARIN SODIUM 200 [USP'U]/100ML
INJECTION, SOLUTION INTRAVENOUS CONTINUOUS PRN
Status: DISCONTINUED | OUTPATIENT
Start: 2024-05-01 | End: 2024-05-01 | Stop reason: HOSPADM

## 2024-05-01 RX ORDER — SUCRALFATE 1 G/1
1 TABLET ORAL 4 TIMES DAILY
Status: DISCONTINUED | OUTPATIENT
Start: 2024-05-01 | End: 2024-05-03 | Stop reason: HOSPADM

## 2024-05-01 RX ORDER — POTASSIUM CHLORIDE 20 MEQ/1
40 TABLET, EXTENDED RELEASE ORAL PRN
Status: DISCONTINUED | OUTPATIENT
Start: 2024-05-01 | End: 2024-05-01

## 2024-05-01 RX ORDER — ACETAMINOPHEN 325 MG/1
650 TABLET ORAL EVERY 6 HOURS PRN
Status: DISCONTINUED | OUTPATIENT
Start: 2024-05-01 | End: 2024-05-03 | Stop reason: HOSPADM

## 2024-05-01 RX ORDER — CARVEDILOL 12.5 MG/1
12.5 TABLET ORAL 2 TIMES DAILY WITH MEALS
Status: DISCONTINUED | OUTPATIENT
Start: 2024-05-01 | End: 2024-05-02

## 2024-05-01 RX ORDER — MAGNESIUM SULFATE IN WATER 40 MG/ML
2000 INJECTION, SOLUTION INTRAVENOUS PRN
Status: DISCONTINUED | OUTPATIENT
Start: 2024-05-01 | End: 2024-05-01

## 2024-05-01 RX ORDER — LIDOCAINE HYDROCHLORIDE 10 MG/ML
INJECTION, SOLUTION INFILTRATION; PERINEURAL PRN
Status: DISCONTINUED | OUTPATIENT
Start: 2024-05-01 | End: 2024-05-01 | Stop reason: HOSPADM

## 2024-05-01 RX ORDER — NITROGLYCERIN 0.4 MG/1
0.4 TABLET SUBLINGUAL EVERY 5 MIN PRN
Status: DISCONTINUED | OUTPATIENT
Start: 2024-05-01 | End: 2024-05-03 | Stop reason: HOSPADM

## 2024-05-01 RX ORDER — EPTIFIBATIDE 0.75 MG/ML
1 INJECTION, SOLUTION INTRAVENOUS CONTINUOUS
Status: ACTIVE | OUTPATIENT
Start: 2024-05-01 | End: 2024-05-02

## 2024-05-01 RX ORDER — TRAZODONE HYDROCHLORIDE 50 MG/1
50 TABLET ORAL NIGHTLY PRN
Status: DISCONTINUED | OUTPATIENT
Start: 2024-05-01 | End: 2024-05-03 | Stop reason: HOSPADM

## 2024-05-01 RX ORDER — TRAMADOL HYDROCHLORIDE 50 MG/1
100 TABLET ORAL EVERY 6 HOURS PRN
Status: DISCONTINUED | OUTPATIENT
Start: 2024-05-01 | End: 2024-05-03 | Stop reason: HOSPADM

## 2024-05-01 RX ORDER — ASPIRIN 81 MG/1
81 TABLET, CHEWABLE ORAL DAILY
Status: DISCONTINUED | OUTPATIENT
Start: 2024-05-01 | End: 2024-05-01

## 2024-05-01 RX ORDER — PANTOPRAZOLE SODIUM 40 MG/1
40 TABLET, DELAYED RELEASE ORAL
Status: DISCONTINUED | OUTPATIENT
Start: 2024-05-01 | End: 2024-05-03 | Stop reason: HOSPADM

## 2024-05-01 RX ORDER — 0.9 % SODIUM CHLORIDE 0.9 %
500 INTRAVENOUS SOLUTION INTRAVENOUS PRN
Status: DISCONTINUED | OUTPATIENT
Start: 2024-05-01 | End: 2024-05-03 | Stop reason: HOSPADM

## 2024-05-01 RX ORDER — MORPHINE SULFATE 4 MG/ML
4 INJECTION, SOLUTION INTRAMUSCULAR; INTRAVENOUS
Status: DISCONTINUED | OUTPATIENT
Start: 2024-05-01 | End: 2024-05-03 | Stop reason: HOSPADM

## 2024-05-01 RX ORDER — CLOPIDOGREL BISULFATE 75 MG/1
75 TABLET ORAL DAILY
Status: DISCONTINUED | OUTPATIENT
Start: 2024-05-01 | End: 2024-05-01

## 2024-05-01 RX ORDER — ATROPINE SULFATE 0.4 MG/ML
0.5 INJECTION, SOLUTION ENDOTRACHEAL; INTRAMEDULLARY; INTRAMUSCULAR; INTRAVENOUS; SUBCUTANEOUS
Status: DISPENSED | OUTPATIENT
Start: 2024-05-01 | End: 2024-05-02

## 2024-05-01 RX ORDER — ASPIRIN 81 MG/1
81 TABLET ORAL DAILY
Status: DISCONTINUED | OUTPATIENT
Start: 2024-05-01 | End: 2024-05-03 | Stop reason: HOSPADM

## 2024-05-01 RX ORDER — SODIUM CHLORIDE 9 MG/ML
INJECTION, SOLUTION INTRAVENOUS CONTINUOUS
Status: DISCONTINUED | OUTPATIENT
Start: 2024-05-01 | End: 2024-05-03 | Stop reason: HOSPADM

## 2024-05-01 RX ORDER — HEPARIN SODIUM 10000 [USP'U]/ML
INJECTION, SOLUTION INTRAVENOUS; SUBCUTANEOUS PRN
Status: DISCONTINUED | OUTPATIENT
Start: 2024-05-01 | End: 2024-05-01 | Stop reason: HOSPADM

## 2024-05-01 RX ADMIN — TRAZODONE HYDROCHLORIDE 50 MG: 50 TABLET ORAL at 20:53

## 2024-05-01 RX ADMIN — EPTIFIBATIDE 1 MCG/KG/MIN: 0.75 INJECTION INTRAVENOUS at 21:06

## 2024-05-01 RX ADMIN — ROSUVASTATIN CALCIUM 20 MG: 20 TABLET, COATED ORAL at 10:25

## 2024-05-01 RX ADMIN — SODIUM CHLORIDE, PRESERVATIVE FREE 5 ML: 5 INJECTION INTRAVENOUS at 20:53

## 2024-05-01 RX ADMIN — PANTOPRAZOLE SODIUM 40 MG: 40 TABLET, DELAYED RELEASE ORAL at 04:56

## 2024-05-01 RX ADMIN — SUCRALFATE 1 G: 1 TABLET ORAL at 17:27

## 2024-05-01 RX ADMIN — CLOPIDOGREL BISULFATE 75 MG: 75 TABLET ORAL at 10:25

## 2024-05-01 RX ADMIN — TICAGRELOR 90 MG: 90 TABLET ORAL at 20:53

## 2024-05-01 RX ADMIN — SODIUM CHLORIDE, PRESERVATIVE FREE 10 ML: 5 INJECTION INTRAVENOUS at 10:27

## 2024-05-01 RX ADMIN — SUCRALFATE 1 G: 1 TABLET ORAL at 00:39

## 2024-05-01 RX ADMIN — SUCRALFATE 1 G: 1 TABLET ORAL at 20:53

## 2024-05-01 RX ADMIN — ASPIRIN 81 MG: 81 TABLET, COATED ORAL at 10:25

## 2024-05-01 RX ADMIN — CARVEDILOL 12.5 MG: 12.5 TABLET, FILM COATED ORAL at 10:25

## 2024-05-01 RX ADMIN — SODIUM CHLORIDE: 9 INJECTION, SOLUTION INTRAVENOUS at 00:44

## 2024-05-01 RX ADMIN — SUCRALFATE 1 G: 1 TABLET ORAL at 10:25

## 2024-05-01 ASSESSMENT — ENCOUNTER SYMPTOMS
GASTROINTESTINAL NEGATIVE: 1
EYES NEGATIVE: 1
RESPIRATORY NEGATIVE: 1
ALLERGIC/IMMUNOLOGIC NEGATIVE: 1

## 2024-05-01 ASSESSMENT — PAIN SCALES - GENERAL
PAINLEVEL_OUTOF10: 0
PAINLEVEL_OUTOF10: 0

## 2024-05-01 NOTE — PROGRESS NOTES
Radial compression band removed at 1820 after slowly reducing air from 12 cc to zero as per hospital protocol.  No bleeding or hematoma noted. 2 x 2 gauze with tegaderm placed over puncture site. The affected extremity is warm and dry to the touch. Frequent vital signs printed and placed on bedside chart.  Patient instructed to call if any bleeding noted on gauze.  Patient verbalized understanding the nursing instructions.

## 2024-05-01 NOTE — ED PROVIDER NOTES
Emergency Department Provider Note       PCP: Nathan Thorne MD   Age: 88 y.o.   Sex: male     DISPOSITION Decision To Admit 04/30/2024 11:31:57 PM       ICD-10-CM    1. Intermediate coronary syndrome (HCC)  I20.0           Medical Decision Making     Patient reported some continued mild improvement in the chest discomfort.  However troponin did result is significantly elevated at over 300.  No other clinically significant abnormalities were noted.  Repeat EKG performed at 2318: This shows a sinus arrhythmia again with a rate of 53 with LVH and repolarization abnormality.  No acute ischemic changes.  Will refer to and consult cardiology.     1 acute complicated illness or injury.  Discussion with external consultants.  Shared medical decision making was utilized in creating the patients health plan today.    I independently ordered and reviewed each unique test.       I interpreted the EKG performed at 2203: Is a sinus arrhythmia with a rate of 59 bpm left ventricular hypertrophy is present.  No acute ischemic changes..              History     Patient was brought by EMS from home with complaints of left-sided lower chest pain.  Patient has a history of coronary disease and had stent placement in January of this year.  He is finished cardiac rehab and was at the gym this morning and stated he normally can walk 2 miles but had difficulty walking 1 mile this morning with some discomfort in the left side of his chest.  He states the discomfort that he is having now is different and has been present constantly for about the past 2 to 3 hours in the lower left side of the chest.  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  He did report some mild improvement after receiving nitroglycerin by EMS.  He denies any shortness of breath and review of systems is otherwise negative.  There are no increasing or decreasing factors associated with  daily    CARBOXYMETHYLCELLULOSE (THERATEARS) 1 % OPHTHALMIC GEL    Apply to eye 2 times daily    CARVEDILOL (COREG) 12.5 MG TABLET    Take 1 tablet by mouth 2 times daily (with meals)    CLOPIDOGREL (PLAVIX) 75 MG TABLET    Take 1 tablet by mouth daily    DOXYLAMINE SUCCINATE, SLEEP, (UNISOM PO)    Take by mouth    FAMOTIDINE (PEPCID) 40 MG TABLET    Take 1 tablet by mouth every evening    GLIPIZIDE (GLUCOTROL) 5 MG TABLET    Take 0.5 tablets by mouth daily    IPRATROPIUM (ATROVENT) 0.03 % NASAL SPRAY        LACTOBACILLUS (PROBIOTIC ACIDOPHILUS PO)    Take by mouth    LOSARTAN (COZAAR) 100 MG TABLET    Take 0.5 tablets by mouth daily    NITROGLYCERIN (NITROSTAT) 0.4 MG SL TABLET    Place 1 tablet under the tongue every 5 minutes as needed for Chest pain (X 3 DOSES, THEN CALL 911) up to max of 3 total doses. If no relief after 1 dose, call 911.    PANTOPRAZOLE SODIUM (PROTONIX) 40 MG PACK PACKET    Take 1 packet by mouth every morning (before breakfast)    ROSUVASTATIN (CRESTOR) 20 MG TABLET    Take 1 tablet by mouth daily    SUCRALFATE (CARAFATE) 1 GM TABLET    Take 1 tablet by mouth 4 times daily    TRAZODONE (DESYREL) 50 MG TABLET    TAKE 1-2 TABLETS BY MOUTH EVERY NIGHT AT BEDTIME AS NEEDED FOR SLEEP    VITAMIN D (CHOLECALCIFEROL) 25 MCG (1000 UT) TABS TABLET    Take 1 tablet by mouth daily        Results for orders placed or performed during the hospital encounter of 04/30/24   XR CHEST PORTABLE    Narrative    Chest X-ray    INDICATION: Chest pain    COMPARISON: 30 October 2023 chest x-ray    TECHNIQUE:  1 frontal view of the chest was obtained.    FINDINGS:  Postoperative changes of the cervical spine are incompletely imaged.  Lungs are adequately expanded.  Pulmonary vascularity is upper limits of normal.   Heart measures within normal.  Costophrenic angles are clear.        Impression    No plain film evidence for acute cardiopulmonary disease.   Basic Metabolic Panel   Result Value Ref Range    Sodium 140

## 2024-05-01 NOTE — PROGRESS NOTES
TRANSFER - IN REPORT:    Verbal report received from CHARLES Gandhi on Wayne Dorsey being received from [facility] for routine progression of patient care      Report consisted of patient’s Situation, Background, Assessment and Recommendations(SBAR).     Information from the following report(s) SBAR, Procedure Summary, and MAR was reviewed with the receiving nurse.    Opportunity for questions and clarification was provided.      Assessment completed upon patient’s arrival to unit and care assumed.

## 2024-05-01 NOTE — ED TRIAGE NOTES
Patient arrives via EMS from home with chest pain. States this started around 1900, states hasn't felt well since this morning. Patient rated pain 7/10 with ems. Ems noted sinus barry. Patient has taken 324 asa, 1 nitro by ems with relief. Patient alert and oriented upon arrives, states pain 5/10.

## 2024-05-01 NOTE — H&P
[facility] Cardiology History & Physical      Date of  Admission: 4/30/2024  9:58 PM     Primary Care Physician: Dr. Nathan Thorne  Primary Cardiologist: Dr. Woody  Admitting Physician: Dr. Steinberg    CC: chest pain    HPI:  Wayne Dorsey is a 88 y.o. male with past medical history of chronic pain, CAD, GERD, HTN, HLD, and DM2 who presented to the ER with complaints of chest pain. Patient reports that his pain started yesterday morning. Reports taking SL nitro with improvement. States that he normally walks 2 miles per day but was only able to walk 1 miles yesterday due to exertional pain. He also describes discomfort that is more consistent with his chronic pain that is different than pain yesterday morning.     Upon arrival to the ER, EKG shows sinus arrhythmia without acute ST/T wave changes. Initial hs-troponin is elevated at 347. He was given IV morphine in the ER for treatment. Repeat troponin remains at 347.     Given 324mg ASA and SL nitro x 1 by EMS     Past Medical History:   Diagnosis Date    Arrhythmia     since a child, benign per patient     Arthritis     BPPV (benign paroxysmal positional vertigo)     Carotid stenosis     Hx of left endarterectomy    Chronic kidney disease     long hx of renal insufficiency    Depression     much improved at this time    Diabetes (HCC) dx 1995    typell, avgfbs 100, check glucose 3 times a week, last A1C was 7, does not recognize lows    GERD (gastroesophageal reflux disease)     controlled by medication    Hyperlipemia     Hypertension     Osteoarthritis, knee     PUD (peptic ulcer disease) 05/2016    treated with protonix    TIA (transient ischemic attack)     2003     TIA (transient ischemic attack) 2003     my weakness on left side    Varicella       Past Surgical History:   Procedure Laterality Date    CARDIAC PROCEDURE N/A 1/11/2024    Left heart cath / coronary angiography performed by Calvin Steinberg MD at  CARDIAC CATH LAB    CARDIAC PROCEDURE N/A  K/UL   Troponin    Collection Time: 04/30/24 10:10 PM   Result Value Ref Range    Troponin T 347.0 (HH) 0 - 22 ng/L       Patient has been seen and examined by Dr. Steinberg and he agrees with the following assessment and plan:     Assessment/Plan:        Chest pain -- admit to telemetry. Follow serial cardiac enzymes. Continue ASA, plavix and statin therapy on admission. NPO now with IV hydration. Plan for LHC with possible PCI later today.       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 See above.        GERD (gastroesophageal reflux disease) -- continue carafate and PPI      Chronic kidney disease -- IV hydration prior to LHC. Hold ARB on admission.       Hypertension -- holding ARB. Continue BB. Monitor with PRN topical nitrates.       Hyperlipemia -- on crestor 20mg daily.       Diabetes (HCC) -- holding oral medications while NPO. Resume once able to eat.            DENZEL Jordan - CNP  5/1/2024 12:00 AM

## 2024-05-01 NOTE — PROGRESS NOTES
4 Eyes Skin Assessment     NAME:  Wayne Dorsey  YOB: 1935  MEDICAL RECORD NUMBER:  [id number]    The patient is being assessed for  Admission    I agree that at least one RN has performed a thorough Head to Toe Skin Assessment on the patient. ALL assessment sites listed below have been assessed.      Areas assessed by both nurses:    Head, Face, Ears, Shoulders, Back, Chest, Arms, Elbows, Hands, Sacrum. Buttock, Coccyx, Ischium, and Legs. Feet and Heels        Does the Patient have a Wound? No noted wound(s)       Rm Prevention initiated by RN: Yes  Wound Care Orders initiated by RN: No    Pressure Injury (Stage 3,4, Unstageable, DTI, NWPT, and Complex wounds) if present, place Wound referral order by RN under : No    New Ostomies, if present place, Ostomy referral order under : No     Nurse 1 eSignature: Electronically signed by CODY CABRERA RN on 5/1/24 at 3:00 AM EDT    **SHARE this note so that the co-signing nurse can place an eSignature**    Nurse 2 eSignature: Electronically signed by Beltran Prince RN on 5/1/24 at 3:10 AM EDT

## 2024-05-01 NOTE — PROGRESS NOTES
TRANSFER - OUT REPORT:    Verbal report given to CHARLES Villarreal on Wayne Dorsey  being transferred to 3rd floor room 337 for routine progression of patient care       Report consisted of patient’s Situation, Background, Assessment and Recommendations(SBAR).     Information from the following report(s) SBAR, Kardex, Procedure Summary, MAR, and Recent Results was reviewed with the receiving nurse.    Opportunity for questions and clarification was provided.       Integrilin drip to be started two hours after TR band comes off per Dr. Steinberg.

## 2024-05-01 NOTE — PROGRESS NOTES
PCI w/ Dr. Steinberg  Angioplasty to pCx and pLAD  R radial access  TR band to R radial @ 12mL  No s/sxs of bleeding or hematoma to R radial access site    Heparin 7000 units IC  Versed 3mg IV  Fentanyl 75mcg IV  Brilinta 180mg PO    TRANSFER - OUT REPORT:    Verbal report given to CHARLES Butt on Wayne Dorsey  being transferred to CPRU for routine progression of patient care       Report consisted of patient's Situation, Background, Assessment and   Recommendations(SBAR).     Information from the following report(s) Nurse Handoff Report and MAR was reviewed with the receiving nurse.    Opportunity for questions and clarification was provided.      Patient transported with:  Registered Nurse and Tech

## 2024-05-01 NOTE — PROGRESS NOTES
Informed by floor nurse that pt's HR dropped to low 40's when returning to unit from [facility]. Notified SAYRA García. No new orders. Pt on cycling BP's. Bed alarm on. Will continue to monitor.

## 2024-05-01 NOTE — ED NOTES
TRANSFER - OUT REPORT:    Verbal report given to CHARLES Palomo on Wayne Dorsey  being transferred to [facility] for routine progression of patient care       Report consisted of patient's Situation, Background, Assessment and   Recommendations(SBAR).     Information from the following report(s) Nurse Handoff Report, Index, ED Encounter Summary, ED SBAR, MAR, and Recent Results was reviewed with the receiving nurse.    [place] Fall Assessment:    Presents to emergency department  because of falls (Syncope, seizure, or loss of consciousness): No  Age > 70: Yes  Altered Mental Status, Intoxication with alcohol or substance confusion (Disorientation, impaired judgment, poor safety awaremess, or inability to follow instructions): No  Impaired Mobility: Ambulates or transfers with assistive devices or assistance; Unable to ambulate or transer.: No  Nursing Judgement: No          Lines:   Peripheral IV 04/30/24 Distal;Right Cephalic (Active)        Opportunity for questions and clarification was provided.      Patient transported with:  Monitor and Registered Nurse           Fidencio, Sydnee, RN  05/01/24 0006

## 2024-05-01 NOTE — PROGRESS NOTES
TRANSFER - IN REPORT:    Verbal report received from Yulissa SOTO on Wayne Dorsey being received from cath lab  for routine progression of care.     Report consisted of patient’s Situation, Background, Assessment and Recommendations(SBAR).     Information from the following report(s) Procedure Summary was reviewed. Opportunity for questions and clarification was provided.      Assessment completed upon patient’s arrival to unit and care assumed.     Patient received to room 337. Patient connected to monitor and assessment completed. Plan of care reviewed. Patient oriented to room and call light. Patient aware to use call light to communicate any chest pain or needs.       Informed by cath lab RN to begin removing air from TR band at 1430. Start Integrelin gtt 2 hrs after TR band is removed.

## 2024-05-02 LAB
ACT BLD: 239 SECS (ref 70–128)
ACT BLD: 250 SECS (ref 70–128)
ACT BLD: 266 SECS (ref 70–128)
ACT BLD: 293 SECS (ref 70–128)
ACT BLD: 336 SECS (ref 70–128)
ANION GAP SERPL CALC-SCNC: 10 MMOL/L (ref 9–18)
BUN SERPL-MCNC: 22 MG/DL (ref 8–23)
CALCIUM SERPL-MCNC: 9 MG/DL (ref 8.8–10.2)
CHLORIDE SERPL-SCNC: 105 MMOL/L (ref 98–107)
CO2 SERPL-SCNC: 22 MMOL/L (ref 20–28)
CREAT SERPL-MCNC: 1.51 MG/DL (ref 0.8–1.3)
ERYTHROCYTE [DISTWIDTH] IN BLOOD BY AUTOMATED COUNT: 12.9 % (ref 11.9–14.6)
GLUCOSE SERPL-MCNC: 200 MG/DL (ref 70–99)
HCT VFR BLD AUTO: 31.5 % (ref 41.1–50.3)
HGB BLD-MCNC: 10.9 G/DL (ref 13.6–17.2)
MCH RBC QN AUTO: 29.6 PG (ref 26.1–32.9)
MCHC RBC AUTO-ENTMCNC: 34.6 G/DL (ref 31.4–35)
MCV RBC AUTO: 85.6 FL (ref 82–102)
NRBC # BLD: 0 K/UL (ref 0–0.2)
PLATELET # BLD AUTO: 141 K/UL (ref 150–450)
PMV BLD AUTO: 11.4 FL (ref 9.4–12.3)
POTASSIUM SERPL-SCNC: 3.8 MMOL/L (ref 3.5–5.1)
RBC # BLD AUTO: 3.68 M/UL (ref 4.23–5.6)
SODIUM SERPL-SCNC: 138 MMOL/L (ref 136–145)
WBC # BLD AUTO: 6.8 K/UL (ref 4.3–11.1)

## 2024-05-02 PROCEDURE — 6370000000 HC RX 637 (ALT 250 FOR IP): Performed by: NURSE PRACTITIONER

## 2024-05-02 PROCEDURE — 36415 COLL VENOUS BLD VENIPUNCTURE: CPT

## 2024-05-02 PROCEDURE — 80048 BASIC METABOLIC PNL TOTAL CA: CPT

## 2024-05-02 PROCEDURE — 6370000000 HC RX 637 (ALT 250 FOR IP): Performed by: INTERNAL MEDICINE

## 2024-05-02 PROCEDURE — 2580000003 HC RX 258: Performed by: INTERNAL MEDICINE

## 2024-05-02 PROCEDURE — G0378 HOSPITAL OBSERVATION PER HR: HCPCS

## 2024-05-02 PROCEDURE — 2580000003 HC RX 258: Performed by: NURSE PRACTITIONER

## 2024-05-02 PROCEDURE — 96366 THER/PROPH/DIAG IV INF ADDON: CPT

## 2024-05-02 PROCEDURE — 99232 SBSQ HOSP IP/OBS MODERATE 35: CPT | Performed by: INTERNAL MEDICINE

## 2024-05-02 PROCEDURE — 85027 COMPLETE CBC AUTOMATED: CPT

## 2024-05-02 RX ORDER — CARVEDILOL 25 MG/1
25 TABLET ORAL 2 TIMES DAILY WITH MEALS
Status: DISCONTINUED | OUTPATIENT
Start: 2024-05-02 | End: 2024-05-03 | Stop reason: HOSPADM

## 2024-05-02 RX ADMIN — SODIUM CHLORIDE, PRESERVATIVE FREE 10 ML: 5 INJECTION INTRAVENOUS at 20:03

## 2024-05-02 RX ADMIN — ROSUVASTATIN CALCIUM 20 MG: 20 TABLET, COATED ORAL at 08:58

## 2024-05-02 RX ADMIN — SODIUM CHLORIDE, PRESERVATIVE FREE 10 ML: 5 INJECTION INTRAVENOUS at 20:02

## 2024-05-02 RX ADMIN — CARVEDILOL 12.5 MG: 12.5 TABLET, FILM COATED ORAL at 08:57

## 2024-05-02 RX ADMIN — TICAGRELOR 90 MG: 90 TABLET ORAL at 08:58

## 2024-05-02 RX ADMIN — SODIUM CHLORIDE, PRESERVATIVE FREE 10 ML: 5 INJECTION INTRAVENOUS at 08:58

## 2024-05-02 RX ADMIN — SUCRALFATE 1 G: 1 TABLET ORAL at 17:55

## 2024-05-02 RX ADMIN — SUCRALFATE 1 G: 1 TABLET ORAL at 08:57

## 2024-05-02 RX ADMIN — PANTOPRAZOLE SODIUM 40 MG: 40 TABLET, DELAYED RELEASE ORAL at 06:57

## 2024-05-02 RX ADMIN — CARVEDILOL 25 MG: 25 TABLET, FILM COATED ORAL at 17:55

## 2024-05-02 RX ADMIN — SUCRALFATE 1 G: 1 TABLET ORAL at 13:50

## 2024-05-02 RX ADMIN — SUCRALFATE 1 G: 1 TABLET ORAL at 20:02

## 2024-05-02 RX ADMIN — ASPIRIN 81 MG: 81 TABLET, COATED ORAL at 08:58

## 2024-05-02 RX ADMIN — TICAGRELOR 90 MG: 90 TABLET ORAL at 20:02

## 2024-05-02 ASSESSMENT — PAIN SCALES - GENERAL: PAINLEVEL_OUTOF10: 0

## 2024-05-02 NOTE — PROGRESS NOTES
5/2/2024 12:30 PM    Admit Date: 4/30/2024    Admit Diagnosis: Intermediate coronary syndrome (HCC) [I20.0]  Chest pain [R07.9]      Subjective:   Some chest pain and shortness of breath overnight.  Feels better this morning      Objective:    BP (!) 147/56   Pulse 58   Temp 97.9 °F (36.6 °C) (Axillary)   Resp 16   Ht 1.6 m (5' 3\")   Wt 55.8 kg (123 lb 1.6 oz)   SpO2 98%   BMI 21.81 kg/m²     Physical Exam:  General-Well Developed, Well Nourished, No Acute Distress, Alert & Oriented x 3, appropriate mood.  Neck- supple, no JVD  CV- regular rate and rhythm no MRG  Lung- clear bilaterally  Abd- soft, nontender, nondistended  Ext- no edema bilaterally.  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  Will monitor another day with ambulation.  Will increase Coreg to 25 mg twice daily.  Likely discharge in a.m.      Hyperlipemia      GERD (gastroesophageal reflux disease)      Chronic kidney disease      Hypertension      Diabetes (HCC)

## 2024-05-02 NOTE — PROGRESS NOTES
Called by nursing staff patient with small amount of oozing at right radial cath site. No pulsatile blood flow. Change dressings at this time, monitor for pain/pallor in arm. No reported symptoms of pain or pallor on exam per nursing staff. TR band removed approximately 2 hours ago, Integrelin drip as ordered by interventional cardiology to start now, as per cath report 12 hours in total.

## 2024-05-02 NOTE — ACP (ADVANCE CARE PLANNING)
Advance Care Planning     Advance Care Planning Clinical Specialist  Conversation Note      Date of ACP Conversation: 5/2/2024    Conversation Conducted with: Patient with Decision Making Capacity    ACP Clinical Specialist: TYRA BALL RN    Healthcare Decision Maker:     Current Designated Healthcare Decision Maker:     Primary Decision Maker: Enedina Dorsey - Brother/Sister - [phone]  Click here to complete Healthcare Decision Makers including section of the Healthcare Decision Maker Relationship (ie \"Primary\")  Today we documented Decision Maker(s) consistent with Legal Next of Kin hierarchy.    Care Preferences- Full Code Status.

## 2024-05-02 NOTE — CARE COORDINATION
Patient hospitalized in Obs status for chest pain. [facility] with PCI to LAD & Cx 5/1. Cardiac Rehab qualified.Per note, patient declined rehab at this point and will continue his own exercise regimen.  CM met with patient to assess for transition of care. Alert & oriented X 4. Verified PCP, demographic, and insurance. Prescriptions are affordable. No DME. Lives alone in a single level home. Drives himself. SMITHA is sister, Enedina, who lives locally.  No CM needs identified at this time. CM will continue to follow.   05/02/24 0935   Service Assessment   Patient Orientation Alert and Oriented   Cognition Alert   History Provided By Patient   Primary Caregiver Self   Accompanied By/Relationship No one at bedside   Support Systems Family Members  (Sister)   Patient's Healthcare Decision Maker is: Legal Next of Kin   PCP Verified by CM Yes   Last Visit to PCP Within last 3 months   Prior Functional Level Independent in ADLs/IADLs   Current Functional Level Independent in ADLs/IADLs   Can patient return to prior living arrangement Yes   Ability to make needs known: Good   Family able to assist with home care needs: Yes   Would you like for me to discuss the discharge plan with any other family members/significant others, and if so, who? No   Financial Resources Medicare   Community Resources None   Social/Functional History   Lives With Alone   Type of Home House   Home Equipment None   Receives Help From Family   ADL Assistance Independent   Homemaking Assistance Independent   Ambulation Assistance Independent   Transfer Assistance Independent   Active  Yes   Mode of Transportation Car   Occupation Retired   Discharge Planning   Type of Residence House   Living Arrangements Alone   Current Services Prior To Admission None   Potential Assistance Needed N/A   DME Ordered? No   Potential Assistance Purchasing Medications No   Type of Home Care Services None   Patient expects to be discharged to: House   Services At/After

## 2024-05-02 NOTE — PROGRESS NOTES
Cardiac Rehab: Spoke with patient regarding referral to cardiac rehab. Patient meets admission criteria based on POBA(5/1/24).  Written information about Cardiac Rehab given and reviewed with patient. Discussed lifestyle modifications to promote cardiac wellness. Patient indicated that he does not wants to the cardiac rehab program at this time. States he just completed Cardiac Rehab at the [facility] and will continue exercising there in the gym. His Cardiologist is Dr. Boggs.      Thank you,  FERNANDO Nixon, RN  Cardiopulmonary Rehabilitation Nurse Liaison  [facility]

## 2024-05-03 VITALS
BODY MASS INDEX: 21.14 KG/M2 | WEIGHT: 119.3 LBS | TEMPERATURE: 98.4 F | RESPIRATION RATE: 18 BRPM | OXYGEN SATURATION: 100 % | HEIGHT: 63 IN | DIASTOLIC BLOOD PRESSURE: 53 MMHG | HEART RATE: 63 BPM | SYSTOLIC BLOOD PRESSURE: 135 MMHG

## 2024-05-03 PROBLEM — I20.0 INTERMEDIATE CORONARY SYNDROME (HCC): Status: RESOLVED | Noted: 2024-05-01 | Resolved: 2024-05-03

## 2024-05-03 PROBLEM — R07.9 CHEST PAIN: Status: RESOLVED | Noted: 2024-04-30 | Resolved: 2024-05-03

## 2024-05-03 PROBLEM — R19.7 DIARRHEA: Status: ACTIVE | Noted: 2024-05-03

## 2024-05-03 PROCEDURE — 6370000000 HC RX 637 (ALT 250 FOR IP): Performed by: INTERNAL MEDICINE

## 2024-05-03 PROCEDURE — 2580000003 HC RX 258: Performed by: INTERNAL MEDICINE

## 2024-05-03 PROCEDURE — G0378 HOSPITAL OBSERVATION PER HR: HCPCS

## 2024-05-03 PROCEDURE — 6370000000 HC RX 637 (ALT 250 FOR IP): Performed by: NURSE PRACTITIONER

## 2024-05-03 PROCEDURE — 99213 OFFICE O/P EST LOW 20 MIN: CPT | Performed by: INTERNAL MEDICINE

## 2024-05-03 RX ORDER — CARVEDILOL 25 MG/1
25 TABLET ORAL 2 TIMES DAILY WITH MEALS
Qty: 60 TABLET | Refills: 3 | Status: SHIPPED | OUTPATIENT
Start: 2024-05-03

## 2024-05-03 RX ORDER — LOPERAMIDE HYDROCHLORIDE 2 MG/1
2 CAPSULE ORAL 4 TIMES DAILY PRN
COMMUNITY
Start: 2024-05-03 | End: 2024-05-13

## 2024-05-03 RX ORDER — LOPERAMIDE HYDROCHLORIDE 2 MG/1
2 CAPSULE ORAL 4 TIMES DAILY PRN
Status: DISCONTINUED | OUTPATIENT
Start: 2024-05-03 | End: 2024-05-03 | Stop reason: HOSPADM

## 2024-05-03 RX ADMIN — SUCRALFATE 1 G: 1 TABLET ORAL at 13:03

## 2024-05-03 RX ADMIN — ROSUVASTATIN CALCIUM 20 MG: 20 TABLET, COATED ORAL at 08:12

## 2024-05-03 RX ADMIN — SUCRALFATE 1 G: 1 TABLET ORAL at 08:12

## 2024-05-03 RX ADMIN — PANTOPRAZOLE SODIUM 40 MG: 40 TABLET, DELAYED RELEASE ORAL at 04:28

## 2024-05-03 RX ADMIN — SODIUM CHLORIDE, PRESERVATIVE FREE 10 ML: 5 INJECTION INTRAVENOUS at 08:14

## 2024-05-03 RX ADMIN — CARVEDILOL 25 MG: 25 TABLET, FILM COATED ORAL at 08:12

## 2024-05-03 RX ADMIN — TICAGRELOR 90 MG: 90 TABLET ORAL at 08:12

## 2024-05-03 RX ADMIN — ASPIRIN 81 MG: 81 TABLET, COATED ORAL at 08:11

## 2024-05-03 ASSESSMENT — PAIN SCALES - GENERAL
PAINLEVEL_OUTOF10: 0

## 2024-05-03 NOTE — CARE COORDINATION
CM met with patient and his sister, Enedina, at discharge. Milestones met. Patient verbalizes agreement to discharge home. Enedina to transport by car.     05/03/24 1332   Discharge Planning   Type of Residence House   Living Arrangements Alone   Current Services Prior To Admission None   Potential Assistance Needed N/A   DME Ordered? No   Potential Assistance Purchasing Medications No  (Brilinta trial card given to patient for new prescription)   Patient expects to be discharged to: House   Services At/After Discharge   Transition of Care Consult (CM Consult) Discharge Planning   Services At/After Discharge None   [place] Resource Information Provided? No   Mode of Transport at Discharge Other (see comment)  (Sister, Enedina, here to transport patient home.)   Confirm Follow Up Transport Family   Condition of Participation: Discharge Planning   The Plan for Transition of Care is related to the following treatment goals: Home with sister's home.   The Patient and/Or Patient Representative agree with the Discharge Plan? Yes

## 2024-05-03 NOTE — DISCHARGE SUMMARY
[facility] Discharge Summary     Patient ID:  Wayne Dorsey  [id number]  88 y.o.  1935    Admit date: 4/30/2024    Discharge date:  5/3/2024    Admitting Physician: César Boyce MD     Discharge Physician: Dr. 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 Cardiac enzymes were done with peak of 321. Patient was admitted to telemetry for continued care. Patient underwent cardiac catheterization by Dr. Steinberg. [facility] results:  -- POBA of pLAD, pCirc, LM. IVUS.  Likely had stent thrombosis of the LAD/Circ stents.  -- Residual diffuse disease  -- R radial approach, TR band for hemostasis    Patient tolerated the procedure well and was taken to the telemetry floor for recovery. The morning of discharge, patient was up feeling well without any complaints of chest pain or shortness of breath. Patient's right radial cath site was clean, dry and intact without hematoma or bruit. Patient's labs were WNL. Patient was seen and examined by Dr. Boyce and determined stable and ready for discharge. Patient was instructed on the importance of medication compliance including taking aspirin and Brilinta everyday without missing a dose.  After receiving drug eluting stents, the patient will remain on dual anti-platelet therapy for 1 year.  For maximized medical therapy for CAD, patient will continue high-intensity statin as well.  The patient will follow up with [facility] Cardiology -- Dr. Woody. Patient has been referred to cardiac rehab.      DISPOSITION: The patient is

## 2024-05-03 NOTE — DISCHARGE SUMMARY
In this split/shared evaluation I performed reviewed the patients's H&P, available images, labs, cultures., discussed case in detail with ACP, performed a medically appropriate history and exam, counseled and educated the patient and/or family member, ordered and/or reviewed medications, tests or procedures, documented information in EMR, independently interpreted images and coordinated care.     Personal Time: 25 minutes -this equates to greater than 50% of total time in patient consultation/care.    Patient seen and examined.  Doing well without chest discomfort, shortness of breath.  Ambulating halls without symptoms.    Blood pressure (!) 135/53, pulse 63, temperature 98.4 °F (36.9 °C), temperature source Oral, resp. rate 18, height 1.6 m (5' 3\"), weight 54.1 kg (119 lb 4.8 oz), SpO2 100 %.  CV-  RRR without murmur, rub, gallop  Lungs- clear bilaterally  Abd- soft, NT, ND  Ext- No edema, right groin clean, dry, no ecchymosis or hematome    Recent Labs     05/02/24  0250      K 3.8   BUN 22      HGB 10.9*   HCT 31.5*   WBC 6.8   *     22  Assessment/Plan:  1) CAD- s/p PCI.   Importance of DAPT daily stressed    2) Continue aggressive risk factor modification in the outpatient setting.  See D/C summary for discharge medications and home therapies.    3) Followup with [facility] in 2 weeks, primary care MD in 4-6 weeks.      ADRIAN CERVANTES MD                       [facility] Discharge Summary     Patient ID:  Wayne Dorsey  [id number]  88 y.o.  1935    Admit date: 4/30/2024    Discharge date:  05/03/24    Admitting Physician: Adrian Cervantes MD     Discharge Physician: DENZEL Pettit - CNP/Dr. 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 If no relief after 1 dose, call 911.  Qty: 25 tablet, Refills: 5      vitamin D (CHOLECALCIFEROL) 25 MCG (1000 UT) TABS tablet Take 1 tablet by mouth daily      glipiZIDE (GLUCOTROL) 5 MG tablet Take 0.5 tablets by mouth daily      traZODone (DESYREL) 50 MG tablet TAKE 1-2 TABLETS BY MOUTH EVERY NIGHT AT BEDTIME AS NEEDED FOR SLEEP           STOP taking these medications       losartan (COZAAR) 100 MG tablet Comments:   Reason for Stopping:         famotidine (PEPCID) 40 MG tablet Comments:   Reason for Stopping:         Doxylamine Succinate, Sleep, (UNISOM PO) Comments:   Reason for Stopping:         ipratropium (ATROVENT) 0.03 % nasal spray Comments:   Reason for Stopping:         carboxymethylcellulose (THERATEARS) 1 % ophthalmic gel Comments:   Reason for Stopping:         clopidogrel (PLAVIX) 75 MG tablet Comments:   Reason for Stopping:              Note some or all of the above medications that were set to stop by the system but the pt was not taking in the outpatient setting.  Limitations in the system make it appear that we have stopped the medications when we have not.          Signed:  DENZEL Pettit - CNP-C  5/3/2024  1:00 PM

## 2024-05-03 NOTE — PLAN OF CARE
Problem: Discharge Planning  Goal: Discharge to home or other facility with appropriate resources  5/3/2024 1302 by Jaylin Griffiths RN  Outcome: Adequate for Discharge  5/3/2024 0919 by Filomena Nunez RN  Outcome: Progressing  Flowsheets (Taken 5/3/2024 0810)  Discharge to home or other facility with appropriate resources:   Identify barriers to discharge with patient and caregiver   Arrange for needed discharge resources and transportation as appropriate   Identify discharge learning needs (meds, wound care, etc)     Problem: Pain  Goal: Verbalizes/displays adequate comfort level or baseline comfort level  5/3/2024 1302 by Jaylin Griffiths RN  Outcome: Adequate for Discharge  5/3/2024 0919 by Filomena Nunez RN  Outcome: Progressing  Flowsheets (Taken 5/3/2024 0810 by Jaylin Griffiths RN)  Verbalizes/displays adequate comfort level or baseline comfort level:   Encourage patient to monitor pain and request assistance   Assess pain using appropriate pain scale   Administer analgesics based on type and severity of pain and evaluate response   Implement non-pharmacological measures as appropriate and evaluate response   Consider cultural and social influences on pain and pain management   Notify Licensed Independent Practitioner if interventions unsuccessful or patient reports new pain     Problem: Safety - Adult  Goal: Free from fall injury  5/3/2024 1302 by Jaylin Griffiths RN  Outcome: Adequate for Discharge  5/3/2024 0919 by Filomena Nunez RN  Outcome: Progressing  Flowsheets (Taken 5/3/2024 0810 by Jaylin Griffiths RN)  Free From Fall Injury: Instruct family/caregiver on patient safety     Problem: ABCDS Injury Assessment  Goal: Absence of physical injury  5/3/2024 1302 by Jaylin Griffiths RN  Outcome: Adequate for Discharge  5/3/2024 0919 by Filomena Nunez RN  Outcome: Progressing

## 2024-05-03 NOTE — PROGRESS NOTES
Discharge instructions were reviewed with patient. An opportunity was given for questions. All medications were reviewed, and information was given on the new medications. Patient verbalized understanding, and has no questions at this time.   Ivs and heart monitor removed.

## 2024-05-06 ENCOUNTER — OFFICE VISIT (OUTPATIENT)
Age: 89
End: 2024-05-06

## 2024-05-06 ENCOUNTER — TELEPHONE (OUTPATIENT)
Age: 89
End: 2024-05-06

## 2024-05-06 VITALS
HEART RATE: 64 BPM | HEIGHT: 63 IN | DIASTOLIC BLOOD PRESSURE: 54 MMHG | SYSTOLIC BLOOD PRESSURE: 132 MMHG | BODY MASS INDEX: 21.72 KG/M2 | WEIGHT: 122.6 LBS

## 2024-05-06 DIAGNOSIS — T82.867D CORONARY STENT THROMBOSIS, SUBSEQUENT ENCOUNTER: ICD-10-CM

## 2024-05-06 DIAGNOSIS — Z78.9 RESISTANCE TO CLOPIDOGREL: ICD-10-CM

## 2024-05-06 DIAGNOSIS — I21.4 NSTEMI (NON-ST ELEVATED MYOCARDIAL INFARCTION) (HCC): Primary | ICD-10-CM

## 2024-05-06 ASSESSMENT — ENCOUNTER SYMPTOMS: CONSTIPATION: 1

## 2024-05-06 NOTE — TELEPHONE ENCOUNTER
Care Transitions Initial Follow Up Call    Call within 2 business days of discharge: Yes     Patient: Wayne Dorsey Patient : 1935 MRN: [id number]    [unfilled]    RARS: Readmission Risk Score: 14.7       Spoke with: Wayne Dorsey    Discharge department/facility: [facility]    Non-face-to-face services provided:  Spoke with pt educated her on Heart healthy diet, Low salt diet. Activities as tolerated. Advised against stooping staining,squatting for 3-5 days and soaking for 7-10 days. Site care and signs of infection. Educated when to call/when to go to ER. Proper use of NTG. Confirmed listed of medications. Blood thinner precaution.    Pt v/u  Follow Up  Future Appointments   Date Time Provider Department Center   2024  1:30 PM Jess Boggs MD [facility] AMB   10/29/2024  8:30 AM Jess Boggs MD [facility] LPN

## 2024-05-06 NOTE — PROGRESS NOTES
prefers to follow up in this fashion.              Return in about 6 months (around 11/6/2024) for ECHO NOW, RESULTS VIA MY CHART.          Thank you for allowing me to participate in this patient's care.  Please call or contact me if there are any questions or concerns regarding the above.      SELAM STODDARD MD  05/06/24  1:17 PM

## 2024-05-07 ENCOUNTER — TELEPHONE (OUTPATIENT)
Age: 89
End: 2024-05-07

## 2024-05-07 NOTE — TELEPHONE ENCOUNTER
MEDICATION REFILL REQUEST      Name of Medication:  Brilinta  Dose:  90 mg  Frequency:  BID  Quantity:  180  Days' supply:  90 with 3 refills      Pharmacy Name/Location:  [facility] [phone]

## 2024-05-13 ENCOUNTER — HOSPITAL ENCOUNTER (INPATIENT)
Age: 89
LOS: 3 days | Discharge: HOME | End: 2024-05-17
Attending: EMERGENCY MEDICINE | Admitting: INTERNAL MEDICINE
Payer: MEDICARE

## 2024-05-13 ENCOUNTER — APPOINTMENT (OUTPATIENT)
Dept: NON INVASIVE DIAGNOSTICS | Age: 89
End: 2024-05-13
Payer: MEDICARE

## 2024-05-13 ENCOUNTER — TELEPHONE (OUTPATIENT)
Age: 89
End: 2024-05-13

## 2024-05-13 ENCOUNTER — APPOINTMENT (OUTPATIENT)
Dept: GENERAL RADIOLOGY | Age: 89
End: 2024-05-13
Payer: MEDICARE

## 2024-05-13 DIAGNOSIS — R07.9 CHEST PAIN: ICD-10-CM

## 2024-05-13 DIAGNOSIS — I25.10 CAD (CORONARY ARTERY DISEASE): ICD-10-CM

## 2024-05-13 DIAGNOSIS — R07.9 CHEST PAIN, UNSPECIFIED TYPE: Primary | ICD-10-CM

## 2024-05-13 PROBLEM — M25.519 ARTHRALGIA OF SHOULDER: Status: ACTIVE | Noted: 2024-05-13

## 2024-05-13 PROBLEM — E11.8 DISORDER ASSOCIATED WITH TYPE 2 DIABETES MELLITUS (HCC): Status: ACTIVE | Noted: 2024-05-13

## 2024-05-13 LAB
ALBUMIN SERPL-MCNC: 4.2 G/DL (ref 3.2–4.6)
ALBUMIN/GLOB SERPL: 1.1 (ref 1–1.9)
ALP SERPL-CCNC: 95 U/L (ref 40–129)
ALT SERPL-CCNC: 21 U/L (ref 12–65)
ANION GAP SERPL CALC-SCNC: 10 MMOL/L (ref 9–18)
AST SERPL-CCNC: 28 U/L (ref 15–37)
BASOPHILS # BLD: 0 K/UL (ref 0–0.2)
BASOPHILS NFR BLD: 1 % (ref 0–2)
BILIRUB SERPL-MCNC: 0.4 MG/DL (ref 0–1.2)
BUN SERPL-MCNC: 25 MG/DL (ref 8–23)
CALCIUM SERPL-MCNC: 9.9 MG/DL (ref 8.8–10.2)
CHLORIDE SERPL-SCNC: 100 MMOL/L (ref 98–107)
CK SERPL-CCNC: 128 U/L (ref 21–215)
CO2 SERPL-SCNC: 24 MMOL/L (ref 20–28)
CREAT SERPL-MCNC: 1.84 MG/DL (ref 0.8–1.3)
DIFFERENTIAL METHOD BLD: ABNORMAL
ECHO BSA: 1.57 M2
ECHO LV EDV A2C: 94 ML
ECHO LV EDV A4C: 74 ML
ECHO LV EDV INDEX A4C: 47 ML/M2
ECHO LV EDV NDEX A2C: 60 ML/M2
ECHO LV EJECTION FRACTION A2C: 70 %
ECHO LV EJECTION FRACTION A4C: 62 %
ECHO LV EJECTION FRACTION BIPLANE: 67 % (ref 55–100)
ECHO LV ESV A2C: 29 ML
ECHO LV ESV A4C: 28 ML
ECHO LV ESV INDEX A2C: 18 ML/M2
ECHO LV ESV INDEX A4C: 18 ML/M2
ECHO LV FRACTIONAL SHORTENING: 27 % (ref 28–44)
ECHO LV INTERNAL DIMENSION DIASTOLE INDEX: 2.36 CM/M2
ECHO LV INTERNAL DIMENSION DIASTOLIC: 3.7 CM (ref 4.2–5.9)
ECHO LV INTERNAL DIMENSION SYSTOLIC INDEX: 1.72 CM/M2
ECHO LV INTERNAL DIMENSION SYSTOLIC: 2.7 CM
ECHO LV IVSD: 0.9 CM (ref 0.6–1)
ECHO LV MASS 2D: 89.5 G (ref 88–224)
ECHO LV MASS INDEX 2D: 57 G/M2 (ref 49–115)
ECHO LV POSTERIOR WALL DIASTOLIC: 0.8 CM (ref 0.6–1)
ECHO LV RELATIVE WALL THICKNESS RATIO: 0.43
EKG ATRIAL RATE: 55 BPM
EKG DIAGNOSIS: NORMAL
EKG P AXIS: 65 DEGREES
EKG P-R INTERVAL: 193 MS
EKG Q-T INTERVAL: 498 MS
EKG QRS DURATION: 118 MS
EKG QTC CALCULATION (BAZETT): 472 MS
EKG R AXIS: -31 DEGREES
EKG T AXIS: 101 DEGREES
EKG VENTRICULAR RATE: 54 BPM
EOSINOPHIL # BLD: 0.3 K/UL (ref 0–0.8)
EOSINOPHIL NFR BLD: 5 % (ref 0.5–7.8)
ERYTHROCYTE [DISTWIDTH] IN BLOOD BY AUTOMATED COUNT: 12.9 % (ref 11.9–14.6)
ERYTHROCYTE [SEDIMENTATION RATE] IN BLOOD: 11 MM/HR
GLOBULIN SER CALC-MCNC: 4 G/DL (ref 2.3–3.5)
GLUCOSE BLD STRIP.AUTO-MCNC: 126 MG/DL (ref 65–100)
GLUCOSE BLD STRIP.AUTO-MCNC: 140 MG/DL (ref 65–100)
GLUCOSE BLD STRIP.AUTO-MCNC: 228 MG/DL (ref 65–100)
GLUCOSE SERPL-MCNC: 223 MG/DL (ref 70–99)
HCT VFR BLD AUTO: 33.8 % (ref 41.1–50.3)
HGB BLD-MCNC: 11.3 G/DL (ref 13.6–17.2)
IMM GRANULOCYTES # BLD AUTO: 0 K/UL (ref 0–0.5)
IMM GRANULOCYTES NFR BLD AUTO: 0 % (ref 0–5)
LYMPHOCYTES # BLD: 1.6 K/UL (ref 0.5–4.6)
LYMPHOCYTES NFR BLD: 34 % (ref 13–44)
MCH RBC QN AUTO: 29.1 PG (ref 26.1–32.9)
MCHC RBC AUTO-ENTMCNC: 33.4 G/DL (ref 31.4–35)
MCV RBC AUTO: 87.1 FL (ref 82–102)
MONOCYTES # BLD: 0.4 K/UL (ref 0.1–1.3)
MONOCYTES NFR BLD: 9 % (ref 4–12)
NEUTS SEG # BLD: 2.4 K/UL (ref 1.7–8.2)
NEUTS SEG NFR BLD: 51 % (ref 43–78)
NRBC # BLD: 0 K/UL (ref 0–0.2)
PLATELET # BLD AUTO: 235 K/UL (ref 150–450)
PMV BLD AUTO: 9.8 FL (ref 9.4–12.3)
POTASSIUM SERPL-SCNC: 4.6 MMOL/L (ref 3.5–5.1)
PROT SERPL-MCNC: 8.2 G/DL (ref 6.3–8.2)
RBC # BLD AUTO: 3.88 M/UL (ref 4.23–5.6)
SERVICE CMNT-IMP: ABNORMAL
SODIUM SERPL-SCNC: 134 MMOL/L (ref 136–145)
TROPONIN T SERPL HS-MCNC: 30 NG/L (ref 0–22)
TROPONIN T SERPL HS-MCNC: 31 NG/L (ref 0–22)
TROPONIN T SERPL HS-MCNC: 36 NG/L (ref 0–22)
TROPONIN T SERPL HS-MCNC: 39 NG/L (ref 0–22)
WBC # BLD AUTO: 4.7 K/UL (ref 4.3–11.1)

## 2024-05-13 PROCEDURE — 93005 ELECTROCARDIOGRAM TRACING: CPT | Performed by: EMERGENCY MEDICINE

## 2024-05-13 PROCEDURE — G0378 HOSPITAL OBSERVATION PER HR: HCPCS

## 2024-05-13 PROCEDURE — 99223 1ST HOSP IP/OBS HIGH 75: CPT | Performed by: INTERNAL MEDICINE

## 2024-05-13 PROCEDURE — 6370000000 HC RX 637 (ALT 250 FOR IP): Performed by: INTERNAL MEDICINE

## 2024-05-13 PROCEDURE — 6370000000 HC RX 637 (ALT 250 FOR IP)

## 2024-05-13 PROCEDURE — 99285 EMERGENCY DEPT VISIT HI MDM: CPT

## 2024-05-13 PROCEDURE — 82550 ASSAY OF CK (CPK): CPT

## 2024-05-13 PROCEDURE — 84484 ASSAY OF TROPONIN QUANT: CPT

## 2024-05-13 PROCEDURE — 6360000004 HC RX CONTRAST MEDICATION: Performed by: INTERNAL MEDICINE

## 2024-05-13 PROCEDURE — 82962 GLUCOSE BLOOD TEST: CPT

## 2024-05-13 PROCEDURE — 2580000003 HC RX 258

## 2024-05-13 PROCEDURE — 85652 RBC SED RATE AUTOMATED: CPT

## 2024-05-13 PROCEDURE — 93010 ELECTROCARDIOGRAM REPORT: CPT | Performed by: INTERNAL MEDICINE

## 2024-05-13 PROCEDURE — C8929 TTE W OR WO FOL WCON,DOPPLER: HCPCS

## 2024-05-13 PROCEDURE — 93308 TTE F-UP OR LMTD: CPT | Performed by: INTERNAL MEDICINE

## 2024-05-13 PROCEDURE — C8924 2D TTE W OR W/O FOL W/CON,FU: HCPCS

## 2024-05-13 PROCEDURE — 80053 COMPREHEN METABOLIC PANEL: CPT

## 2024-05-13 PROCEDURE — 85025 COMPLETE CBC W/AUTO DIFF WBC: CPT

## 2024-05-13 PROCEDURE — 71045 X-RAY EXAM CHEST 1 VIEW: CPT

## 2024-05-13 PROCEDURE — 86140 C-REACTIVE PROTEIN: CPT

## 2024-05-13 PROCEDURE — 2580000003 HC RX 258: Performed by: INTERNAL MEDICINE

## 2024-05-13 RX ORDER — ONDANSETRON 2 MG/ML
4 INJECTION INTRAMUSCULAR; INTRAVENOUS EVERY 6 HOURS PRN
Status: DISCONTINUED | OUTPATIENT
Start: 2024-05-13 | End: 2024-05-15 | Stop reason: SDUPTHER

## 2024-05-13 RX ORDER — ACETAMINOPHEN 325 MG/1
650 TABLET ORAL EVERY 6 HOURS PRN
Status: DISCONTINUED | OUTPATIENT
Start: 2024-05-13 | End: 2024-05-15 | Stop reason: SDUPTHER

## 2024-05-13 RX ORDER — AMLODIPINE BESYLATE 5 MG/1
5 TABLET ORAL DAILY
Status: DISCONTINUED | OUTPATIENT
Start: 2024-05-13 | End: 2024-05-14

## 2024-05-13 RX ORDER — BISACODYL 5 MG/1
5 TABLET, DELAYED RELEASE ORAL DAILY PRN
Status: DISCONTINUED | OUTPATIENT
Start: 2024-05-13 | End: 2024-05-17 | Stop reason: HOSPADM

## 2024-05-13 RX ORDER — SODIUM CHLORIDE 9 MG/ML
INJECTION, SOLUTION INTRAVENOUS PRN
Status: DISCONTINUED | OUTPATIENT
Start: 2024-05-13 | End: 2024-05-17 | Stop reason: HOSPADM

## 2024-05-13 RX ORDER — INSULIN LISPRO 100 [IU]/ML
0-4 INJECTION, SOLUTION INTRAVENOUS; SUBCUTANEOUS
Status: DISCONTINUED | OUTPATIENT
Start: 2024-05-13 | End: 2024-05-17 | Stop reason: HOSPADM

## 2024-05-13 RX ORDER — VITAMIN B COMPLEX
1000 TABLET ORAL DAILY
Status: DISCONTINUED | OUTPATIENT
Start: 2024-05-14 | End: 2024-05-17 | Stop reason: HOSPADM

## 2024-05-13 RX ORDER — COLCHICINE 0.6 MG/1
0.6 TABLET ORAL 2 TIMES DAILY
Status: DISCONTINUED | OUTPATIENT
Start: 2024-05-13 | End: 2024-05-13 | Stop reason: CLARIF

## 2024-05-13 RX ORDER — SODIUM CHLORIDE 9 MG/ML
INJECTION, SOLUTION INTRAVENOUS CONTINUOUS
Status: DISCONTINUED | OUTPATIENT
Start: 2024-05-13 | End: 2024-05-13

## 2024-05-13 RX ORDER — SUCRALFATE 1 G/1
1 TABLET ORAL 4 TIMES DAILY
Status: DISCONTINUED | OUTPATIENT
Start: 2024-05-13 | End: 2024-05-17 | Stop reason: HOSPADM

## 2024-05-13 RX ORDER — PANTOPRAZOLE SODIUM 40 MG/1
40 TABLET, DELAYED RELEASE ORAL
Status: DISCONTINUED | OUTPATIENT
Start: 2024-05-14 | End: 2024-05-17 | Stop reason: HOSPADM

## 2024-05-13 RX ORDER — ASPIRIN 81 MG/1
81 TABLET ORAL DAILY
Status: DISCONTINUED | OUTPATIENT
Start: 2024-05-14 | End: 2024-05-17 | Stop reason: HOSPADM

## 2024-05-13 RX ORDER — TRAZODONE HYDROCHLORIDE 50 MG/1
50 TABLET ORAL NIGHTLY PRN
Status: DISCONTINUED | OUTPATIENT
Start: 2024-05-13 | End: 2024-05-17 | Stop reason: HOSPADM

## 2024-05-13 RX ORDER — INSULIN LISPRO 100 [IU]/ML
0-4 INJECTION, SOLUTION INTRAVENOUS; SUBCUTANEOUS NIGHTLY
Status: DISCONTINUED | OUTPATIENT
Start: 2024-05-13 | End: 2024-05-17 | Stop reason: HOSPADM

## 2024-05-13 RX ORDER — SODIUM CHLORIDE 9 MG/ML
INJECTION, SOLUTION INTRAVENOUS CONTINUOUS
Status: ACTIVE | OUTPATIENT
Start: 2024-05-13 | End: 2024-05-14

## 2024-05-13 RX ORDER — ROSUVASTATIN CALCIUM 20 MG/1
20 TABLET, COATED ORAL NIGHTLY
Status: DISCONTINUED | OUTPATIENT
Start: 2024-05-13 | End: 2024-05-17 | Stop reason: HOSPADM

## 2024-05-13 RX ORDER — SODIUM CHLORIDE 0.9 % (FLUSH) 0.9 %
5-40 SYRINGE (ML) INJECTION EVERY 12 HOURS SCHEDULED
Status: DISCONTINUED | OUTPATIENT
Start: 2024-05-13 | End: 2024-05-17 | Stop reason: HOSPADM

## 2024-05-13 RX ORDER — SODIUM CHLORIDE 0.9 % (FLUSH) 0.9 %
5-40 SYRINGE (ML) INJECTION PRN
Status: DISCONTINUED | OUTPATIENT
Start: 2024-05-13 | End: 2024-05-17 | Stop reason: HOSPADM

## 2024-05-13 RX ORDER — CARVEDILOL 25 MG/1
25 TABLET ORAL 2 TIMES DAILY WITH MEALS
Status: DISCONTINUED | OUTPATIENT
Start: 2024-05-13 | End: 2024-05-17 | Stop reason: HOSPADM

## 2024-05-13 RX ORDER — ONDANSETRON 4 MG/1
4 TABLET, ORALLY DISINTEGRATING ORAL EVERY 8 HOURS PRN
Status: DISCONTINUED | OUTPATIENT
Start: 2024-05-13 | End: 2024-05-17 | Stop reason: HOSPADM

## 2024-05-13 RX ORDER — NITROGLYCERIN 0.4 MG/1
0.4 TABLET SUBLINGUAL EVERY 5 MIN PRN
Status: DISCONTINUED | OUTPATIENT
Start: 2024-05-13 | End: 2024-05-17 | Stop reason: HOSPADM

## 2024-05-13 RX ADMIN — AMLODIPINE BESYLATE 5 MG: 5 TABLET ORAL at 17:04

## 2024-05-13 RX ADMIN — SUCRALFATE 1 G: 1 TABLET ORAL at 21:13

## 2024-05-13 RX ADMIN — SODIUM CHLORIDE, PRESERVATIVE FREE 0.45 ML: 5 INJECTION INTRAVENOUS at 15:50

## 2024-05-13 RX ADMIN — TICAGRELOR 90 MG: 90 TABLET ORAL at 21:13

## 2024-05-13 RX ADMIN — ROSUVASTATIN CALCIUM 20 MG: 20 TABLET, COATED ORAL at 21:13

## 2024-05-13 RX ADMIN — CARVEDILOL 25 MG: 25 TABLET, FILM COATED ORAL at 16:32

## 2024-05-13 RX ADMIN — SODIUM CHLORIDE, PRESERVATIVE FREE 10 ML: 5 INJECTION INTRAVENOUS at 21:23

## 2024-05-13 RX ADMIN — SUCRALFATE 1 G: 1 TABLET ORAL at 16:32

## 2024-05-13 RX ADMIN — SODIUM CHLORIDE: 9 INJECTION, SOLUTION INTRAVENOUS at 16:41

## 2024-05-13 ASSESSMENT — ENCOUNTER SYMPTOMS
BACK PAIN: 1
ORTHOPNEA: 0
FLATUS: 1
EYES NEGATIVE: 1
DIARRHEA: 0
HEMATOCHEZIA: 0
RESPIRATORY NEGATIVE: 1

## 2024-05-13 ASSESSMENT — PAIN DESCRIPTION - DESCRIPTORS: DESCRIPTORS: DISCOMFORT

## 2024-05-13 ASSESSMENT — PAIN DESCRIPTION - LOCATION: LOCATION: CHEST

## 2024-05-13 ASSESSMENT — PAIN DESCRIPTION - ORIENTATION: ORIENTATION: OTHER (COMMENT)

## 2024-05-13 ASSESSMENT — PAIN - FUNCTIONAL ASSESSMENT: PAIN_FUNCTIONAL_ASSESSMENT: 0-10

## 2024-05-13 ASSESSMENT — PAIN SCALES - GENERAL: PAINLEVEL_OUTOF10: 6

## 2024-05-13 NOTE — ED NOTES
RN spoke with Cardiology SAYRA brooks to give amlodipine.     Reyna Beckham, CHARLES  05/13/24 0837

## 2024-05-13 NOTE — H&P
In this split/shared evaluation I performed reviewed the patients's H&P, available images, labs, cultures., discussed case in detail with ACP, performed a medically appropriate history and exam, counseled and educated the patient and/or family member, ordered and/or reviewed medications, tests or procedures, documented information in EMR, independently interpreted images and coordinated care.     Personal Time: 45 minutes -this equates to greater than 50% of total time in patient consultation/care.        Patient seen and examined by me.  Agree with above note by physician extender.  Key findings are: 88-year-old male with longstanding history of cervical stenosis and chronic neck and shoulder pain, hypertension and coronary artery disease.  Patient was admitted 01/2024 with Impella assisted left main/ostial LAD/ostial circumflex stenting.  He was readmitted 04/2024 with acute thrombus noted in both LAD and circumflex status post balloon angioplasty to both vessels.  Patient has struggled since that time with chronic substernal chest pain.  He has been seen at cardiac rehab complaining of daily chest pain which reportedly is worsened over the last 7 days.  He was referred to emergency room today.  On presentation here he still complaining of 6 out of 10 chest pain.  Serial high-sensitivity troponins are negative.  EKG does demonstrate more pronounced anterolateral ST depression and T wave inversion.  Blood pressure remains markedly elevated.    Vitals:    05/13/24 1255 05/13/24 1325 05/13/24 1355 05/13/24 1430   BP: (!) 171/53 (!) 156/58 (!) 170/52 (!) 173/39   Pulse:  57 56 53   Resp: 19 17 17 12   Temp:       TempSrc:       SpO2:    100%   Weight:       Height:            General: Patient is alert and oriented, no apparent distress  HEENT: Pupils equal reactive, oropharynx clear  Chest: Clear to auscultation bilaterally  Cardiovascular: S1-S2 regular with no murmur  Abdomen: Soft positive bowel  Mediastinal contours unremarkable.  Osseous are symmetric.    Impression  Normal chest.      Other Imaging:  XR CHEST PORTABLE    Result Date: 5/13/2024  Normal chest.      Labs:  Recent Labs     05/13/24  1037 05/02/24  0250 05/01/24  0618 04/30/24  2210   WBC 4.7 6.8  --  5.0   HGB 11.3* 10.9*  --  10.1*   HCT 33.8* 31.5*  --  29.8*    141*  --  152   * 138 141 140   K 4.6 3.8 3.9 4.0   CREATININE 1.84* 1.51* 1.54* 1.61*   BUN 25* 22 23 25*   CALCIUM 9.9 9.0 8.8 9.2      Assessment/Plan:     Patient has been seen and examined by Dr. Boyce   and he agrees with the following assessment and plan. Please see their addendum/attestation for further details.:    Principal Problem:    Chest pain  - admit to observation on our service w/ telemetry  - stat echo to eval for thrombus, pericarditis & wall motion abnormalities  - npo at midnight w/ probable LHC tomorrow. Gentle IVF due to elevated creatinine from baseline (Baseline creatinine has been 1.4-1.7)  & to avoid contrast nephropathy. Daily bmp to monitor renal function.   - pericarditis labs. Will consider colchicine for cp.  - continue BB, ASA, Brillinta, prn nitro, statin for CAD. h/o intolerance to Ranexa (see Dr. Reese notes from summer 2023). H/o plavix nonresponder.  - routine labs  - cardiac diet  - prn EKG/ prn trop/ prn nitro for acute change in CP  Active Problems:    GERD (gastroesophageal reflux disease)  - GI procedures on hold indefinitely unless they are willing to perform on DAPT (per Dr. Woody's note)  Sucralfate, probiotic,     Hypertension  - probable cause of eye pressure & headaches  - ordered orthostatic VS in light of Dr. Woody's note stating   - continue home BB. Don't want to titrate up in setting of bradycardia  - jardiance  - likely will titrate bp meds while inpatient    Hyperlipemia  - continue home statin  - cardiac diet    Type 2 diabetes mellitus with stage 3b chronic kidney disease, without long-term current use of

## 2024-05-13 NOTE — ED TRIAGE NOTES
Pt arrives to ER via EMS, Patient coming from cardiac rehab - had LAD, angioplasy, and circumflex on 5/1/24 at [facility]. Patient c/o chest pain. Pain is substernal 6/10, radiates to the back. Nothing makes it better or worse.   Patient was seen for this by outpatient MD, but pain is continuing to get worse over the last 2 weeks.    1 nitro given prior to EMS arrival (cardiac RN) - nitro did not change pain.   Nitro given at 0912    EMS gave 324mg aspirin    162/70  HR in 50s (sinus barry)    20 left forearm   97% on RA

## 2024-05-13 NOTE — ED NOTES
Patient family concerned that patient is diabetic and has not eaten yet today. RN to check blood sugar.    RN spoke with MD Guzman - patient cannot eat until cardiology evals.     Reyna Beckham, RN  05/13/24 1743

## 2024-05-13 NOTE — TELEPHONE ENCOUNTER
Masha with [facility] called stating the patient is experiencing the following :    Active CP  Took Nitro

## 2024-05-13 NOTE — ED NOTES
Pt states that at previous hospital discharge, amlodipine and losartan were discontinued on his discharge paperwork. Requesting for clarification prior to taking. RN will reach out to admitting MD.     Reyna Beckham, CHARLES  05/13/24 7202

## 2024-05-13 NOTE — TELEPHONE ENCOUNTER
Masha with [facility] states pt is having ongoing chest discomfort after nitro, I told her to call 911 and send him to ED.    Pt of Dr. Woody had a LHC on 5/1/24 with PCI

## 2024-05-13 NOTE — CARE COORDINATION
Chart review complete, CM  met with pt and sister at bedside, pt found laying on stretcher alert and oriented x4, pt states he lives alone in own 1 level home. Pt was at Cardiac Rehab this am and began having chest pain. Pt is awaiting room assignment for admission. NO anticipated dc needs noted at this time.  Demographics, insurance and PCP confirmed with pt.    CM staff will remain available to assist as needed with dc needs.       05/13/24 3560   Service Assessment   Patient Orientation Alert and Oriented   Cognition Alert   History Provided By Patient   Primary Caregiver Self   Accompanied By/Relationship sister Enedina   Support Systems Family Members;Children   Patient's Healthcare Decision Maker is: Legal Next of Kin   PCP Verified by CM Yes   Last Visit to PCP Within last 3 months   Prior Functional Level Independent in ADLs/IADLs   Current Functional Level Independent in ADLs/IADLs   Can patient return to prior living arrangement Yes   Ability to make needs known: Good   Family able to assist with home care needs: Yes   Would you like for me to discuss the discharge plan with any other family members/significant others, and if so, who? Yes   Financial Resources Medicare   Community Resources None   CM/SW Referral Other (see comment)  (na)   Social/Functional History   Lives With Alone   Type of Home House   Home Layout One level   Home Access Stairs to enter with rails   Entrance Stairs - Number of Steps 1   Bathroom Shower/Tub Walk-in shower   Bathroom Toilet Standard   Bathroom Equipment None   Bathroom Accessibility Accessible   Home Equipment None   Receives Help From Family  (prn)   ADL Assistance Independent   Homemaking Assistance Independent   Ambulation Assistance Independent   Transfer Assistance Independent   Active  Yes   Occupation Retired   Discharge Planning   Type of Residence House   Living Arrangements Alone   Current Services Prior To Admission None   Potential Assistance Needed N/A

## 2024-05-13 NOTE — ED PROVIDER NOTES
Emergency Department Provider Note       PCP: Nathan Thorne MD   Age: 88 y.o.   Sex: male     DISPOSITION Admitted 05/13/2024 02:06:28 PM       ICD-10-CM    1. Chest pain, unspecified type  R07.9 Echo (TTE) complete (PRN contrast/bubble/strain/3D)     Echo (TTE) complete (PRN contrast/bubble/strain/3D)          Medical Decision Making     Reviewed patient's chart.  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  Had disease that was not amenable to stenting as left medical therapy although notes states that should he have further symptoms he may need to have that reinvestigated.  His last follow-up appointment with Dr. Woody said that he was having some dyspnea with exertion and that he may need a follow-up echo if that continues.  Given all these comments and his chest discomfort will ask cardiology to see him.     1 or more acute illnesses that pose a threat to life or bodily function.   Prescription drug management performed.  Discussion with external consultants.    I independently ordered and reviewed each unique test.  I reviewed external records: provider visit note from outside specialist.       My Independent EKG Interpretation: sinus rhythm, no evidence of arrhythmia      ST Segments:Nonspecific ST segments - NO STEMI   Rate: 54  Twave inversion anteriorly different than 4/30  The patient was admitted and I have discussed patient management with the admitting provider.          History     Presents with complaint of 10 days of chest discomfort.  Patient states he also has cervical radiculopathy and cannot tell to place Pain from his cervical stenosis or from his heart.  States he went to cardiac rehab today and could not exercise because he was having discomfort so they sent him here.  Patient had recent stent placement.  He reports feeling short of breath and lightheaded with it.  He states he does not feel stable on his feet.  He denies any nausea or vomiting.    The history is provided by  K/UL    Eosinophils Absolute 0.3 0.0 - 0.8 K/UL    Basophils Absolute 0.0 0.0 - 0.2 K/UL    Immature Granulocytes Absolute 0.0 0.0 - 0.5 K/UL   Comprehensive Metabolic Panel w/ Reflex to MG   Result Value Ref Range    Sodium 134 (L) 136 - 145 mmol/L    Potassium 4.6 3.5 - 5.1 mmol/L    Chloride 100 98 - 107 mmol/L    CO2 24 20 - 28 mmol/L    Anion Gap 10 9 - 18 mmol/L    Glucose 223 (H) 70 - 99 mg/dL    BUN 25 (H) 8 - 23 MG/DL    Creatinine 1.84 (H) 0.80 - 1.30 MG/DL    Est, Glom Filt Rate 35 (L) >60 ml/min/1.73m2    Calcium 9.9 8.8 - 10.2 MG/DL    Total Bilirubin 0.4 0.0 - 1.2 MG/DL    ALT 21 12 - 65 U/L    AST 28 15 - 37 U/L    Alk Phosphatase 95 40 - 129 U/L    Total Protein 8.2 6.3 - 8.2 g/dL    Albumin 4.2 3.2 - 4.6 g/dL    Globulin 4.0 (H) 2.3 - 3.5 g/dL    Albumin/Globulin Ratio 1.1 1.0 - 1.9     Troponin now then q90 min for 2 occurances   Result Value Ref Range    Troponin T 36.0 (H) 0 - 22 ng/L   Troponin   Result Value Ref Range    Troponin T 39.0 (H) 0 - 22 ng/L   POCT Glucose   Result Value Ref Range    POC Glucose 126 (H) 65 - 100 mg/dL    Performed by: Lucio    EKG 12 Lead   Result Value Ref Range    Ventricular Rate 54 BPM    Atrial Rate 55 BPM    P-R Interval 193 ms    QRS Duration 118 ms    Q-T Interval 498 ms    QTc Calculation (Bazett) 472 ms    P Axis 65 degrees    R Axis -31 degrees    T Axis 101 degrees    Diagnosis       Sinus rhythm  Left ventricular hypertrophy  Anterior Q waves, possibly due to LVH  Abnrm T, probable ischemia, anterolateral lds           XR CHEST PORTABLE   Final Result   Normal chest.                   No results for input(s): \"COVID19\" in the last 72 hours.     Voice dictation software was used during the making of this note.  This software is not perfect and grammatical and other typographical errors may be present.  This note has not been completely proofread for errors.     Margot Guzman MD  05/13/24 2080

## 2024-05-13 NOTE — ACP (ADVANCE CARE PLANNING)
Advance Care Planning   Healthcare Decision Maker:    Primary Decision Maker: DorseyRoosevelt - Child - [phone]    Secondary Decision Maker: Enedina Dorsey - Brother/Sister - [phone]    Click here to complete Healthcare Decision Makers including selection of the Healthcare Decision Maker Relationship (ie \"Primary\").  Today we documented Decision Maker(s) consistent with Legal Next of Kin hierarchy.

## 2024-05-14 LAB
ANION GAP SERPL CALC-SCNC: 11 MMOL/L (ref 9–18)
BUN SERPL-MCNC: 25 MG/DL (ref 8–23)
CALCIUM SERPL-MCNC: 9.4 MG/DL (ref 8.8–10.2)
CHLORIDE SERPL-SCNC: 107 MMOL/L (ref 98–107)
CHOLEST SERPL-MCNC: 114 MG/DL (ref 0–200)
CO2 SERPL-SCNC: 22 MMOL/L (ref 20–28)
CREAT SERPL-MCNC: 1.77 MG/DL (ref 0.8–1.3)
CRP SERPL HS-MCNC: 0.8 MG/L (ref 0–3)
ERYTHROCYTE [DISTWIDTH] IN BLOOD BY AUTOMATED COUNT: 13.1 % (ref 11.9–14.6)
EST. AVERAGE GLUCOSE BLD GHB EST-MCNC: 159 MG/DL
GLUCOSE BLD STRIP.AUTO-MCNC: 135 MG/DL (ref 65–100)
GLUCOSE BLD STRIP.AUTO-MCNC: 140 MG/DL (ref 65–100)
GLUCOSE BLD STRIP.AUTO-MCNC: 155 MG/DL (ref 65–100)
GLUCOSE BLD STRIP.AUTO-MCNC: 205 MG/DL (ref 65–100)
GLUCOSE SERPL-MCNC: 144 MG/DL (ref 70–99)
HBA1C MFR BLD: 7.2 % (ref 0–5.6)
HCT VFR BLD AUTO: 31.4 % (ref 41.1–50.3)
HDLC SERPL-MCNC: 53 MG/DL (ref 40–60)
HDLC SERPL: 2.2 (ref 0–5)
HGB BLD-MCNC: 10.7 G/DL (ref 13.6–17.2)
LDLC SERPL CALC-MCNC: 46 MG/DL (ref 0–100)
MAGNESIUM SERPL-MCNC: 2.2 MG/DL (ref 1.8–2.4)
MCH RBC QN AUTO: 29.2 PG (ref 26.1–32.9)
MCHC RBC AUTO-ENTMCNC: 34.1 G/DL (ref 31.4–35)
MCV RBC AUTO: 85.6 FL (ref 82–102)
NRBC # BLD: 0 K/UL (ref 0–0.2)
PLATELET # BLD AUTO: 231 K/UL (ref 150–450)
PMV BLD AUTO: 9.8 FL (ref 9.4–12.3)
POTASSIUM SERPL-SCNC: 4 MMOL/L (ref 3.5–5.1)
RBC # BLD AUTO: 3.67 M/UL (ref 4.23–5.6)
SERVICE CMNT-IMP: ABNORMAL
SODIUM SERPL-SCNC: 140 MMOL/L (ref 136–145)
TRIGL SERPL-MCNC: 77 MG/DL (ref 0–150)
TROPONIN T SERPL HS-MCNC: 33 NG/L (ref 0–22)
VLDLC SERPL CALC-MCNC: 15 MG/DL (ref 6–23)
WBC # BLD AUTO: 5.8 K/UL (ref 4.3–11.1)

## 2024-05-14 PROCEDURE — 6370000000 HC RX 637 (ALT 250 FOR IP): Performed by: INTERNAL MEDICINE

## 2024-05-14 PROCEDURE — 2060000000 HC ICU INTERMEDIATE R&B

## 2024-05-14 PROCEDURE — 99232 SBSQ HOSP IP/OBS MODERATE 35: CPT | Performed by: INTERNAL MEDICINE

## 2024-05-14 PROCEDURE — 6370000000 HC RX 637 (ALT 250 FOR IP)

## 2024-05-14 PROCEDURE — 82962 GLUCOSE BLOOD TEST: CPT

## 2024-05-14 PROCEDURE — 86141 C-REACTIVE PROTEIN HS: CPT

## 2024-05-14 PROCEDURE — 84484 ASSAY OF TROPONIN QUANT: CPT

## 2024-05-14 PROCEDURE — 83735 ASSAY OF MAGNESIUM: CPT

## 2024-05-14 PROCEDURE — 2580000003 HC RX 258

## 2024-05-14 PROCEDURE — 85027 COMPLETE CBC AUTOMATED: CPT

## 2024-05-14 PROCEDURE — G0378 HOSPITAL OBSERVATION PER HR: HCPCS

## 2024-05-14 PROCEDURE — 2580000003 HC RX 258: Performed by: INTERNAL MEDICINE

## 2024-05-14 PROCEDURE — 36415 COLL VENOUS BLD VENIPUNCTURE: CPT

## 2024-05-14 PROCEDURE — 80048 BASIC METABOLIC PNL TOTAL CA: CPT

## 2024-05-14 PROCEDURE — 83036 HEMOGLOBIN GLYCOSYLATED A1C: CPT

## 2024-05-14 PROCEDURE — 80061 LIPID PANEL: CPT

## 2024-05-14 RX ORDER — SODIUM CHLORIDE 9 MG/ML
INJECTION, SOLUTION INTRAVENOUS CONTINUOUS
Status: DISCONTINUED | OUTPATIENT
Start: 2024-05-14 | End: 2024-05-17 | Stop reason: HOSPADM

## 2024-05-14 RX ORDER — COLCHICINE 0.6 MG/1
0.3 TABLET ORAL DAILY
Status: DISCONTINUED | OUTPATIENT
Start: 2024-05-14 | End: 2024-05-17 | Stop reason: HOSPADM

## 2024-05-14 RX ORDER — AMLODIPINE BESYLATE 10 MG/1
10 TABLET ORAL DAILY
Status: DISCONTINUED | OUTPATIENT
Start: 2024-05-15 | End: 2024-05-17 | Stop reason: HOSPADM

## 2024-05-14 RX ADMIN — TICAGRELOR 90 MG: 90 TABLET ORAL at 17:53

## 2024-05-14 RX ADMIN — ROSUVASTATIN CALCIUM 20 MG: 20 TABLET, COATED ORAL at 20:02

## 2024-05-14 RX ADMIN — SODIUM CHLORIDE, PRESERVATIVE FREE 10 ML: 5 INJECTION INTRAVENOUS at 08:29

## 2024-05-14 RX ADMIN — VITAMIN D, TAB 1000IU (100/BT) 1000 UNITS: 25 TAB at 08:28

## 2024-05-14 RX ADMIN — SUCRALFATE 1 G: 1 TABLET ORAL at 17:53

## 2024-05-14 RX ADMIN — CARVEDILOL 25 MG: 25 TABLET, FILM COATED ORAL at 08:28

## 2024-05-14 RX ADMIN — COLCHICINE 0.3 MG: 0.6 TABLET, FILM COATED ORAL at 10:03

## 2024-05-14 RX ADMIN — SODIUM CHLORIDE: 9 INJECTION, SOLUTION INTRAVENOUS at 13:59

## 2024-05-14 RX ADMIN — SUCRALFATE 1 G: 1 TABLET ORAL at 08:28

## 2024-05-14 RX ADMIN — PANTOPRAZOLE SODIUM 40 MG: 40 TABLET, DELAYED RELEASE ORAL at 06:12

## 2024-05-14 RX ADMIN — SODIUM CHLORIDE: 9 INJECTION, SOLUTION INTRAVENOUS at 10:04

## 2024-05-14 RX ADMIN — EMPAGLIFLOZIN 10 MG: 10 TABLET, FILM COATED ORAL at 08:28

## 2024-05-14 RX ADMIN — CARVEDILOL 25 MG: 25 TABLET, FILM COATED ORAL at 17:53

## 2024-05-14 RX ADMIN — TICAGRELOR 90 MG: 90 TABLET ORAL at 06:12

## 2024-05-14 RX ADMIN — ASPIRIN 81 MG: 81 TABLET, COATED ORAL at 08:28

## 2024-05-14 RX ADMIN — AMLODIPINE BESYLATE 5 MG: 5 TABLET ORAL at 08:28

## 2024-05-14 RX ADMIN — SODIUM CHLORIDE, PRESERVATIVE FREE 10 ML: 5 INJECTION INTRAVENOUS at 20:06

## 2024-05-14 RX ADMIN — SUCRALFATE 1 G: 1 TABLET ORAL at 20:02

## 2024-05-14 RX ADMIN — SUCRALFATE 1 G: 1 TABLET ORAL at 12:15

## 2024-05-14 NOTE — MANAGEMENT PLAN
Called patient's son Roosevelt Dorsey at [phone], per patient's request, for update. Patient's son mainly had questions about goals of next C and what problem was treated last heart cath. All questions answered. mutual goal of addressing barriers to patient getting back to his baseline high activity for his age (or as close as possible to it) discussed.

## 2024-05-14 NOTE — ED NOTES
TRANSFER - OUT REPORT:    Verbal report given to Mattie SOTO on Wayne Dorsey  being transferred to [facility] for routine progression of patient care       Report consisted of patient's Situation, Background, Assessment and   Recommendations(SBAR).     Information from the following report(s) Nurse Handoff Report was reviewed with the receiving nurse.    [place] Fall Assessment:    Presents to emergency department  because of falls (Syncope, seizure, or loss of consciousness): No  Age > 70: Yes  Altered Mental Status, Intoxication with alcohol or substance confusion (Disorientation, impaired judgment, poor safety awaremess, or inability to follow instructions): No  Impaired Mobility: Ambulates or transfers with assistive devices or assistance; Unable to ambulate or transer.: No  Nursing Judgement: Yes          Lines:   Peripheral IV 05/13/24 Left Forearm (Active)   Site Assessment Clean, dry & intact 05/13/24 1031   Line Status Brisk blood return 05/13/24 1031       Peripheral IV 05/13/24 Right Antecubital (Active)   Site Assessment Clean, dry & intact 05/13/24 1051   Line Status Brisk blood return 05/13/24 1051        Opportunity for questions and clarification was provided.      Patient transported with:  Registered Nurse           Oleksandr Alexis RN  05/13/24 2022

## 2024-05-14 NOTE — PLAN OF CARE
Problem: Discharge Planning  Goal: Discharge to home or other facility with appropriate resources  5/14/2024 1100 by Dima Case RN  Outcome: Progressing  5/13/2024 2221 by Mattie Carroll RN  Outcome: Progressing  5/13/2024 2133 by Mattie Carroll RN  Outcome: Progressing     Problem: Pain  Goal: Verbalizes/displays adequate comfort level or baseline comfort level  5/14/2024 1100 by Dima Case RN  Outcome: Progressing  5/13/2024 2221 by Mattie Carroll RN  Outcome: Progressing  5/13/2024 2133 by Mattie Carroll RN  Outcome: Progressing     Problem: Safety - Adult  Goal: Free from fall injury  5/14/2024 1100 by Dima Case RN  Outcome: Progressing  5/13/2024 2221 by Mattie Carroll RN  Outcome: Progressing     Problem: ABCDS Injury Assessment  Goal: Absence of physical injury  5/14/2024 1100 by Dima Case RN  Outcome: Progressing  5/13/2024 2221 by Mattie Carroll RN  Outcome: Progressing

## 2024-05-14 NOTE — PROGRESS NOTES
[facility] CARDIOLOGY PROGRESS NOTE           5/14/2024 9:21 AM    Admit Date: 5/13/2024      Subjective:   Patient reports no additional chest pain.  He still feels weak at times.    ROS:  Cardiovascular:  As noted above    Objective:      Vitals:    05/13/24 2047 05/14/24 0030 05/14/24 0420 05/14/24 0750   BP: (!) 174/66 (!) 124/55 (!) 130/56 (!) 148/58   Pulse: 57 58 59 58   Resp: 17 17 17 16   Temp: 98.4 °F (36.9 °C) 98.1 °F (36.7 °C) 98.2 °F (36.8 °C) 98.8 °F (37.1 °C)   TempSrc: Oral Oral  Oral   SpO2: 99% 95% 96% 100%   Weight:  42 kg (92 lb 11.2 oz)     Height:           Physical Exam:  General-No Acute Distress  Neck- supple, no JVD  CV- regular rate and rhythm no MRG  Lung- clear bilaterally  Abd- soft, nontender, nondistended  Ext- no edema bilaterally.  Skin- warm and dry    Data Review:   Recent Labs     05/13/24  1037 05/14/24  0514   * 140   K 4.6 4.0   MG  --  2.2   BUN 25* 25*   WBC 4.7 5.8   HGB 11.3* 10.7*   HCT 33.8* 31.4*    231   CHOL  --  114   HDL  --  53       Assessment/Plan:     Principal Problem:    Chest pain  Plan: Patient with persistent chest pressure and chest discomfort since he was discharged 2 weeks ago.  High-sensitivity troponins are negative.  Will check CRP and start colchicine.  Echocardiogram confirms normal left ventricular systolic function no pericardial effusion or wall motion abnormalities.  This is complicated as patient was just discharged after acute stent thrombosis involving left main LAD and circumflex.  He has significant residual right coronary artery disease that was not treated at the time of the event.  Consideration will be given to PCI RCA prior to discharge.  Active Problems:    GERD (gastroesophageal reflux disease)  Plan: Chronic and stable    Hypertension  Plan: Blood pressure is improved and we will titrate amlodipine to 10 mg daily    Hyperlipemia  Plan: Continue rosuvastatin 20 mg daily    Type 2 diabetes mellitus

## 2024-05-14 NOTE — PROGRESS NOTES
stated)  Ideal Body Weight (Kg) (Calculated): 56 kg (124 lbs),    BMI Category Underweight (BMI less than 22) age over 65  Estimated Daily Nutrient Needs:  Energy (kcal/day): 6767-5803 (30-35 kcal/kg) (Kcal/kg Weight used: 45 kg Current  Protein (g/day): 54-68  (1.2-1.5 g/kg) Weight Used: (Current) 45 kg  Fluid (ml/day):   (1 ml/kcal)    Nutrition Diagnosis:   Inadequate oral intake related to  (poor appetite) as evidenced by weight loss (pt reports barriers to po as above)  Nutrition Interventions:   Food and/or Nutrient Delivery: Continue Current Diet, Start Oral Nutrition Supplement     Coordination of Nutrition Care: Continue to monitor while inpatient      Goals:      Active Goal: PO intake 50% or greater, by next RD assessment       Nutrition Monitoring and Evaluation:      Food/Nutrient Intake Outcomes: Food and Nutrient Intake, Supplement Intake  Physical Signs/Symptoms Outcomes: Weight, Meal Time Behavior    Discharge Planning:    Too soon to determine    Jaylin Holloway RD

## 2024-05-14 NOTE — PROGRESS NOTES
4 Eyes Skin Assessment     NAME:  Wayne Dorsey  YOB: 1935  MEDICAL RECORD NUMBER:  [id number]    The patient is being assessed for  Admission    I agree that at least one RN has performed a thorough Head to Toe Skin Assessment on the patient. ALL assessment sites listed below have been assessed.      Areas assessed by both nurses:    Head, Face, Ears, Shoulders, Back, Chest, Arms, Elbows, Hands, Sacrum. Buttock, Coccyx, Ischium, and Legs. Feet and Heels        Does the Patient have a Wound? No noted wound(s)       Rm Prevention initiated by RN: Yes  Wound Care Orders initiated by RN: No    Pressure Injury (Stage 3,4, Unstageable, DTI, NWPT, and Complex wounds) if present, place Wound referral order by RN under : No    New Ostomies, if present place, Ostomy referral order under : No     Nurse 1 eSignature: Electronically signed by CODY CABRERA RN on 5/14/24 at 6:24 AM EDT    **SHARE this note so that the co-signing nurse can place an eSignature**    Nurse 2 eSignature: Electronically signed by Srinivasan sEteban RN on 5/14/24 at 6:25 AM EDT

## 2024-05-15 LAB
ANION GAP SERPL CALC-SCNC: 11 MMOL/L (ref 9–18)
BUN SERPL-MCNC: 25 MG/DL (ref 8–23)
CALCIUM SERPL-MCNC: 9 MG/DL (ref 8.8–10.2)
CHLORIDE SERPL-SCNC: 108 MMOL/L (ref 98–107)
CO2 SERPL-SCNC: 21 MMOL/L (ref 20–28)
CREAT SERPL-MCNC: 1.64 MG/DL (ref 0.8–1.3)
CRP SERPL-MCNC: 1 MG/L (ref 0–10)
ECHO BSA: 1.57 M2
EKG ATRIAL RATE: 51 BPM
EKG DIAGNOSIS: NORMAL
EKG P AXIS: 75 DEGREES
EKG P-R INTERVAL: 190 MS
EKG Q-T INTERVAL: 504 MS
EKG QRS DURATION: 110 MS
EKG QTC CALCULATION (BAZETT): 464 MS
EKG R AXIS: -4 DEGREES
EKG T AXIS: 107 DEGREES
EKG VENTRICULAR RATE: 51 BPM
ERYTHROCYTE [DISTWIDTH] IN BLOOD BY AUTOMATED COUNT: 13.2 % (ref 11.9–14.6)
GLUCOSE BLD STRIP.AUTO-MCNC: 110 MG/DL (ref 65–100)
GLUCOSE BLD STRIP.AUTO-MCNC: 119 MG/DL (ref 65–100)
GLUCOSE BLD STRIP.AUTO-MCNC: 134 MG/DL (ref 65–100)
GLUCOSE BLD STRIP.AUTO-MCNC: 146 MG/DL (ref 65–100)
GLUCOSE SERPL-MCNC: 118 MG/DL (ref 70–99)
HCT VFR BLD AUTO: 30 % (ref 41.1–50.3)
HGB BLD-MCNC: 10 G/DL (ref 13.6–17.2)
MAGNESIUM SERPL-MCNC: 1.9 MG/DL (ref 1.8–2.4)
MCH RBC QN AUTO: 28.7 PG (ref 26.1–32.9)
MCHC RBC AUTO-ENTMCNC: 33.3 G/DL (ref 31.4–35)
MCV RBC AUTO: 86.2 FL (ref 82–102)
NRBC # BLD: 0 K/UL (ref 0–0.2)
PLATELET # BLD AUTO: 221 K/UL (ref 150–450)
PMV BLD AUTO: 9.7 FL (ref 9.4–12.3)
POTASSIUM SERPL-SCNC: 3.8 MMOL/L (ref 3.5–5.1)
RBC # BLD AUTO: 3.48 M/UL (ref 4.23–5.6)
SERVICE CMNT-IMP: ABNORMAL
SODIUM SERPL-SCNC: 140 MMOL/L (ref 136–145)
WBC # BLD AUTO: 6.1 K/UL (ref 4.3–11.1)

## 2024-05-15 PROCEDURE — 2709999900 HC NON-CHARGEABLE SUPPLY: Performed by: INTERNAL MEDICINE

## 2024-05-15 PROCEDURE — 36415 COLL VENOUS BLD VENIPUNCTURE: CPT

## 2024-05-15 PROCEDURE — 99232 SBSQ HOSP IP/OBS MODERATE 35: CPT | Performed by: INTERNAL MEDICINE

## 2024-05-15 PROCEDURE — B2111ZZ FLUOROSCOPY OF MULTIPLE CORONARY ARTERIES USING LOW OSMOLAR CONTRAST: ICD-10-PCS | Performed by: INTERNAL MEDICINE

## 2024-05-15 PROCEDURE — 2580000003 HC RX 258: Performed by: INTERNAL MEDICINE

## 2024-05-15 PROCEDURE — C1761 HC CATH TRANSLUM INTRAVASCULAR LITHOTRIPSY CORONARY: HCPCS | Performed by: INTERNAL MEDICINE

## 2024-05-15 PROCEDURE — C1769 GUIDE WIRE: HCPCS | Performed by: INTERNAL MEDICINE

## 2024-05-15 PROCEDURE — 6370000000 HC RX 637 (ALT 250 FOR IP): Performed by: INTERNAL MEDICINE

## 2024-05-15 PROCEDURE — 83735 ASSAY OF MAGNESIUM: CPT

## 2024-05-15 PROCEDURE — C9603 PERC D-E COR STENT ATHER BR: HCPCS | Performed by: INTERNAL MEDICINE

## 2024-05-15 PROCEDURE — 2060000000 HC ICU INTERMEDIATE R&B

## 2024-05-15 PROCEDURE — 93005 ELECTROCARDIOGRAM TRACING: CPT | Performed by: INTERNAL MEDICINE

## 2024-05-15 PROCEDURE — 85027 COMPLETE CBC AUTOMATED: CPT

## 2024-05-15 PROCEDURE — 99152 MOD SED SAME PHYS/QHP 5/>YRS: CPT | Performed by: INTERNAL MEDICINE

## 2024-05-15 PROCEDURE — C1874 STENT, COATED/COV W/DEL SYS: HCPCS | Performed by: INTERNAL MEDICINE

## 2024-05-15 PROCEDURE — 6370000000 HC RX 637 (ALT 250 FOR IP)

## 2024-05-15 PROCEDURE — 6360000002 HC RX W HCPCS: Performed by: INTERNAL MEDICINE

## 2024-05-15 PROCEDURE — C1887 CATHETER, GUIDING: HCPCS | Performed by: INTERNAL MEDICINE

## 2024-05-15 PROCEDURE — 93010 ELECTROCARDIOGRAM REPORT: CPT | Performed by: INTERNAL MEDICINE

## 2024-05-15 PROCEDURE — 6360000004 HC RX CONTRAST MEDICATION: Performed by: INTERNAL MEDICINE

## 2024-05-15 PROCEDURE — 2500000003 HC RX 250 WO HCPCS: Performed by: INTERNAL MEDICINE

## 2024-05-15 PROCEDURE — C9602 PERC D-E COR STENT ATHER S: HCPCS | Performed by: INTERNAL MEDICINE

## 2024-05-15 PROCEDURE — 93454 CORONARY ARTERY ANGIO S&I: CPT | Performed by: INTERNAL MEDICINE

## 2024-05-15 PROCEDURE — 82962 GLUCOSE BLOOD TEST: CPT

## 2024-05-15 PROCEDURE — 4A023N7 MEASUREMENT OF CARDIAC SAMPLING AND PRESSURE, LEFT HEART, PERCUTANEOUS APPROACH: ICD-10-PCS | Performed by: INTERNAL MEDICINE

## 2024-05-15 PROCEDURE — B2151ZZ FLUOROSCOPY OF LEFT HEART USING LOW OSMOLAR CONTRAST: ICD-10-PCS | Performed by: INTERNAL MEDICINE

## 2024-05-15 PROCEDURE — 92972 PERQ TRLUML CORONRY LITHOTRP: CPT | Performed by: INTERNAL MEDICINE

## 2024-05-15 PROCEDURE — 99153 MOD SED SAME PHYS/QHP EA: CPT | Performed by: INTERNAL MEDICINE

## 2024-05-15 PROCEDURE — 80048 BASIC METABOLIC PNL TOTAL CA: CPT

## 2024-05-15 PROCEDURE — C1894 INTRO/SHEATH, NON-LASER: HCPCS | Performed by: INTERNAL MEDICINE

## 2024-05-15 PROCEDURE — 2580000003 HC RX 258

## 2024-05-15 PROCEDURE — 02F03ZZ FRAGMENTATION IN CORONARY ARTERY, ONE ARTERY, PERCUTANEOUS APPROACH: ICD-10-PCS | Performed by: INTERNAL MEDICINE

## 2024-05-15 PROCEDURE — 92928 PRQ TCAT PLMT NTRAC ST 1 LES: CPT | Performed by: INTERNAL MEDICINE

## 2024-05-15 PROCEDURE — 027035Z DILATION OF CORONARY ARTERY, ONE ARTERY WITH TWO DRUG-ELUTING INTRALUMINAL DEVICES, PERCUTANEOUS APPROACH: ICD-10-PCS | Performed by: INTERNAL MEDICINE

## 2024-05-15 DEVICE — STENT ONYXNG30030UX ONYX 3.00X30RX
Type: IMPLANTABLE DEVICE | Status: FUNCTIONAL
Brand: ONYX FRONTIER™

## 2024-05-15 DEVICE — STENT ONYXNG35015UX ONYX 3.50X15RX
Type: IMPLANTABLE DEVICE | Status: FUNCTIONAL
Brand: ONYX FRONTIER™

## 2024-05-15 RX ORDER — LIDOCAINE HYDROCHLORIDE 10 MG/ML
INJECTION, SOLUTION INFILTRATION; PERINEURAL PRN
Status: DISCONTINUED | OUTPATIENT
Start: 2024-05-15 | End: 2024-05-15 | Stop reason: HOSPADM

## 2024-05-15 RX ORDER — SODIUM CHLORIDE 0.9 % (FLUSH) 0.9 %
5-40 SYRINGE (ML) INJECTION EVERY 12 HOURS SCHEDULED
Status: DISCONTINUED | OUTPATIENT
Start: 2024-05-15 | End: 2024-05-17 | Stop reason: HOSPADM

## 2024-05-15 RX ORDER — ONDANSETRON 2 MG/ML
4 INJECTION INTRAMUSCULAR; INTRAVENOUS EVERY 6 HOURS PRN
Status: DISCONTINUED | OUTPATIENT
Start: 2024-05-15 | End: 2024-05-17 | Stop reason: HOSPADM

## 2024-05-15 RX ORDER — MORPHINE SULFATE 2 MG/ML
2 INJECTION, SOLUTION INTRAMUSCULAR; INTRAVENOUS
Status: DISCONTINUED | OUTPATIENT
Start: 2024-05-15 | End: 2024-05-17 | Stop reason: HOSPADM

## 2024-05-15 RX ORDER — HEPARIN SODIUM 200 [USP'U]/100ML
INJECTION, SOLUTION INTRAVENOUS CONTINUOUS PRN
Status: COMPLETED | OUTPATIENT
Start: 2024-05-15 | End: 2024-05-15

## 2024-05-15 RX ORDER — MORPHINE SULFATE 4 MG/ML
4 INJECTION, SOLUTION INTRAMUSCULAR; INTRAVENOUS
Status: DISCONTINUED | OUTPATIENT
Start: 2024-05-15 | End: 2024-05-17 | Stop reason: HOSPADM

## 2024-05-15 RX ORDER — MIDAZOLAM HYDROCHLORIDE 1 MG/ML
INJECTION INTRAMUSCULAR; INTRAVENOUS PRN
Status: DISCONTINUED | OUTPATIENT
Start: 2024-05-15 | End: 2024-05-15 | Stop reason: HOSPADM

## 2024-05-15 RX ORDER — SODIUM CHLORIDE 0.9 % (FLUSH) 0.9 %
5-40 SYRINGE (ML) INJECTION PRN
Status: DISCONTINUED | OUTPATIENT
Start: 2024-05-15 | End: 2024-05-17 | Stop reason: HOSPADM

## 2024-05-15 RX ORDER — HYDROCODONE BITARTRATE AND ACETAMINOPHEN 5; 325 MG/1; MG/1
1 TABLET ORAL EVERY 4 HOURS PRN
Status: DISCONTINUED | OUTPATIENT
Start: 2024-05-15 | End: 2024-05-17 | Stop reason: HOSPADM

## 2024-05-15 RX ORDER — SODIUM CHLORIDE 9 MG/ML
INJECTION, SOLUTION INTRAVENOUS PRN
Status: ACTIVE | OUTPATIENT
Start: 2024-05-15 | End: 2024-05-15

## 2024-05-15 RX ORDER — ACETAMINOPHEN 325 MG/1
650 TABLET ORAL EVERY 4 HOURS PRN
Status: DISCONTINUED | OUTPATIENT
Start: 2024-05-15 | End: 2024-05-17 | Stop reason: HOSPADM

## 2024-05-15 RX ORDER — BIVALIRUDIN 250 MG/5ML
INJECTION, POWDER, LYOPHILIZED, FOR SOLUTION INTRAVENOUS PRN
Status: DISCONTINUED | OUTPATIENT
Start: 2024-05-15 | End: 2024-05-15 | Stop reason: HOSPADM

## 2024-05-15 RX ORDER — HYDROCODONE BITARTRATE AND ACETAMINOPHEN 5; 325 MG/1; MG/1
2 TABLET ORAL EVERY 4 HOURS PRN
Status: DISCONTINUED | OUTPATIENT
Start: 2024-05-15 | End: 2024-05-17 | Stop reason: HOSPADM

## 2024-05-15 RX ADMIN — SODIUM CHLORIDE, PRESERVATIVE FREE 10 ML: 5 INJECTION INTRAVENOUS at 09:00

## 2024-05-15 RX ADMIN — SODIUM CHLORIDE: 9 INJECTION, SOLUTION INTRAVENOUS at 19:43

## 2024-05-15 RX ADMIN — TICAGRELOR 90 MG: 90 TABLET ORAL at 17:20

## 2024-05-15 RX ADMIN — VITAMIN D, TAB 1000IU (100/BT) 1000 UNITS: 25 TAB at 08:47

## 2024-05-15 RX ADMIN — SODIUM CHLORIDE: 9 INJECTION, SOLUTION INTRAVENOUS at 01:00

## 2024-05-15 RX ADMIN — AMLODIPINE BESYLATE 10 MG: 10 TABLET ORAL at 08:48

## 2024-05-15 RX ADMIN — SUCRALFATE 1 G: 1 TABLET ORAL at 17:20

## 2024-05-15 RX ADMIN — ASPIRIN 81 MG: 81 TABLET, COATED ORAL at 08:47

## 2024-05-15 RX ADMIN — ROSUVASTATIN CALCIUM 20 MG: 20 TABLET, COATED ORAL at 20:26

## 2024-05-15 RX ADMIN — TICAGRELOR 90 MG: 90 TABLET ORAL at 06:03

## 2024-05-15 RX ADMIN — COLCHICINE 0.3 MG: 0.6 TABLET, FILM COATED ORAL at 08:48

## 2024-05-15 RX ADMIN — SODIUM CHLORIDE, PRESERVATIVE FREE 10 ML: 5 INJECTION INTRAVENOUS at 20:26

## 2024-05-15 RX ADMIN — PANTOPRAZOLE SODIUM 40 MG: 40 TABLET, DELAYED RELEASE ORAL at 06:03

## 2024-05-15 RX ADMIN — EMPAGLIFLOZIN 10 MG: 10 TABLET, FILM COATED ORAL at 08:48

## 2024-05-15 RX ADMIN — SUCRALFATE 1 G: 1 TABLET ORAL at 08:52

## 2024-05-15 RX ADMIN — CARVEDILOL 25 MG: 25 TABLET, FILM COATED ORAL at 08:51

## 2024-05-15 RX ADMIN — SUCRALFATE 1 G: 1 TABLET ORAL at 20:26

## 2024-05-15 RX ADMIN — SUCRALFATE 1 G: 1 TABLET ORAL at 13:26

## 2024-05-15 RX ADMIN — CARVEDILOL 25 MG: 25 TABLET, FILM COATED ORAL at 17:21

## 2024-05-15 ASSESSMENT — PAIN SCALES - GENERAL
PAINLEVEL_OUTOF10: 3
PAINLEVEL_OUTOF10: 0

## 2024-05-15 ASSESSMENT — PAIN DESCRIPTION - LOCATION
LOCATION: CHEST
LOCATION: SHOULDER

## 2024-05-15 ASSESSMENT — PAIN DESCRIPTION - PAIN TYPE: TYPE: CHRONIC PAIN

## 2024-05-15 ASSESSMENT — PAIN - FUNCTIONAL ASSESSMENT: PAIN_FUNCTIONAL_ASSESSMENT: ACTIVITIES ARE NOT PREVENTED

## 2024-05-15 ASSESSMENT — PAIN DESCRIPTION - DESCRIPTORS: DESCRIPTORS: SORE

## 2024-05-15 ASSESSMENT — PAIN DESCRIPTION - ORIENTATION: ORIENTATION: RIGHT

## 2024-05-15 NOTE — PROGRESS NOTES
PCI w/ Dr. Boyce  Shockwave to RCA  Stent to ostRCA x1  Stent to dRCA x1  TR band to R radial @ 12mL  No s/sxs of bleeding or hematoma to R radial access site    Heparin 2000 units IC  Versed 2mg IV  Fentanyl 50mcg IV  Angiomax stopped @ 1258  Brilinta 90mg PO    TRANSFER - OUT REPORT:    Verbal report given to CHARLES Castañeda on Wayne Dorsey  being transferred to Telemetry for routine progression of patient care       Report consisted of patient's Situation, Background, Assessment and   Recommendations(SBAR).     Information from the following report(s) Nurse Handoff Report and MAR was reviewed with the receiving nurse.    Opportunity for questions and clarification was provided.      Patient transported with:  Registered Nurse and Tech

## 2024-05-15 NOTE — CARE COORDINATION
CM reviewed pt chart for continued stay.  Visited with pt and his sister at bedside.  Pt anticipates that he will have a cardiac procedure tomorrow.  Pt denies any concerns.  Will continue to follow pt plan of care and assist with any supportive care needs that arise as appropriate.

## 2024-05-15 NOTE — PLAN OF CARE

## 2024-05-15 NOTE — PROGRESS NOTES
[facility] CARDIOLOGY PROGRESS NOTE           5/15/2024 8:06 AM    Admit Date: 5/13/2024      Subjective:   Patient reports no additional chest pain.  He still feels weak at times.  Most of patient's pain in his left shoulder and lateral discomfort that is reproducible on exam.    ROS:  Cardiovascular:  As noted above    Objective:      Vitals:    05/14/24 1931 05/14/24 2330 05/15/24 0222 05/15/24 0733   BP: (!) 151/59 (!) 125/46 (!) 129/52 (!) 149/60   Pulse: 62 51 52 57   Resp: 18 17 17 19   Temp: 98.4 °F (36.9 °C) 98.2 °F (36.8 °C) 98.4 °F (36.9 °C) 99 °F (37.2 °C)   TempSrc: Oral Oral Oral Oral   SpO2: 99% 96% 96% 98%   Weight:   44.7 kg (98 lb 9.6 oz)    Height:           Physical Exam:  General-No Acute Distress  Neck- supple, no JVD  CV- regular rate and rhythm no MRG  Lung- clear bilaterally  Abd- soft, nontender, nondistended  Ext- no edema bilaterally.  Skin- warm and dry    Data Review:   Recent Labs     05/14/24  0514 05/15/24  0406    140   K 4.0 3.8   MG 2.2 1.9   BUN 25* 25*   WBC 5.8 6.1   HGB 10.7* 10.0*   HCT 31.4* 30.0*    221   CHOL 114  --    HDL 53  --          Assessment/Plan:     Principal Problem:    Chest pain  Plan: Patient with persistent chest pressure and chest discomfort since he was discharged 2 weeks ago.  High-sensitivity troponins are negative.  Start colchicine.  Echocardiogram confirms normal left ventricular systolic function no pericardial effusion or wall motion abnormalities.  This is complicated as patient was just discharged after acute stent thrombosis involving left main LAD and circumflex.  He has significant residual right coronary artery disease that was not treated at the time of the event.  Consideration will be given to PCI RCA prior to discharge.  Active Problems:    GERD (gastroesophageal reflux disease)  Plan: Chronic and stable    Hypertension  Plan: Blood pressure is improved and we will titrate amlodipine to 10 mg daily

## 2024-05-16 PROBLEM — E43 SEVERE PROTEIN-CALORIE MALNUTRITION (HCC): Status: ACTIVE | Noted: 2024-05-16

## 2024-05-16 LAB
ANION GAP SERPL CALC-SCNC: 10 MMOL/L (ref 9–18)
BUN SERPL-MCNC: 26 MG/DL (ref 8–23)
CALCIUM SERPL-MCNC: 9 MG/DL (ref 8.8–10.2)
CHLORIDE SERPL-SCNC: 105 MMOL/L (ref 98–107)
CO2 SERPL-SCNC: 19 MMOL/L (ref 20–28)
CREAT SERPL-MCNC: 1.55 MG/DL (ref 0.8–1.3)
ERYTHROCYTE [DISTWIDTH] IN BLOOD BY AUTOMATED COUNT: 12.9 % (ref 11.9–14.6)
GLUCOSE BLD STRIP.AUTO-MCNC: 145 MG/DL (ref 65–100)
GLUCOSE BLD STRIP.AUTO-MCNC: 156 MG/DL (ref 65–100)
GLUCOSE BLD STRIP.AUTO-MCNC: 185 MG/DL (ref 65–100)
GLUCOSE BLD STRIP.AUTO-MCNC: 189 MG/DL (ref 65–100)
GLUCOSE SERPL-MCNC: 156 MG/DL (ref 70–99)
HCT VFR BLD AUTO: 30.3 % (ref 41.1–50.3)
HGB BLD-MCNC: 10.5 G/DL (ref 13.6–17.2)
MAGNESIUM SERPL-MCNC: 1.9 MG/DL (ref 1.8–2.4)
MCH RBC QN AUTO: 29.4 PG (ref 26.1–32.9)
MCHC RBC AUTO-ENTMCNC: 34.7 G/DL (ref 31.4–35)
MCV RBC AUTO: 84.9 FL (ref 82–102)
NRBC # BLD: 0 K/UL (ref 0–0.2)
PLATELET # BLD AUTO: 213 K/UL (ref 150–450)
PMV BLD AUTO: 9.7 FL (ref 9.4–12.3)
POTASSIUM SERPL-SCNC: 4.1 MMOL/L (ref 3.5–5.1)
RBC # BLD AUTO: 3.57 M/UL (ref 4.23–5.6)
SERVICE CMNT-IMP: ABNORMAL
SODIUM SERPL-SCNC: 134 MMOL/L (ref 136–145)
WBC # BLD AUTO: 5.4 K/UL (ref 4.3–11.1)

## 2024-05-16 PROCEDURE — 87046 STOOL CULTR AEROBIC BACT EA: CPT

## 2024-05-16 PROCEDURE — 85027 COMPLETE CBC AUTOMATED: CPT

## 2024-05-16 PROCEDURE — 87899 AGENT NOS ASSAY W/OPTIC: CPT

## 2024-05-16 PROCEDURE — 87427 SHIGA-LIKE TOXIN AG IA: CPT

## 2024-05-16 PROCEDURE — 6370000000 HC RX 637 (ALT 250 FOR IP)

## 2024-05-16 PROCEDURE — 2580000003 HC RX 258

## 2024-05-16 PROCEDURE — 80048 BASIC METABOLIC PNL TOTAL CA: CPT

## 2024-05-16 PROCEDURE — 2060000000 HC ICU INTERMEDIATE R&B

## 2024-05-16 PROCEDURE — 87177 OVA AND PARASITES SMEARS: CPT

## 2024-05-16 PROCEDURE — 6370000000 HC RX 637 (ALT 250 FOR IP): Performed by: INTERNAL MEDICINE

## 2024-05-16 PROCEDURE — 87209 SMEAR COMPLEX STAIN: CPT

## 2024-05-16 PROCEDURE — 99232 SBSQ HOSP IP/OBS MODERATE 35: CPT | Performed by: INTERNAL MEDICINE

## 2024-05-16 PROCEDURE — 82962 GLUCOSE BLOOD TEST: CPT

## 2024-05-16 PROCEDURE — 36415 COLL VENOUS BLD VENIPUNCTURE: CPT

## 2024-05-16 PROCEDURE — 2580000003 HC RX 258: Performed by: INTERNAL MEDICINE

## 2024-05-16 PROCEDURE — 87045 FECES CULTURE AEROBIC BACT: CPT

## 2024-05-16 PROCEDURE — 83735 ASSAY OF MAGNESIUM: CPT

## 2024-05-16 RX ORDER — LOPERAMIDE HYDROCHLORIDE 2 MG/1
2 CAPSULE ORAL 4 TIMES DAILY PRN
Status: DISCONTINUED | OUTPATIENT
Start: 2024-05-16 | End: 2024-05-17 | Stop reason: HOSPADM

## 2024-05-16 RX ADMIN — SUCRALFATE 1 G: 1 TABLET ORAL at 19:55

## 2024-05-16 RX ADMIN — PANTOPRAZOLE SODIUM 40 MG: 40 TABLET, DELAYED RELEASE ORAL at 04:29

## 2024-05-16 RX ADMIN — ASPIRIN 81 MG: 81 TABLET, COATED ORAL at 08:10

## 2024-05-16 RX ADMIN — SUCRALFATE 1 G: 1 TABLET ORAL at 12:36

## 2024-05-16 RX ADMIN — ROSUVASTATIN CALCIUM 20 MG: 20 TABLET, COATED ORAL at 19:55

## 2024-05-16 RX ADMIN — COLCHICINE 0.3 MG: 0.6 TABLET, FILM COATED ORAL at 08:11

## 2024-05-16 RX ADMIN — SODIUM CHLORIDE, PRESERVATIVE FREE 10 ML: 5 INJECTION INTRAVENOUS at 19:55

## 2024-05-16 RX ADMIN — SODIUM CHLORIDE: 9 INJECTION, SOLUTION INTRAVENOUS at 09:44

## 2024-05-16 RX ADMIN — EMPAGLIFLOZIN 10 MG: 10 TABLET, FILM COATED ORAL at 08:11

## 2024-05-16 RX ADMIN — AMLODIPINE BESYLATE 10 MG: 10 TABLET ORAL at 08:11

## 2024-05-16 RX ADMIN — CARVEDILOL 25 MG: 25 TABLET, FILM COATED ORAL at 17:50

## 2024-05-16 RX ADMIN — CARVEDILOL 25 MG: 25 TABLET, FILM COATED ORAL at 08:11

## 2024-05-16 RX ADMIN — SODIUM CHLORIDE, PRESERVATIVE FREE 10 ML: 5 INJECTION INTRAVENOUS at 08:15

## 2024-05-16 RX ADMIN — TICAGRELOR 90 MG: 90 TABLET ORAL at 17:51

## 2024-05-16 RX ADMIN — SUCRALFATE 1 G: 1 TABLET ORAL at 08:11

## 2024-05-16 RX ADMIN — VITAMIN D, TAB 1000IU (100/BT) 1000 UNITS: 25 TAB at 08:10

## 2024-05-16 RX ADMIN — SUCRALFATE 1 G: 1 TABLET ORAL at 17:50

## 2024-05-16 RX ADMIN — TICAGRELOR 90 MG: 90 TABLET ORAL at 04:30

## 2024-05-16 ASSESSMENT — PAIN SCALES - GENERAL
PAINLEVEL_OUTOF10: 0

## 2024-05-16 NOTE — PROGRESS NOTES
Comprehensive Nutrition Assessment    Type and Reason for Visit: Reassess  Malnutrition Screening Tool: Malnutrition Screen  Have you recently lost weight without trying?: 2 to 13 pounds (1 point)  Have you been eating poorly because of a decreased appetite?: Yes (1 point)  Malnutrition Screening Tool Score: 2    Nutrition Recommendations/Plan:   Meals and Snacks:  Diet: Liberalize diet to Regular    (Appropriate based on patient age, limited PO intake, and malnourished status)  Nutrition Supplement Therapy:  Medical food supplement therapy:  Continue Glucerna Shake three times per day (this provides 220 kcal and 10 grams protein per bottle) - strawberry   Nutrition Education  Educated on increasing calories/protein  Learners: Patient and Family  Readiness: Acceptance  Method: Explanation and Handout  Response: Verbalizes Understanding     Malnutrition Assessment:  Malnutrition Status: Severe malnutrition  Context: Acute Illness  Findings of clinical characteristics of malnutrition:   Energy Intake:  Mild decrease in energy intake (Comment) (decreased PO intake for ~ 2 weeks)  Weight Loss:  Greater than 5% over 1 month (8% in ~ 1 month (4/2 to 5/16))     Body Fat Loss:  Mild body fat loss Triceps, Orbital, Buccal region   Muscle Mass Loss:  Moderate muscle mass loss (Mild to moderate) Temples (temporalis), Clavicles (pectoralis & deltoids), Hand (interosseous), Thigh (quadriceps), Calf (gastrocnemius)    Nutrition Assessment:  Nutrition History: Pt reports decreased intake since his last admission ~2 weeks ago. He stated he has not had an appetite.   5/16: SAYRA Flores reports patient is very active at home up until the past several weeks/months. Reports he walks daily and loves to garden.      Do You Have Any Cultural, Jewish, or Ethnic Food Preferences?: No   Weight History: Pt endorses wt loss in the past few weeks. He stated he started taking Jardiance a few weeks ago. Per EMR wt hx review of internal medicine  of acute illness or injury related to inadequate protein-energy intake as evidenced by Criteria as identified in malnutrition assessment    Nutrition Interventions:   Food and/or Nutrient Delivery: Modify Current Diet, Continue Oral Nutrition Supplement     Coordination of Nutrition Care: Continue to monitor while inpatient      Goals:   Previous Goal Met: Goal(s) Achieved  Active Goal: Meet at least 75% of estimated needs, by next RD assessment       Nutrition Monitoring and Evaluation:      Food/Nutrient Intake Outcomes: Food and Nutrient Intake, Supplement Intake  Physical Signs/Symptoms Outcomes: Weight, Meal Time Behavior    Discharge Planning:    Continue Oral Nutrition Supplement    Carmen Davis RD

## 2024-05-16 NOTE — PLAN OF CARE
Problem: Discharge Planning  Goal: Discharge to home or other facility with appropriate resources  5/15/2024 2353 by Melida Rizo RN  Outcome: Progressing  5/15/2024 1242 by Garry Fernandez RN  Outcome: Progressing     Problem: Pain  Goal: Verbalizes/displays adequate comfort level or baseline comfort level  5/15/2024 2353 by Melida Rizo RN  Outcome: Progressing  5/15/2024 1242 by Garry Fernandez RN  Outcome: Progressing     Problem: Safety - Adult  Goal: Free from fall injury  5/15/2024 2353 by Melida Rizo RN  Outcome: Progressing  5/15/2024 1242 by Garry Fernandez RN  Outcome: Progressing     Problem: ABCDS Injury Assessment  Goal: Absence of physical injury  5/15/2024 2353 by Melida Rizo RN  Outcome: Progressing  5/15/2024 1242 by Garry Fernandez RN  Outcome: Progressing     Problem: Chronic Conditions and Co-morbidities  Goal: Patient's chronic conditions and co-morbidity symptoms are monitored and maintained or improved  5/15/2024 2353 by Melida Rizo RN  Outcome: Progressing  5/15/2024 1242 by Garry Fernandez RN  Outcome: Progressing

## 2024-05-16 NOTE — PROGRESS NOTES
[facility] CARDIOLOGY PROGRESS NOTE           5/16/2024 8:55 AM    Admit Date: 5/13/2024         Subjective: Percutaneous intervention to the right coronary artery by Dr. Boyce yesterday.  Feels his chest pain has resolved.  Today he is complaining of diarrhea.    ROS:  Cardiovascular:  As noted above    Objective:      Vitals:    05/16/24 0337 05/16/24 0430 05/16/24 0748 05/16/24 0810   BP: 122/60  (!) 123/55 (!) 129/55   Pulse: 61  58 60   Resp: 18  16    Temp: 98.1 °F (36.7 °C)  97.5 °F (36.4 °C)    TempSrc: Oral  Oral    SpO2: 98%  99%    Weight:  47.6 kg (105 lb)     Height:           On telemetry:sr      Physical Exam:  General: Well Developed, Well Nourished, No Acute Distress, Alert & Oriented x 3, Appropriate mood  Neck: supple, no JVD  Heart: S1S2 with RRR without murmurs or gallops  Lungs: Clear throughout auscultation bilaterally without adventitious sounds  Abd: soft, nontender, nondistended, with good bowel sounds  Ext: no edema bilaterally  Skin: warm and dry      Data Review:   Recent Labs     05/14/24  0514 05/15/24  0406 05/16/24  0401    140 134*   K 4.0 3.8 4.1   MG 2.2 1.9 1.9   BUN 25* 25* 26*   WBC 5.8 6.1 5.4   HGB 10.7* 10.0* 10.5*   HCT 31.4* 30.0* 30.3*    221 213   CHOL 114  --   --    HDL 53  --   --        No results for input(s): \"TNIPOC\" in the last 72 hours.    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 *    A/P  1) CAD/CP -improved now post percutaneous intervention of the right coronary.  Stents in the left system were patent.  Will continue dual antiplatelet therapy.  2) HTN- controlled on current therapy  3)  Diarrhea - will consult hospitalist team, check for c.diff.    Calvin Steinberg MD  5/16/2024 8:55 AM

## 2024-05-16 NOTE — CARE COORDINATION
CM reviewed clinical notes.S/P LHC with PCI to RCA. Per Cardiac Rehab note, patient is agreeable to participate in the program at discharge. Hospitalist consulted.Enteric Contact isolation.    CM will follow for transition of care needs.  LOS 2.

## 2024-05-16 NOTE — PROGRESS NOTES
Cardiac Rehab: Spoke with patient regarding referral to cardiac rehab. Patient meets admission criteria based on PCI (4/15/24).  Written information about Cardiac Rehab given and reviewed with patient. Discussed lifestyle modifications to promote cardiac wellness. He has participated in the Cardiac Rehab program at the [facility] Cardiac [facility] several times.  Patient indicated that he wants to repeat the cardiac rehab program at the [facility] Cardiac Re[facility]. We will forward his referral for Cardiac Rehab to the [facility] Cardiac Rehab program as requested. His Cardiologist is Dr. Boggs.

## 2024-05-16 NOTE — CONSULTS
[facility] Hospitalist Consult   Admit Date:  2024 10:23 AM   Name:  Wayne Dorsey   Age:  88 y.o.  Sex:  male  :  1935   MRN:  [id number]   Room:  [facility]/    Presenting/Chief Complaint: Chest Pain    Reason(s) for Admission: Chest pain [R07.9]  Chest pain, unspecified type [R07.9]     Hospitalists consulted by César Boyce MD for: diarrhea    History of Presenting Illness:   Wayne Dorsey is a 88 y.o. male with history of cervical stenosis chronic neck and shoulder pain, coronary artery disease, hypertension who presented with chest discomfort over 10-day period.  Patient went to cardiac rehab and was not able to exercise due to discomfort so was sent to the emergency room to be evaluated.  Patient admitted 2024 with Impella assisted left main/ostial LAD/ostial circumflex stenting.  Readmitted 2024 with acute thrombus noted in both LAD and circumflex status post balloon angioplasty to both vessels.  Patient has struggled since that time.  Troponins negative.  Cardiology primary consulted hospital service due to patient's diarrhea.      Assessment & Plan:   Chest pain  -Management per primary  - Improved status post percutaneous intervention to right coronary  - Continue dual antiplatelet therapy per cardiology    Hypertension  - Currently controlled on current regimen    Diarrhea  -cont IVF  -f/u morning BMP  -stool culture  -ova + parasites  -c. Diff pending  -Once C. Diff confirmed negative, can start patient on immodium    PT/OT evals and PPD needed/ordered?  Defer to primary team  Diet:  ADULT DIET; Regular; Low Fat/Low Chol/High Fiber/2 gm Na  ADULT ORAL NUTRITION SUPPLEMENT; Breakfast, Lunch, Dinner; Diabetic Oral Supplement  VTE prophylaxis: Defer to primary team  Code status: Full Code    Non-peripheral Lines and Tubes (if present):          Telemetry (if present):  Cardiac/Telemetry Monitor On: Bedside monitor in use        Hospital Problems:  Principal Problem:    Chest

## 2024-05-17 VITALS
HEART RATE: 56 BPM | RESPIRATION RATE: 17 BRPM | OXYGEN SATURATION: 98 % | SYSTOLIC BLOOD PRESSURE: 129 MMHG | TEMPERATURE: 98 F | HEIGHT: 63 IN | WEIGHT: 113.6 LBS | DIASTOLIC BLOOD PRESSURE: 52 MMHG | BODY MASS INDEX: 20.13 KG/M2

## 2024-05-17 LAB
ANION GAP SERPL CALC-SCNC: 11 MMOL/L (ref 9–18)
BUN SERPL-MCNC: 25 MG/DL (ref 8–23)
CALCIUM SERPL-MCNC: 8.9 MG/DL (ref 8.8–10.2)
CHLORIDE SERPL-SCNC: 109 MMOL/L (ref 98–107)
CO2 SERPL-SCNC: 19 MMOL/L (ref 20–28)
CREAT SERPL-MCNC: 1.48 MG/DL (ref 0.8–1.3)
GLUCOSE BLD STRIP.AUTO-MCNC: 146 MG/DL (ref 65–100)
GLUCOSE BLD STRIP.AUTO-MCNC: 178 MG/DL (ref 65–100)
GLUCOSE SERPL-MCNC: 141 MG/DL (ref 70–99)
MAGNESIUM SERPL-MCNC: 1.9 MG/DL (ref 1.8–2.4)
POTASSIUM SERPL-SCNC: 3.8 MMOL/L (ref 3.5–5.1)
SERVICE CMNT-IMP: ABNORMAL
SERVICE CMNT-IMP: ABNORMAL
SODIUM SERPL-SCNC: 138 MMOL/L (ref 136–145)

## 2024-05-17 PROCEDURE — 83735 ASSAY OF MAGNESIUM: CPT

## 2024-05-17 PROCEDURE — 99238 HOSP IP/OBS DSCHRG MGMT 30/<: CPT | Performed by: INTERNAL MEDICINE

## 2024-05-17 PROCEDURE — 80048 BASIC METABOLIC PNL TOTAL CA: CPT

## 2024-05-17 PROCEDURE — 36415 COLL VENOUS BLD VENIPUNCTURE: CPT

## 2024-05-17 PROCEDURE — 2580000003 HC RX 258: Performed by: INTERNAL MEDICINE

## 2024-05-17 PROCEDURE — 6360000002 HC RX W HCPCS: Performed by: INTERNAL MEDICINE

## 2024-05-17 PROCEDURE — 2580000003 HC RX 258

## 2024-05-17 PROCEDURE — 82962 GLUCOSE BLOOD TEST: CPT

## 2024-05-17 PROCEDURE — 6370000000 HC RX 637 (ALT 250 FOR IP): Performed by: INTERNAL MEDICINE

## 2024-05-17 PROCEDURE — 6370000000 HC RX 637 (ALT 250 FOR IP)

## 2024-05-17 RX ORDER — AMLODIPINE BESYLATE 10 MG/1
10 TABLET ORAL DAILY
Qty: 30 TABLET | Refills: 3 | Status: SHIPPED | OUTPATIENT
Start: 2024-05-18

## 2024-05-17 RX ADMIN — CARVEDILOL 25 MG: 25 TABLET, FILM COATED ORAL at 09:11

## 2024-05-17 RX ADMIN — COLCHICINE 0.3 MG: 0.6 TABLET, FILM COATED ORAL at 09:10

## 2024-05-17 RX ADMIN — EMPAGLIFLOZIN 10 MG: 10 TABLET, FILM COATED ORAL at 09:12

## 2024-05-17 RX ADMIN — MORPHINE SULFATE 2 MG: 2 INJECTION, SOLUTION INTRAMUSCULAR; INTRAVENOUS at 02:40

## 2024-05-17 RX ADMIN — TICAGRELOR 90 MG: 90 TABLET ORAL at 04:09

## 2024-05-17 RX ADMIN — AMLODIPINE BESYLATE 10 MG: 10 TABLET ORAL at 09:12

## 2024-05-17 RX ADMIN — SODIUM CHLORIDE: 9 INJECTION, SOLUTION INTRAVENOUS at 00:26

## 2024-05-17 RX ADMIN — VITAMIN D, TAB 1000IU (100/BT) 1000 UNITS: 25 TAB at 09:11

## 2024-05-17 RX ADMIN — SODIUM CHLORIDE, PRESERVATIVE FREE 10 ML: 5 INJECTION INTRAVENOUS at 09:13

## 2024-05-17 RX ADMIN — ASPIRIN 81 MG: 81 TABLET, COATED ORAL at 09:11

## 2024-05-17 RX ADMIN — PANTOPRAZOLE SODIUM 40 MG: 40 TABLET, DELAYED RELEASE ORAL at 04:09

## 2024-05-17 RX ADMIN — SUCRALFATE 1 G: 1 TABLET ORAL at 13:31

## 2024-05-17 RX ADMIN — SUCRALFATE 1 G: 1 TABLET ORAL at 09:11

## 2024-05-17 ASSESSMENT — PAIN DESCRIPTION - LOCATION: LOCATION: CHEST

## 2024-05-17 ASSESSMENT — PAIN DESCRIPTION - ONSET: ONSET: SUDDEN

## 2024-05-17 ASSESSMENT — PAIN DESCRIPTION - FREQUENCY: FREQUENCY: INTERMITTENT

## 2024-05-17 ASSESSMENT — PAIN SCALES - GENERAL
PAINLEVEL_OUTOF10: 6
PAINLEVEL_OUTOF10: 0

## 2024-05-17 ASSESSMENT — PAIN - FUNCTIONAL ASSESSMENT: PAIN_FUNCTIONAL_ASSESSMENT: ACTIVITIES ARE NOT PREVENTED

## 2024-05-17 ASSESSMENT — PAIN DESCRIPTION - ORIENTATION: ORIENTATION: OTHER (COMMENT)

## 2024-05-17 ASSESSMENT — PAIN DESCRIPTION - PAIN TYPE: TYPE: ACUTE PAIN;CHRONIC PAIN

## 2024-05-17 ASSESSMENT — PAIN DESCRIPTION - DESCRIPTORS: DESCRIPTORS: DISCOMFORT;HEAVINESS

## 2024-05-17 NOTE — PROGRESS NOTES
Pt's diarrhea has resolved from prn imodium.    Pt's diarrhea likely caused by colchicine.    Stool culture negative for salmonella or shigella.    Stool consistency not suggestive of C.diff.      Pt is hemodynamically stable.      I will sign off.

## 2024-05-17 NOTE — CARE COORDINATION
Discharge order placed. CM met with patient and his sister to discuss transition of care needs. CM informed patient of the supportive care of an RN.  Sister reports she is staying with patient for a few days. Patient plans to continue Cardiac Rehab. Declines supportive care.  Sister transporting home by car.     05/17/24 1321   Services At/After Discharge   Transition of Care Consult (CM Consult) Discharge Planning   Services At/After Discharge None    Resource Information Provided? No   Mode of Transport at Discharge Other (see comment)  (Sister to transport by car.)   Confirm Follow Up Transport Family   Condition of Participation: Discharge Planning   The Plan for Transition of Care is related to the following treatment goals: Home with sister's assistance and Cardiac Rehab   The Patient and/Or Patient Representative agree with the Discharge Plan? Yes

## 2024-05-17 NOTE — PLAN OF CARE
Problem: Discharge Planning  Goal: Discharge to home or other facility with appropriate resources  Outcome: Completed     Problem: Pain  Goal: Verbalizes/displays adequate comfort level or baseline comfort level  Outcome: Completed     Problem: Safety - Adult  Goal: Free from fall injury  Outcome: Completed     Problem: ABCDS Injury Assessment  Goal: Absence of physical injury  Outcome: Completed     Problem: Chronic Conditions and Co-morbidities  Goal: Patient's chronic conditions and co-morbidity symptoms are monitored and maintained or improved  Outcome: Completed     Problem: Chronic Conditions and Co-morbidities  Goal: Patient's chronic conditions and co-morbidity symptoms are monitored and maintained or improved  Outcome: Completed

## 2024-05-17 NOTE — DISCHARGE SUMMARY
[facility] Cardiology Discharge Summary     Patient ID:  Wayne Dorsey  [id number]  88 y.o.  1935    Admit date: 5/13/2024    Discharge date:  5/17/2024     Admitting Physician: César Boyce MD     Discharge Physician: Dr. Steinberg    Admission Diagnoses: Chest pain [R07.9]  Chest pain, unspecified type [R07.9]    Discharge Diagnoses:   Patient Active Problem List    Diagnosis    Chest pain    Diarrhea    CAD in native artery    Severe protein-calorie malnutrition (HCC)    Resistance to clopidogrel    Exertional angina (HCC)    Shortness of breath    Hyperlipemia    GERD (gastroesophageal reflux disease)    Chronic kidney disease    Arrhythmia    Hypertension    Type 2 diabetes mellitus with stage 3b chronic kidney disease, without long-term current use of insulin ([facility])     Cardiology Procedures this admission:  Left heart catheterization with PCI  Consults: hospitalist     Hospital Course: Patient is a 88-year-old male with longstanding history of cervical stenosis and chronic neck and shoulder pain, hypertension and coronary artery disease.  Patient was admitted 01/2024 with Impella assisted left main/ostial LAD/ostial circumflex stenting.  He was readmitted 04/2024 with acute thrombus noted in both LAD and circumflex status post balloon angioplasty to both vessels.  Patient has struggled since that time with chronic substernal chest pain.  He has been seen at cardiac rehab complaining of daily chest pain which reportedly is worsened over the last 7 days.  He was referred to emergency room today.  On presentation here he still complaining of 6 out of 10 chest pain.  Serial high-sensitivity troponins are negative.  EKG does demonstrate more pronounced anterolateral ST depression and T wave inversion.  Blood pressure remains markedly elevated.     Limited ECHO showed normal LV systolic function and normal wall motion. He was taken to cardiac cath lab and underwent cardiac catheterization by  Dr. Boyce. [facility] showed:     [place] stenting of left main LAD circumflex bifurcation remains patent.  Focal in-stent restenosis of the circumflex limb remained stable.  Distal stent edge stenosis of the LAD remained stable.    Severe calcific ostial right coronary artery and distal right coronary artery stenosis treated with shockwave lithotripsy and adjuvant stenting.  Patient tolerated the procedure well and was taken to the telemetry floor for recovery.    During admission the patient developed diarrhea. Medicine team was consulted. Stool culture negative for salmonella or shigella. Stool consistency not suggestive of C.diff. Diarrhea resolved from PRN imodium. Diarrhea was felt to be secondary to colchicine.     The morning of discharge, patient was up feeling well without any complaints of chest pain or shortness of breath. Patient's right radial cath site was clean, dry and intact without hematoma or bruit. Patient's labs were stable. Patient was seen and examined by Dr. Steinberg and determined stable and ready for discharge. Patient was instructed on the importance of medication compliance including taking aspirin and Brilinta  everyday without missing a dose.  After receiving drug eluting stents, the patient will remain on dual anti-platelet therapy for 1 year.  For maximized medical therapy for CAD, patient will continue BB and high intensity statin as well. The patient will not be on a ACE/ARB/ARNI due to CKD/JULIANO. The patient will follow up with [facility] Cardiology -- Dr. Woody in 2-4 weeks. Patient has been referred to cardiac rehab.      DISPOSITION: The patient is being discharged home in stable condition on a low saturated fat, low cholesterol and low salt diet. The patient is instructed to advance activities as tolerated to the limit of fatigue or shortness of breath. The patient is instructed to avoid all heavy lifting, straining, stooping or squatting for 3-5 days. The patient is instructed to watch

## 2024-05-18 LAB
BACTERIA SPEC CULT: NORMAL
SERVICE CMNT-IMP: NORMAL

## 2024-05-20 LAB
O+P SPEC MICRO: NORMAL
O+P STL CONC: NORMAL
SPECIMEN SOURCE: NORMAL

## 2024-06-11 ENCOUNTER — OFFICE VISIT (OUTPATIENT)
Age: 89
End: 2024-06-11
Payer: MEDICARE

## 2024-06-11 VITALS
DIASTOLIC BLOOD PRESSURE: 60 MMHG | HEART RATE: 72 BPM | WEIGHT: 115 LBS | HEIGHT: 63 IN | SYSTOLIC BLOOD PRESSURE: 138 MMHG | BODY MASS INDEX: 20.38 KG/M2

## 2024-06-11 DIAGNOSIS — Z78.9 RESISTANCE TO CLOPIDOGREL: ICD-10-CM

## 2024-06-11 DIAGNOSIS — I25.118 CORONARY ARTERY DISEASE OF NATIVE ARTERY OF NATIVE HEART WITH STABLE ANGINA PECTORIS (HCC): Primary | ICD-10-CM

## 2024-06-11 DIAGNOSIS — I10 ESSENTIAL HYPERTENSION: ICD-10-CM

## 2024-06-11 DIAGNOSIS — E11.22 TYPE 2 DIABETES MELLITUS WITH STAGE 3B CHRONIC KIDNEY DISEASE, WITHOUT LONG-TERM CURRENT USE OF INSULIN (HCC): ICD-10-CM

## 2024-06-11 DIAGNOSIS — N18.32 TYPE 2 DIABETES MELLITUS WITH STAGE 3B CHRONIC KIDNEY DISEASE, WITHOUT LONG-TERM CURRENT USE OF INSULIN (HCC): ICD-10-CM

## 2024-06-11 PROCEDURE — 1036F TOBACCO NON-USER: CPT | Performed by: INTERNAL MEDICINE

## 2024-06-11 PROCEDURE — 3051F HG A1C>EQUAL 7.0%<8.0%: CPT | Performed by: INTERNAL MEDICINE

## 2024-06-11 PROCEDURE — 1123F ACP DISCUSS/DSCN MKR DOCD: CPT | Performed by: INTERNAL MEDICINE

## 2024-06-11 PROCEDURE — 99214 OFFICE O/P EST MOD 30 MIN: CPT | Performed by: INTERNAL MEDICINE

## 2024-06-11 PROCEDURE — G8420 CALC BMI NORM PARAMETERS: HCPCS | Performed by: INTERNAL MEDICINE

## 2024-06-11 PROCEDURE — 1111F DSCHRG MED/CURRENT MED MERGE: CPT | Performed by: INTERNAL MEDICINE

## 2024-06-11 PROCEDURE — G8427 DOCREV CUR MEDS BY ELIG CLIN: HCPCS | Performed by: INTERNAL MEDICINE

## 2024-07-09 RX ORDER — ROSUVASTATIN CALCIUM 20 MG/1
20 TABLET, COATED ORAL DAILY
Qty: 90 TABLET | Refills: 3 | Status: SHIPPED | OUTPATIENT
Start: 2024-07-09

## 2024-07-09 NOTE — TELEPHONE ENCOUNTER
Requested Prescriptions     Pending Prescriptions Disp Refills    rosuvastatin (CRESTOR) 20 MG tablet 90 tablet 3     Sig: Take 1 tablet by mouth daily     Rx verified last ov 6/11/24

## 2024-07-09 NOTE — TELEPHONE ENCOUNTER
MEDICATION REFILL REQUEST      Name of Medication:  rosuvastatin   Dose:  20 mg  Frequency:  daily   Quantity:    Days' supply:  90      Pharmacy Name/Location:  [facility]

## 2024-09-18 ENCOUNTER — OFFICE VISIT (OUTPATIENT)
Age: 89
End: 2024-09-18
Payer: MEDICARE

## 2024-09-18 VITALS
SYSTOLIC BLOOD PRESSURE: 122 MMHG | HEART RATE: 64 BPM | WEIGHT: 113.4 LBS | HEIGHT: 62 IN | DIASTOLIC BLOOD PRESSURE: 72 MMHG | BODY MASS INDEX: 20.87 KG/M2

## 2024-09-18 DIAGNOSIS — N18.32 STAGE 3B CHRONIC KIDNEY DISEASE (HCC): ICD-10-CM

## 2024-09-18 DIAGNOSIS — I20.89 EXERTIONAL ANGINA (HCC): ICD-10-CM

## 2024-09-18 DIAGNOSIS — E11.22 TYPE 2 DIABETES MELLITUS WITH STAGE 3B CHRONIC KIDNEY DISEASE, WITHOUT LONG-TERM CURRENT USE OF INSULIN (HCC): ICD-10-CM

## 2024-09-18 DIAGNOSIS — N18.32 TYPE 2 DIABETES MELLITUS WITH STAGE 3B CHRONIC KIDNEY DISEASE, WITHOUT LONG-TERM CURRENT USE OF INSULIN (HCC): ICD-10-CM

## 2024-09-18 DIAGNOSIS — I25.10 CORONARY ARTERY DISEASE INVOLVING NATIVE CORONARY ARTERY OF NATIVE HEART WITHOUT ANGINA PECTORIS: Primary | ICD-10-CM

## 2024-09-18 DIAGNOSIS — K21.9 GASTROESOPHAGEAL REFLUX DISEASE WITHOUT ESOPHAGITIS: ICD-10-CM

## 2024-09-18 PROCEDURE — G8420 CALC BMI NORM PARAMETERS: HCPCS | Performed by: INTERNAL MEDICINE

## 2024-09-18 PROCEDURE — 1036F TOBACCO NON-USER: CPT | Performed by: INTERNAL MEDICINE

## 2024-09-18 PROCEDURE — 3051F HG A1C>EQUAL 7.0%<8.0%: CPT | Performed by: INTERNAL MEDICINE

## 2024-09-18 PROCEDURE — G8427 DOCREV CUR MEDS BY ELIG CLIN: HCPCS | Performed by: INTERNAL MEDICINE

## 2024-09-18 PROCEDURE — 99214 OFFICE O/P EST MOD 30 MIN: CPT | Performed by: INTERNAL MEDICINE

## 2024-09-18 PROCEDURE — 1123F ACP DISCUSS/DSCN MKR DOCD: CPT | Performed by: INTERNAL MEDICINE

## 2024-09-18 RX ORDER — CARVEDILOL 25 MG/1
25 TABLET ORAL 2 TIMES DAILY WITH MEALS
Qty: 180 TABLET | Refills: 3 | Status: SHIPPED | OUTPATIENT
Start: 2024-09-18

## 2024-09-18 ASSESSMENT — ENCOUNTER SYMPTOMS: BLOATING: 1

## 2024-09-21 ENCOUNTER — HOSPITAL ENCOUNTER (EMERGENCY)
Age: 89
Discharge: HOME | End: 2024-09-21
Attending: EMERGENCY MEDICINE
Payer: MEDICARE

## 2024-09-21 ENCOUNTER — APPOINTMENT (OUTPATIENT)
Dept: GENERAL RADIOLOGY | Age: 89
End: 2024-09-21
Payer: MEDICARE

## 2024-09-21 VITALS
WEIGHT: 113.32 LBS | HEIGHT: 62 IN | TEMPERATURE: 98.3 F | DIASTOLIC BLOOD PRESSURE: 75 MMHG | RESPIRATION RATE: 14 BRPM | OXYGEN SATURATION: 98 % | HEART RATE: 61 BPM | BODY MASS INDEX: 20.85 KG/M2 | SYSTOLIC BLOOD PRESSURE: 140 MMHG

## 2024-09-21 DIAGNOSIS — R07.9 CHEST PAIN IN ADULT: Primary | ICD-10-CM

## 2024-09-21 LAB
ALBUMIN SERPL-MCNC: 3.9 G/DL (ref 3.2–4.6)
ALBUMIN/GLOB SERPL: 1.1 (ref 1–1.9)
ALP SERPL-CCNC: 79 U/L (ref 40–129)
ALT SERPL-CCNC: 15 U/L (ref 12–65)
ANION GAP SERPL CALC-SCNC: 11 MMOL/L (ref 9–18)
AST SERPL-CCNC: 25 U/L (ref 15–37)
BASOPHILS # BLD: 0 K/UL (ref 0–0.2)
BASOPHILS NFR BLD: 1 % (ref 0–2)
BILIRUB SERPL-MCNC: 0.5 MG/DL (ref 0–1.2)
BUN SERPL-MCNC: 27 MG/DL (ref 8–23)
CALCIUM SERPL-MCNC: 9.4 MG/DL (ref 8.8–10.2)
CHLORIDE SERPL-SCNC: 105 MMOL/L (ref 98–107)
CO2 SERPL-SCNC: 24 MMOL/L (ref 20–28)
CREAT SERPL-MCNC: 1.73 MG/DL (ref 0.8–1.3)
D DIMER PPP FEU-MCNC: 0.64 UG/ML(FEU)
DIFFERENTIAL METHOD BLD: ABNORMAL
EKG ATRIAL RATE: 59 BPM
EKG DIAGNOSIS: NORMAL
EKG P AXIS: 81 DEGREES
EKG P-R INTERVAL: 186 MS
EKG Q-T INTERVAL: 422 MS
EKG QRS DURATION: 104 MS
EKG QTC CALCULATION (BAZETT): 417 MS
EKG R AXIS: -13 DEGREES
EKG T AXIS: 110 DEGREES
EKG VENTRICULAR RATE: 59 BPM
EOSINOPHIL # BLD: 0.1 K/UL (ref 0–0.8)
EOSINOPHIL NFR BLD: 2 % (ref 0.5–7.8)
ERYTHROCYTE [DISTWIDTH] IN BLOOD BY AUTOMATED COUNT: 14.9 % (ref 11.9–14.6)
GLOBULIN SER CALC-MCNC: 3.6 G/DL (ref 2.3–3.5)
GLUCOSE SERPL-MCNC: 187 MG/DL (ref 70–99)
HCT VFR BLD AUTO: 36.7 % (ref 41.1–50.3)
HGB BLD-MCNC: 11.9 G/DL (ref 13.6–17.2)
IMM GRANULOCYTES # BLD AUTO: 0 K/UL (ref 0–0.5)
IMM GRANULOCYTES NFR BLD AUTO: 1 % (ref 0–5)
LIPASE SERPL-CCNC: 30 U/L (ref 13–60)
LYMPHOCYTES # BLD: 1.4 K/UL (ref 0.5–4.6)
LYMPHOCYTES NFR BLD: 33 % (ref 13–44)
MCH RBC QN AUTO: 28.9 PG (ref 26.1–32.9)
MCHC RBC AUTO-ENTMCNC: 32.4 G/DL (ref 31.4–35)
MCV RBC AUTO: 89.1 FL (ref 82–102)
MONOCYTES # BLD: 0.4 K/UL (ref 0.1–1.3)
MONOCYTES NFR BLD: 9 % (ref 4–12)
NEUTS SEG # BLD: 2.3 K/UL (ref 1.7–8.2)
NEUTS SEG NFR BLD: 54 % (ref 43–78)
NRBC # BLD: 0 K/UL (ref 0–0.2)
NT PRO BNP: 254 PG/ML (ref 0–450)
PLATELET # BLD AUTO: 184 K/UL (ref 150–450)
PMV BLD AUTO: 10 FL (ref 9.4–12.3)
POTASSIUM SERPL-SCNC: 4.5 MMOL/L (ref 3.5–5.1)
PROT SERPL-MCNC: 7.5 G/DL (ref 6.3–8.2)
RBC # BLD AUTO: 4.12 M/UL (ref 4.23–5.6)
SODIUM SERPL-SCNC: 140 MMOL/L (ref 136–145)
TROPONIN T SERPL HS-MCNC: 25 NG/L (ref 0–22)
TROPONIN T SERPL HS-MCNC: 26 NG/L (ref 0–22)
WBC # BLD AUTO: 4.1 K/UL (ref 4.3–11.1)

## 2024-09-21 PROCEDURE — 6360000002 HC RX W HCPCS: Performed by: EMERGENCY MEDICINE

## 2024-09-21 PROCEDURE — 80053 COMPREHEN METABOLIC PANEL: CPT

## 2024-09-21 PROCEDURE — 99285 EMERGENCY DEPT VISIT HI MDM: CPT

## 2024-09-21 PROCEDURE — 85379 FIBRIN DEGRADATION QUANT: CPT

## 2024-09-21 PROCEDURE — 83690 ASSAY OF LIPASE: CPT

## 2024-09-21 PROCEDURE — 85025 COMPLETE CBC W/AUTO DIFF WBC: CPT

## 2024-09-21 PROCEDURE — 71046 X-RAY EXAM CHEST 2 VIEWS: CPT

## 2024-09-21 PROCEDURE — 6370000000 HC RX 637 (ALT 250 FOR IP): Performed by: EMERGENCY MEDICINE

## 2024-09-21 PROCEDURE — 83880 ASSAY OF NATRIURETIC PEPTIDE: CPT

## 2024-09-21 PROCEDURE — 2500000003 HC RX 250 WO HCPCS: Performed by: EMERGENCY MEDICINE

## 2024-09-21 PROCEDURE — 96374 THER/PROPH/DIAG INJ IV PUSH: CPT

## 2024-09-21 PROCEDURE — 96375 TX/PRO/DX INJ NEW DRUG ADDON: CPT

## 2024-09-21 PROCEDURE — 93005 ELECTROCARDIOGRAM TRACING: CPT | Performed by: EMERGENCY MEDICINE

## 2024-09-21 PROCEDURE — 84484 ASSAY OF TROPONIN QUANT: CPT

## 2024-09-21 PROCEDURE — 93010 ELECTROCARDIOGRAM REPORT: CPT | Performed by: INTERNAL MEDICINE

## 2024-09-21 RX ORDER — MORPHINE SULFATE 2 MG/ML
2 INJECTION, SOLUTION INTRAMUSCULAR; INTRAVENOUS ONCE
Status: COMPLETED | OUTPATIENT
Start: 2024-09-21 | End: 2024-09-21

## 2024-09-21 RX ORDER — FAMOTIDINE 20 MG/1
40 TABLET, FILM COATED ORAL
Status: COMPLETED | OUTPATIENT
Start: 2024-09-21 | End: 2024-09-21

## 2024-09-21 RX ORDER — ONDANSETRON 2 MG/ML
4 INJECTION INTRAMUSCULAR; INTRAVENOUS
Status: COMPLETED | OUTPATIENT
Start: 2024-09-21 | End: 2024-09-21

## 2024-09-21 RX ORDER — ACETAMINOPHEN 325 MG/1
650 TABLET ORAL ONCE
Status: COMPLETED | OUTPATIENT
Start: 2024-09-21 | End: 2024-09-21

## 2024-09-21 RX ORDER — ASPIRIN 81 MG/1
162 TABLET, CHEWABLE ORAL ONCE
Status: COMPLETED | OUTPATIENT
Start: 2024-09-21 | End: 2024-09-21

## 2024-09-21 RX ADMIN — ONDANSETRON 4 MG: 2 INJECTION INTRAMUSCULAR; INTRAVENOUS at 12:34

## 2024-09-21 RX ADMIN — NITROGLYCERIN 1 INCH: 20 OINTMENT TOPICAL at 12:36

## 2024-09-21 RX ADMIN — ACETAMINOPHEN 650 MG: 325 TABLET ORAL at 12:35

## 2024-09-21 RX ADMIN — FAMOTIDINE 40 MG: 20 TABLET, FILM COATED ORAL at 12:35

## 2024-09-21 RX ADMIN — ASPIRIN 81 MG 162 MG: 81 TABLET ORAL at 12:35

## 2024-09-21 RX ADMIN — MORPHINE SULFATE 2 MG: 2 INJECTION, SOLUTION INTRAMUSCULAR; INTRAVENOUS at 12:35

## 2024-09-21 ASSESSMENT — LIFESTYLE VARIABLES
HOW MANY STANDARD DRINKS CONTAINING ALCOHOL DO YOU HAVE ON A TYPICAL DAY: PATIENT DOES NOT DRINK
HOW OFTEN DO YOU HAVE A DRINK CONTAINING ALCOHOL: NEVER

## 2024-09-21 ASSESSMENT — ENCOUNTER SYMPTOMS
ABDOMINAL PAIN: 0
BACK PAIN: 0
BLURRED VISION: 0
DOUBLE VISION: 0
COUGH: 0
VOMITING: 0
SHORTNESS OF BREATH: 0
HEMOPTYSIS: 0
BLOATING: 0
NAUSEA: 0
ORTHOPNEA: 0

## 2024-09-21 ASSESSMENT — PAIN SCALES - GENERAL
PAINLEVEL_OUTOF10: 6
PAINLEVEL_OUTOF10: 5

## 2024-09-21 ASSESSMENT — PAIN - FUNCTIONAL ASSESSMENT: PAIN_FUNCTIONAL_ASSESSMENT: 0-10

## 2024-09-25 ENCOUNTER — TELEPHONE (OUTPATIENT)
Age: 89
End: 2024-09-25

## 2024-12-11 ENCOUNTER — APPOINTMENT (OUTPATIENT)
Dept: GENERAL RADIOLOGY | Age: 89
End: 2024-12-11
Payer: OTHER GOVERNMENT

## 2024-12-11 ENCOUNTER — HOSPITAL ENCOUNTER (EMERGENCY)
Age: 89
Discharge: HOME | End: 2024-12-11
Attending: EMERGENCY MEDICINE
Payer: OTHER GOVERNMENT

## 2024-12-11 ENCOUNTER — TELEPHONE (OUTPATIENT)
Age: 89
End: 2024-12-11

## 2024-12-11 VITALS
BODY MASS INDEX: 20.06 KG/M2 | HEIGHT: 62 IN | SYSTOLIC BLOOD PRESSURE: 154 MMHG | WEIGHT: 109 LBS | DIASTOLIC BLOOD PRESSURE: 57 MMHG | RESPIRATION RATE: 18 BRPM | OXYGEN SATURATION: 98 % | TEMPERATURE: 97.7 F | HEART RATE: 54 BPM

## 2024-12-11 DIAGNOSIS — R07.89 ATYPICAL CHEST PAIN: Primary | ICD-10-CM

## 2024-12-11 LAB
ANION GAP SERPL CALC-SCNC: 10 MMOL/L (ref 7–16)
BUN SERPL-MCNC: 22 MG/DL (ref 8–23)
CALCIUM SERPL-MCNC: 9.2 MG/DL (ref 8.8–10.2)
CHLORIDE SERPL-SCNC: 106 MMOL/L (ref 98–107)
CO2 SERPL-SCNC: 23 MMOL/L (ref 20–29)
CREAT SERPL-MCNC: 1.63 MG/DL (ref 0.8–1.3)
EKG ATRIAL RATE: 56 BPM
EKG DIAGNOSIS: NORMAL
EKG P AXIS: 3 DEGREES
EKG P-R INTERVAL: 191 MS
EKG Q-T INTERVAL: 422 MS
EKG QRS DURATION: 117 MS
EKG QTC CALCULATION (BAZETT): 408 MS
EKG R AXIS: -40 DEGREES
EKG T AXIS: 256 DEGREES
EKG VENTRICULAR RATE: 56 BPM
ERYTHROCYTE [DISTWIDTH] IN BLOOD BY AUTOMATED COUNT: 15.1 % (ref 11.9–14.6)
GLUCOSE SERPL-MCNC: 84 MG/DL (ref 70–99)
HCT VFR BLD AUTO: 35.3 % (ref 41.1–50.3)
HGB BLD-MCNC: 11.8 G/DL (ref 13.6–17.2)
MCH RBC QN AUTO: 29.7 PG (ref 26.1–32.9)
MCHC RBC AUTO-ENTMCNC: 33.4 G/DL (ref 31.4–35)
MCV RBC AUTO: 88.9 FL (ref 82–102)
NRBC # BLD: 0 K/UL (ref 0–0.2)
PLATELET # BLD AUTO: 175 K/UL (ref 150–450)
PMV BLD AUTO: 10.9 FL (ref 9.4–12.3)
POTASSIUM SERPL-SCNC: 5 MMOL/L (ref 3.5–5.1)
RBC # BLD AUTO: 3.97 M/UL (ref 4.23–5.6)
SODIUM SERPL-SCNC: 139 MMOL/L (ref 136–145)
TROPONIN T SERPL HS-MCNC: 20 NG/L (ref 0–22)
TROPONIN T SERPL HS-MCNC: 28 NG/L (ref 0–22)
WBC # BLD AUTO: 4.7 K/UL (ref 4.3–11.1)

## 2024-12-11 PROCEDURE — 93010 ELECTROCARDIOGRAM REPORT: CPT | Performed by: INTERNAL MEDICINE

## 2024-12-11 PROCEDURE — 99285 EMERGENCY DEPT VISIT HI MDM: CPT

## 2024-12-11 PROCEDURE — 80048 BASIC METABOLIC PNL TOTAL CA: CPT

## 2024-12-11 PROCEDURE — 71045 X-RAY EXAM CHEST 1 VIEW: CPT

## 2024-12-11 PROCEDURE — 84484 ASSAY OF TROPONIN QUANT: CPT

## 2024-12-11 PROCEDURE — 85027 COMPLETE CBC AUTOMATED: CPT

## 2024-12-11 PROCEDURE — 93005 ELECTROCARDIOGRAM TRACING: CPT | Performed by: EMERGENCY MEDICINE

## 2024-12-11 ASSESSMENT — ENCOUNTER SYMPTOMS
NAUSEA: 0
COLOR CHANGE: 0
ABDOMINAL PAIN: 0
VOMITING: 0
WHEEZING: 0
SORE THROAT: 0
SHORTNESS OF BREATH: 0
DIARRHEA: 0
COUGH: 0
EYE REDNESS: 0

## 2024-12-11 ASSESSMENT — PAIN SCALES - GENERAL: PAINLEVEL_OUTOF10: 6

## 2024-12-11 ASSESSMENT — PAIN DESCRIPTION - LOCATION: LOCATION: CHEST

## 2024-12-11 ASSESSMENT — PAIN - FUNCTIONAL ASSESSMENT: PAIN_FUNCTIONAL_ASSESSMENT: 0-10

## 2024-12-11 NOTE — ED NOTES
Patient mobility status  with no difficulty.     I have reviewed discharge instructions with the patient.  The patient verbalized understanding.    Patient left ED via Discharge Method: ambulatory to Home with  taxi .    Opportunity for questions and clarification provided.     Patient given 0 scripts.            Terri, Geetha, RN  12/11/24 7627

## 2024-12-11 NOTE — TELEPHONE ENCOUNTER
Patient called stating he has the following issues :    Intermittent CP  SOB  Onset of CP last night

## 2024-12-11 NOTE — ED PROVIDER NOTES
Emergency Department Provider Note       PCP: Nathan Thorne MD   Age: 89 y.o.   Sex: male     DISPOSITION Decision To Discharge 12/11/2024 12:45:10 PM   DISPOSITION CONDITION Stable            ICD-10-CM    1. Atypical chest pain  R07.89           Medical Decision Making     I will do a cardiac evaluation including his troponins.  EKG and associated rhythm strip independent interpretation by ER doctor:  Normal sinus rhythm  No acute ischemic ST segment changes  No QTC prolongation  Rate of: 56    ED Course as of 12/11/24 1245   Wed Dec 11, 2024   1212 His initial troponin appears to be at his baseline. [AC]   1244 2 sets of troponins are negative.  Creatinine is at about his baseline.  I do not think he has anything urgent or emergent and I will discharge him home [AC]      ED Course User Index  [AC] Kenyon Dodson MD     1 acute illness with systemic symptoms.  Shared medical decision making was utilized in creating the patients health plan today.    I independently ordered and reviewed each unique test.  I reviewed external records: provider visit note from outside specialist.     I interpreted the X-rays no obvious infiltrate or effusion.              History     89-year-old gentleman presents with concerns about pain in the center of his chest that started yesterday.  He says someone knocked on his door about 1:30 in the morning.  He thinks that maybe he was just a little bit anxious related to that.  He says the pain does not radiate anywhere.  Is not better with rest and worsened by exertion.  He denies any fevers or chills.  He said no cough or shortness of breath.    On arrival to the ER he says he is actually feeling a lot better.  No other associated symptoms per    EMS did give 1 nitroglycerin.  He also got aspirin.    Elements of this note were created using speech recognition software.  As such, errors of speech recognition may be present.        ROS     Review of Systems   Constitutional:

## 2024-12-11 NOTE — ED TRIAGE NOTES
Pt arrives via ems from home c/o substernal chest pain since last night. States he's had an irritated feeling in his chest since stent placement in April. + shortness of breath. 324 asa, x1 nitro given pta.

## 2024-12-11 NOTE — DISCHARGE INSTRUCTIONS
As we discussed, we did not find the exact cause of your chest symptoms today in the emergency department.  Therefore, it is important for you to follow-up with your cardiologist or your primary care doctor for further evaluation.    Please return with any fevers, worsening pain, difficulty breathing, increasing symptoms, or additional concerns.

## 2024-12-11 NOTE — TELEPHONE ENCOUNTER
S/w patient. C/o intermittent CP since 1:00am. Each episode lasting 5-7 minutes. Report CP episodes very frequent. States he can't tell if anything makes it better or worse. Aso c/o SOB daily    Reports has not taken NTG and has not missed nay doses of ASA or Brilinta    Patient has h/o STEMI w/ Stent Thrombosis    Patient advised to go to ER now. He does not have anyone to drive him. Patient advised to call EMS now. Patient agreed.

## 2025-03-06 NOTE — PROGRESS NOTES
César Boyce MD at [facility] CARDIAC CATH LAB    CAROTID ENDARTERECTOMY  2002    Left Sided    COLONOSCOPY  2016    HEMORRHOID SURGERY  2007    INVASIVE VASCULAR N/A 2024    Aortagram abdominal w runoff performed by Calvin Steinberg MD at [facility] CARDIAC CATH LAB    LUMBAR LAMINECTOMY      T5-T6    OTHER SURGICAL HISTORY  2005    Sinus    OTHER SURGICAL HISTORY      elbow surgery    THORACIC LAMINECTOMY  2004's     Family History   Problem Relation Age of Onset    Diabetes Brother     Hypertension Sister     Hypertension Father     Heart Disease Mother     Diabetes Mother     Diabetes Brother     Heart Disease Sister      Social History     Tobacco Use    Smoking status: Former     Current packs/day: 0.00     Types: Cigarettes     Quit date: 1986     Years since quittin.5    Smokeless tobacco: Never    Tobacco comments:     Quit smoking: pipe smoker 2 geraldine/day   Substance Use Topics    Alcohol use: Yes     Alcohol/week: 1.0 standard drink of alcohol       ROS:    Review of Systems   Cardiovascular:  Negative for chest pain.   Respiratory:  Negative for shortness of breath.    Neurological:  Positive for vertigo.          PHYSICAL EXAM:   BP (!) 114/54   Pulse 56   Ht 1.575 m (5' 2\")   Wt 52.2 kg (115 lb)   BMI 21.03 kg/m²      Wt Readings from Last 3 Encounters:   25 52.2 kg (115 lb)   24 49.4 kg (109 lb)   24 51.4 kg (113 lb 5.1 oz)     BP Readings from Last 3 Encounters:   25 (!) 114/54   24 (!) 154/57   24 (!) 140/75     Pulse Readings from Last 3 Encounters:   25 56   24 54   24 61           Physical Exam  Constitutional:       Appearance: Normal appearance.   HENT:      Head: Normocephalic and atraumatic.   Eyes:      Conjunctiva/sclera: Conjunctivae normal.   Neck:      Vascular: No carotid bruit.   Cardiovascular:      Rate and Rhythm: Normal rate and regular rhythm.      Heart sounds: No murmur heard.     No friction rub.

## 2025-03-07 ENCOUNTER — OFFICE VISIT (OUTPATIENT)
Age: 89
End: 2025-03-07

## 2025-03-07 VITALS
SYSTOLIC BLOOD PRESSURE: 114 MMHG | BODY MASS INDEX: 21.16 KG/M2 | HEART RATE: 56 BPM | HEIGHT: 62 IN | WEIGHT: 115 LBS | DIASTOLIC BLOOD PRESSURE: 54 MMHG

## 2025-03-07 DIAGNOSIS — I25.10 CORONARY ARTERY DISEASE INVOLVING NATIVE CORONARY ARTERY OF NATIVE HEART WITHOUT ANGINA PECTORIS: Primary | ICD-10-CM

## 2025-03-07 DIAGNOSIS — N18.32 TYPE 2 DIABETES MELLITUS WITH STAGE 3B CHRONIC KIDNEY DISEASE, WITHOUT LONG-TERM CURRENT USE OF INSULIN (HCC): ICD-10-CM

## 2025-03-07 DIAGNOSIS — I20.89 EXERTIONAL ANGINA: ICD-10-CM

## 2025-03-07 DIAGNOSIS — Z78.9 RESISTANCE TO CLOPIDOGREL: ICD-10-CM

## 2025-03-07 DIAGNOSIS — I10 ESSENTIAL HYPERTENSION: ICD-10-CM

## 2025-03-07 DIAGNOSIS — E78.2 MIXED HYPERLIPIDEMIA: ICD-10-CM

## 2025-03-07 DIAGNOSIS — E11.22 TYPE 2 DIABETES MELLITUS WITH STAGE 3B CHRONIC KIDNEY DISEASE, WITHOUT LONG-TERM CURRENT USE OF INSULIN (HCC): ICD-10-CM

## 2025-03-07 ASSESSMENT — ENCOUNTER SYMPTOMS: SHORTNESS OF BREATH: 0

## 2025-04-23 ENCOUNTER — OFFICE VISIT (OUTPATIENT)
Age: 89
End: 2025-04-23

## 2025-04-23 DIAGNOSIS — M19.012 ARTHRITIS OF LEFT ACROMIOCLAVICULAR JOINT: ICD-10-CM

## 2025-04-23 DIAGNOSIS — M48.02 CERVICAL SPINAL STENOSIS: ICD-10-CM

## 2025-04-23 DIAGNOSIS — M54.2 NECK PAIN: Primary | ICD-10-CM

## 2025-04-23 DIAGNOSIS — M75.42 ROTATOR CUFF IMPINGEMENT SYNDROME OF LEFT SHOULDER: ICD-10-CM

## 2025-04-23 RX ORDER — PANTOPRAZOLE SODIUM 40 MG/1
TABLET, DELAYED RELEASE ORAL
COMMUNITY
Start: 2025-04-23

## 2025-04-23 NOTE — PROGRESS NOTES
An MRI of the Cervical spine has been ordered. A referral to PT has been ordered of the Left shoulder.

## 2025-04-24 RX ORDER — METHYLPREDNISOLONE ACETATE 40 MG/ML
40 INJECTION, SUSPENSION INTRA-ARTICULAR; INTRALESIONAL; INTRAMUSCULAR; SOFT TISSUE ONCE
Status: SHIPPED | OUTPATIENT
Start: 2025-04-24

## 2025-04-24 NOTE — PROGRESS NOTES
Name: Wayne Dorsey  YOB: 1935  Gender: male  MRN: [id number]    Chief complaint: Follow-up (Neck pain into left shoulder and back)       History of present illness:    History of Present Illness  The patient presents for evaluation of cervical stenosis.    The patient was last seen in April 2022 with cervical stenosis at C3-4, with no myelomalacia. They discussed the possibility of a C3-6 laminectomy and fusion with Dr. Wild but did not pursue surgery due to well-managed symptoms and no myelopathy.    Severe pain in the left shoulder,  elbow, and down the left side of the back began 8-10 days ago.  There is concern about potential worsening of his cervical stenosis    The patient has been experiencing balance issues and lightheadedness for the past 10 days, which resulted in a fall. Despite these issues, he continues to exercise 4 days a week, including walking at [employer].    There is no new numbness or tingling beyond the usual diabetic neuropathy. The patient is not dropping things. They have neuropathy in both hands and feet. Blood sugar was 113 two days ago and is well-managed.    Since the last visit, the patient has had 2 heart attacks with stent placements. They were prescribed Brilinta, which is causing significant lightheadedness. The last heart attack occurred in March 2024.              ROS/Meds/PSH/PMH/FH/SH: I personally reviewed the patient's collected intake data.  Below are the pertinents:    Allergies   Allergen Reactions    Zinc Rash    Enalapril      Other reaction(s): Abdominal pain, Abdominal pain-Intolerance  Other reaction(s): Abdominal pain-Intolerance      Ranexa [Ranolazine]          Current Outpatient Medications:     pantoprazole (PROTONIX) 40 MG tablet, , Disp: , Rfl:     carvedilol (COREG) 25 MG tablet, Take 1 tablet by mouth 2 times daily (with meals), Disp: 180 tablet, Rfl: 3    rosuvastatin (CRESTOR) 20 MG tablet, Take 1 tablet by mouth daily, Disp:

## 2025-05-05 ENCOUNTER — EVALUATION (OUTPATIENT)
Age: 89
End: 2025-05-05

## 2025-05-05 DIAGNOSIS — Z78.9 DECREASED ACTIVITIES OF DAILY LIVING (ADL): ICD-10-CM

## 2025-05-05 DIAGNOSIS — M54.2 CERVICAL PAIN: ICD-10-CM

## 2025-05-05 DIAGNOSIS — R68.89 DECREASED ACTIVITY TOLERANCE: ICD-10-CM

## 2025-05-05 DIAGNOSIS — M43.6 STIFFNESS OF CERVICAL SPINE: ICD-10-CM

## 2025-05-05 DIAGNOSIS — M75.42 ROTATOR CUFF IMPINGEMENT SYNDROME OF LEFT SHOULDER: Primary | ICD-10-CM

## 2025-05-05 DIAGNOSIS — M19.012 ARTHRITIS OF LEFT ACROMIOCLAVICULAR JOINT: ICD-10-CM

## 2025-05-05 DIAGNOSIS — R29.898 DECREASED STRENGTH OF TRUNK AND BACK: ICD-10-CM

## 2025-05-05 NOTE — PROGRESS NOTES
Diagnosis:   Neck pain  (primary encounter diagnosis)  Rotator cuff impingement syndrome of left shoulder  Arthritis of left acromioclavicular joint  Cervical spinal stenosis        Area of Treatment: left shoulder     Frequency: 2-3 x/week  Number of weeks: 4-6 weeks     Requested Treatment(s): Evaluate & Treat  home exercise program  stretching/flexibility  lumbar stabilization  dry needling  manual therapy  Modalities as needed    .sohail  
unattended  ([id number]) Ultrasound/phonophoresis  (19313) Hot/cold pack    The referring medical provider has reviewed and approved this evaluation and plan of care as noted by the electronic signature attached to note.    GOALS     SHORT - TERM GOALS TO BE ATTAINED BY 5/30/25:  Evaluate shoulder complaint.  Patient will complete Raul Shoulder Score and establish goal.  Patient will voice understanding of symptom limited activity principle.  Patient will report maintaining pain <= 4/10 w symptom limited activity, use of cold and elevation, meds and exercise.  Patient will report compliance w home program of exercise, cold and elevation.  Patient will demonstrate independent skill w initial HEP.    LONG - TERM GOALS TO BE ATTAINED BY 7/6/25:  Improve Neck Disability Index score to <= 20% limitation.  Patient will return to normal sleep pattern re quality and duration.  Patient will report resolution or delayed onset and/or intensity of cervical symptoms/complaints.  Patient will attain asymptomatic neck, upper trunk mobility that allows patient to perform ADLs involving changing head or upper extremity position, sustained postures/positions of the head, upper back for self care ADLs, dressing, reading, driving, computer use, lifting, carrying, reaching.  Patient will attain function - ROM, mm strength - endurance, coordination of movement - that allows patient to resume desired activities/attain patient goals of: return to premorbid function.       [employer]

## 2025-05-06 ENCOUNTER — OFFICE VISIT (OUTPATIENT)
Age: 89
End: 2025-05-06
Payer: MEDICARE

## 2025-05-06 DIAGNOSIS — M48.02 CERVICAL SPINAL STENOSIS: ICD-10-CM

## 2025-05-06 DIAGNOSIS — M48.02 FORAMINAL STENOSIS OF CERVICAL REGION: ICD-10-CM

## 2025-05-06 DIAGNOSIS — M19.012 ARTHRITIS OF LEFT ACROMIOCLAVICULAR JOINT: Primary | ICD-10-CM

## 2025-05-06 PROCEDURE — G8428 CUR MEDS NOT DOCUMENT: HCPCS | Performed by: PHYSICIAN ASSISTANT

## 2025-05-06 PROCEDURE — 1036F TOBACCO NON-USER: CPT | Performed by: PHYSICIAN ASSISTANT

## 2025-05-06 PROCEDURE — 99214 OFFICE O/P EST MOD 30 MIN: CPT | Performed by: PHYSICIAN ASSISTANT

## 2025-05-06 PROCEDURE — G8420 CALC BMI NORM PARAMETERS: HCPCS | Performed by: PHYSICIAN ASSISTANT

## 2025-05-06 PROCEDURE — 1123F ACP DISCUSS/DSCN MKR DOCD: CPT | Performed by: PHYSICIAN ASSISTANT

## 2025-05-06 NOTE — PROGRESS NOTES
Problem: Wound:  Goal: Will show signs of wound healing; wound closure and no evidence of infection  Description: Will show signs of wound healing; wound closure and no evidence of infection  Outcome: Ongoing     Problem: Pain:  Goal: Pain level will decrease  Description: Pain level will decrease  Outcome: Ongoing  Goal: Control of acute pain  Description: Control of acute pain  Outcome: Ongoing  Goal: Control of chronic pain  Description: Control of chronic pain  Outcome: Ongoing
intervention but he does not feel that he would warrant that treatment at this time and does not want injections.  He will continue with physical therapy for shoulder we will refer him to a shoulder specialist as most of his pain is in the left shoulder.  Follow-up: Schedule shoulder specialist appointment.      Stenosis: This patient's clinical history and physical exam is consistent with cervical stenosis and I have some concern of progression of the stenosis.  Patients with cervical stenosis often report a history of episodic neck pain and or headaches.  However patients do not necessarily have to have neck pain to be experiencing symptoms of cervical stenosis. Symptoms caused by pinched spinal nerves in the neck may cause pain, numbness, or weakness anywhere in the upper extremity  I discussed the natural history of this condition with him  in that it is typically slowly progressive and may begin to affect gait and coordination to a greater extent.  He understands that conservative treatments available include rest, NSAIDs, pain medication, and physical therapy as symptoms allow.  Oral and/or epidural steroids are other options to consider. Since myelopathy is typically progressive and can lead to irreversible neurologic deficits, we also discussed potential surgical options.    ----Referral to a shoulder specialist who can evaluate for shoulder pain.           No orders of the defined types were placed in this encounter.       No orders of the defined types were placed in this encounter.       4 This is a chronic illness/condition with exacerbation and progression      No follow-ups on file.     The patient (or guardian, if applicable) and other individuals in attendance with the patient were advised that Artificial Intelligence will be utilized during this visit to record, process the conversation to generate a clinical note, and support improvement of the AI technology. The patient (or guardian, if

## 2025-05-08 NOTE — PROGRESS NOTES
[facility] ORTHOPAEDICS  [address] [address]  Dept: [phone]      Physical Therapy Daily Note     Referring MD: Sharon Black, PA-C  Diagnosis:     ICD-10-CM    1. Rotator cuff impingement syndrome of left shoulder  M75.42       2. Arthritis of left acromioclavicular joint  M19.012       3. Cervical pain  M54.2       4. Stiffness of cervical spine  M43.6       5. Decreased strength of trunk and back  R29.898       6. Decreased activity tolerance  R68.89       7. Decreased activities of daily living (ADL)  Z78.9          Surgery: No    Therapy precautions: CAD w hx of MI & stints; hx of TIA; hx of C6 - 7 ACDF.  Co-morbidities affecting plan of care: hx of C6 - 7 ACDF.    Payor: Payor: [facility] MEDICARE /  /  /  Billing pattern: Government- total time     Total Timed Procedure Codes: 50 min, Total Time: 50 min Modifier needed: No  Episode visit count:  2     PERTINENT MEDICAL HISTORY     Past medical and surgical history:   Past Medical History:   Diagnosis Date    Arrhythmia     since a child, benign per patient     Arthritis     BPPV (benign paroxysmal positional vertigo)     Carotid stenosis     Hx of left endarterectomy    Chronic kidney disease     long hx of renal insufficiency    Depression     much improved at this time    Diabetes (HCC) dx 1995    [place]ell, avgfbs 100, check glucose 3 times a week, last A1C was 7, does not recognize lows    GERD (gastroesophageal reflux disease)     controlled by medication    Hyperlipemia     Hypertension     Osteoarthritis, knee     PUD (peptic ulcer disease) 05/2016    treated with protonix    TIA (transient ischemic attack)     2003     TIA (transient ischemic attack) 2003     my weakness on left side    Varicella       Past Surgical History:   Procedure Laterality Date    CARDIAC PROCEDURE N/A 1/11/2024    Left heart cath / coronary angiography performed by Calvin Steinberg MD at [facility] CARDIAC CATH LAB    CARDIAC PROCEDURE N/A 1/12/2024

## 2025-05-09 ENCOUNTER — TREATMENT (OUTPATIENT)
Age: 89
End: 2025-05-09

## 2025-05-09 DIAGNOSIS — M75.42 ROTATOR CUFF IMPINGEMENT SYNDROME OF LEFT SHOULDER: Primary | ICD-10-CM

## 2025-05-09 DIAGNOSIS — M19.012 ARTHRITIS OF LEFT ACROMIOCLAVICULAR JOINT: ICD-10-CM

## 2025-05-09 DIAGNOSIS — R68.89 DECREASED ACTIVITY TOLERANCE: ICD-10-CM

## 2025-05-09 DIAGNOSIS — M54.2 CERVICAL PAIN: ICD-10-CM

## 2025-05-09 DIAGNOSIS — M43.6 STIFFNESS OF CERVICAL SPINE: ICD-10-CM

## 2025-05-09 DIAGNOSIS — R29.898 DECREASED STRENGTH OF TRUNK AND BACK: ICD-10-CM

## 2025-05-09 DIAGNOSIS — Z78.9 DECREASED ACTIVITIES OF DAILY LIVING (ADL): ICD-10-CM

## 2025-05-12 ENCOUNTER — TREATMENT (OUTPATIENT)
Age: 89
End: 2025-05-12
Payer: MEDICARE

## 2025-05-12 DIAGNOSIS — M43.6 STIFFNESS OF CERVICAL SPINE: ICD-10-CM

## 2025-05-12 DIAGNOSIS — R29.898 DECREASED STRENGTH OF TRUNK AND BACK: ICD-10-CM

## 2025-05-12 DIAGNOSIS — M75.42 ROTATOR CUFF IMPINGEMENT SYNDROME OF LEFT SHOULDER: Primary | ICD-10-CM

## 2025-05-12 DIAGNOSIS — M54.2 CERVICAL PAIN: ICD-10-CM

## 2025-05-12 DIAGNOSIS — M19.012 ARTHRITIS OF LEFT ACROMIOCLAVICULAR JOINT: ICD-10-CM

## 2025-05-12 DIAGNOSIS — R68.89 DECREASED ACTIVITY TOLERANCE: ICD-10-CM

## 2025-05-12 DIAGNOSIS — Z78.9 DECREASED ACTIVITIES OF DAILY LIVING (ADL): ICD-10-CM

## 2025-05-12 PROCEDURE — 97110 THERAPEUTIC EXERCISES: CPT

## 2025-05-12 PROCEDURE — 97140 MANUAL THERAPY 1/> REGIONS: CPT

## 2025-05-12 PROCEDURE — 97530 THERAPEUTIC ACTIVITIES: CPT

## 2025-05-12 NOTE — PROGRESS NOTES
[facility] ORTHOPAEDICS  [address] [address]  Dept: [phone]      Physical Therapy Daily Note     Referring MD: Sharon Black, PA-C  Diagnosis:     ICD-10-CM    1. Rotator cuff impingement syndrome of left shoulder  M75.42       2. Arthritis of left acromioclavicular joint  M19.012       3. Cervical pain  M54.2       4. Stiffness of cervical spine  M43.6       5. Decreased strength of trunk and back  R29.898       6. Decreased activity tolerance  R68.89       7. 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  Co-morbidities affecting plan of care: hx of C6 - 7 ACDF.    Payor: Payor: [facility] MEDICARE /  /  /  Billing pattern: Government- total time     Total Timed Procedure Codes: 50 min, Total Time: 50 min Modifier needed: No  Episode visit count:  3     PERTINENT MEDICAL HISTORY     Past medical and surgical history:   Past Medical History:   Diagnosis Date    Arrhythmia     since a child, benign per patient     Arthritis     BPPV (benign paroxysmal positional vertigo)     Carotid stenosis     Hx of left endarterectomy    Chronic kidney disease     long hx of renal insufficiency    Depression     much improved at this time    Diabetes (HCC) dx 1995    typ[place], avgfbs 100, check glucose 3 times a week, last A1C was 7, does not recognize lows    GERD (gastroesophageal reflux disease)     controlled by medication    Hyperlipemia     Hypertension     Osteoarthritis, knee     PUD (peptic ulcer disease) 05/2016    treated with protonix    TIA (transient ischemic attack)     2003     TIA (transient ischemic attack) 2003     my weakness on left side    Varicella       Past Surgical History:   Procedure Laterality Date    CARDIAC PROCEDURE N/A 1/11/2024    Left heart cath / coronary angiography performed by Calvin Steinberg MD at [facility] CARDIAC CATH LAB    CARDIAC PROCEDURE N/A 1/12/2024     No

## 2025-05-16 ENCOUNTER — TREATMENT (OUTPATIENT)
Age: 89
End: 2025-05-16

## 2025-05-16 DIAGNOSIS — M43.6 STIFFNESS OF CERVICAL SPINE: ICD-10-CM

## 2025-05-16 DIAGNOSIS — Z78.9 DECREASED ACTIVITIES OF DAILY LIVING (ADL): ICD-10-CM

## 2025-05-16 DIAGNOSIS — R29.898 DECREASED STRENGTH OF TRUNK AND BACK: ICD-10-CM

## 2025-05-16 DIAGNOSIS — R68.89 DECREASED ACTIVITY TOLERANCE: ICD-10-CM

## 2025-05-16 DIAGNOSIS — M54.2 CERVICAL PAIN: ICD-10-CM

## 2025-05-16 DIAGNOSIS — M19.012 ARTHRITIS OF LEFT ACROMIOCLAVICULAR JOINT: ICD-10-CM

## 2025-05-16 DIAGNOSIS — M75.42 ROTATOR CUFF IMPINGEMENT SYNDROME OF LEFT SHOULDER: Primary | ICD-10-CM

## 2025-05-16 NOTE — PROGRESS NOTES
[facility] ORTHOPAEDICS  [address] [facility] [address] [address]  Dept: [phone]      Physical Therapy Daily Note     Referring MD: Sharon Black, PA-C  Diagnosis:     ICD-10-CM    1. Rotator cuff impingement syndrome of left shoulder  M75.42       2. Arthritis of left acromioclavicular joint  M19.012       3. Cervical pain  M54.2       4. Stiffness of cervical spine  M43.6       5. Decreased strength of trunk and back  R29.898       6. Decreased activity tolerance  R68.89       7. 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  Co-morbidities affecting plan of care: hx of C6 - 7 ACDF.    Payor: Payor: [facility] MEDICARE /  /  /  Billing pattern: Government- total time     Total Timed Procedure Codes: 50 min, Total Time: 50 min Modifier needed: No  Episode visit count:  4     PERTINENT MEDICAL HISTORY     Past medical and surgical history:   Past Medical History:   Diagnosis Date    Arrhythmia     since a child, benign per patient     Arthritis     BPPV (benign paroxysmal positional vertigo)     Carotid stenosis     Hx of left endarterectomy    Chronic kidney disease     long hx of renal insufficiency    Depression     much improved at this time    Diabetes (HCC) dx 1995    [place], avgfbs 100, check glucose 3 times a week, last A1C was 7, does not recognize lows    GERD (gastroesophageal reflux disease)     controlled by medication    Hyperlipemia     Hypertension     Osteoarthritis, knee     PUD (peptic ulcer disease) 05/2016    treated with protonix    TIA (transient ischemic attack)     2003     TIA (transient ischemic attack) 2003     my weakness on left side    Varicella       Past Surgical History:   Procedure Laterality Date    CARDIAC PROCEDURE N/A 1/11/2024    Left heart cath / coronary angiography performed by Calvin Steinberg MD at [facility] CARDIAC CATH LAB    CARDIAC PROCEDURE N/A 1/12/2024

## 2025-05-19 ENCOUNTER — TREATMENT (OUTPATIENT)
Age: 89
End: 2025-05-19
Payer: MEDICARE

## 2025-05-19 DIAGNOSIS — M75.42 ROTATOR CUFF IMPINGEMENT SYNDROME OF LEFT SHOULDER: Primary | ICD-10-CM

## 2025-05-19 DIAGNOSIS — M43.6 STIFFNESS OF CERVICAL SPINE: ICD-10-CM

## 2025-05-19 DIAGNOSIS — R29.898 DECREASED STRENGTH OF TRUNK AND BACK: ICD-10-CM

## 2025-05-19 DIAGNOSIS — R68.89 DECREASED ACTIVITY TOLERANCE: ICD-10-CM

## 2025-05-19 DIAGNOSIS — M54.2 CERVICAL PAIN: ICD-10-CM

## 2025-05-19 DIAGNOSIS — M19.012 ARTHRITIS OF LEFT ACROMIOCLAVICULAR JOINT: ICD-10-CM

## 2025-05-19 DIAGNOSIS — Z78.9 DECREASED ACTIVITIES OF DAILY LIVING (ADL): ICD-10-CM

## 2025-05-19 PROCEDURE — 97140 MANUAL THERAPY 1/> REGIONS: CPT

## 2025-05-19 PROCEDURE — 97530 THERAPEUTIC ACTIVITIES: CPT

## 2025-05-19 PROCEDURE — 97110 THERAPEUTIC EXERCISES: CPT

## 2025-05-19 NOTE — PROGRESS NOTES
[facility] ORTHOPAEDICS  [address] [facility] [address]  Dept: [phone]      Physical Therapy Daily Note     Referring MD: Sharon Black, PA-C  Diagnosis:     ICD-10-CM    1. Rotator cuff impingement syndrome of left shoulder  M75.42       2. Arthritis of left acromioclavicular joint  M19.012       3. Cervical pain  M54.2       4. Stiffness of cervical spine  M43.6       5. Decreased activity tolerance  R68.89       6. Decreased strength of trunk and back  R29.898       7. 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  Co-morbidities affecting plan of care: hx of C6 - 7 ACDF.    Payor: Payor: [facility] MEDICARE /  /  /  Billing pattern: Government- total time     Total Timed Procedure Codes: 50 min, Total Time: 50 min Modifier needed: No  Episode visit count:  5     PERTINENT MEDICAL HISTORY     Past medical and surgical history:   Past Medical History:   Diagnosis Date    Arrhythmia     since a child, benign per patient     Arthritis     BPPV (benign paroxysmal positional vertigo)     Carotid stenosis     Hx of left endarterectomy    Chronic kidney disease     long hx of renal insufficiency    Depression     much improved at this time    Diabetes (HCC) dx 1995    typ[place], avgfbs 100, check glucose 3 times a week, last A1C was 7, does not recognize lows    GERD (gastroesophageal reflux disease)     controlled by medication    Hyperlipemia     Hypertension     Osteoarthritis, knee     PUD (peptic ulcer disease) 05/2016    treated with protonix    TIA (transient ischemic attack)     2003     TIA (transient ischemic attack) 2003     my weakness on left side    Varicella       Past Surgical History:   Procedure Laterality Date    CARDIAC PROCEDURE N/A 1/11/2024    Left heart cath / coronary angiography performed by Calvin Steinberg MD at [facility] CARDIAC CATH LAB    CARDIAC PROCEDURE N/A 1/12/2024

## 2025-05-20 ENCOUNTER — OFFICE VISIT (OUTPATIENT)
Dept: ORTHOPEDIC SURGERY | Age: 89
End: 2025-05-20
Payer: MEDICARE

## 2025-05-20 DIAGNOSIS — M25.512 LEFT SHOULDER PAIN, UNSPECIFIED CHRONICITY: Primary | ICD-10-CM

## 2025-05-20 DIAGNOSIS — M19.012 ARTHRITIS OF LEFT SHOULDER: ICD-10-CM

## 2025-05-20 PROCEDURE — 1160F RVW MEDS BY RX/DR IN RCRD: CPT | Performed by: STUDENT IN AN ORGANIZED HEALTH CARE EDUCATION/TRAINING PROGRAM

## 2025-05-20 PROCEDURE — 20611 DRAIN/INJ JOINT/BURSA W/US: CPT | Performed by: STUDENT IN AN ORGANIZED HEALTH CARE EDUCATION/TRAINING PROGRAM

## 2025-05-20 PROCEDURE — 1123F ACP DISCUSS/DSCN MKR DOCD: CPT | Performed by: STUDENT IN AN ORGANIZED HEALTH CARE EDUCATION/TRAINING PROGRAM

## 2025-05-20 PROCEDURE — 1159F MED LIST DOCD IN RCRD: CPT | Performed by: STUDENT IN AN ORGANIZED HEALTH CARE EDUCATION/TRAINING PROGRAM

## 2025-05-20 PROCEDURE — 99203 OFFICE O/P NEW LOW 30 MIN: CPT | Performed by: STUDENT IN AN ORGANIZED HEALTH CARE EDUCATION/TRAINING PROGRAM

## 2025-05-20 RX ORDER — TRIAMCINOLONE ACETONIDE 40 MG/ML
40 INJECTION, SUSPENSION INTRA-ARTICULAR; INTRAMUSCULAR ONCE
Status: COMPLETED | OUTPATIENT
Start: 2025-05-20 | End: 2025-05-20

## 2025-05-20 RX ADMIN — TRIAMCINOLONE ACETONIDE 40 MG: 40 INJECTION, SUSPENSION INTRA-ARTICULAR; INTRAMUSCULAR at 10:20

## 2025-05-20 NOTE — PROGRESS NOTES
Name: Wayne Dorsey  YOB: 1935  Gender: male  MRN: [id number]  Date of Encounter:  5/20/2025         CC  Chief Complaint   Patient presents with    Shoulder Pain     L       HPI:    History of Present Illness  The patient is an 89-year-old male presenting with shoulder pain.    He reports persistent shoulder discomfort, which he attributes to arthritis resulting from a football injury sustained during his high school years. He has been undergoing physical therapy for approximately 2 weeks, which provides temporary relief but does not alleviate the pain permanently. He also notes swelling in the affected shoulder, causing his shirt to ride up on one side and hang off on the other. He experiences a popping sensation in his shoulder and has a palpable knot on top of it. He has received injections in his shoulder from Dr. Black, which provided relief for about 3 to 4 days. He also received an injection in his neck at [facility], although he does not recall the name of the administering physician. He received a second injection approximately a month ago. He reports no recent infections within the past 2 weeks. He is currently on Brilinta, carvedilol, and amlodipine, but not Coumadin. He has been sleeping on his back and has stopped using pillows due to associated neck issues. He is scheduled to return to physical therapy tomorrow and is seeking recommendations for alternative treatment approaches.    Supplemental Information  He has diabetes. His A1c is about 6.5. His blood sugar normally runs daily from 91 to 110.    MEDICATIONS  Current: Brilinta, carvedilol, amlodipine        PAST HISTORY:   Past medical, surgical, family, social history and allergies reviewed by me.       REVIEW OF SYSTEMS:   As noted in HPI.     PHYSICAL EXAMINATION:   Physical Exam  Shoulder abduction is 120 degrees. Flexion is straight up in front. External rotation is 60 degrees of the back. Internal rotation reaches the

## 2025-05-22 ENCOUNTER — TREATMENT (OUTPATIENT)
Age: 89
End: 2025-05-22
Payer: MEDICARE

## 2025-05-22 DIAGNOSIS — M19.012 ARTHRITIS OF LEFT ACROMIOCLAVICULAR JOINT: ICD-10-CM

## 2025-05-22 DIAGNOSIS — R68.89 DECREASED ACTIVITY TOLERANCE: ICD-10-CM

## 2025-05-22 DIAGNOSIS — M75.42 ROTATOR CUFF IMPINGEMENT SYNDROME OF LEFT SHOULDER: Primary | ICD-10-CM

## 2025-05-22 DIAGNOSIS — M54.2 CERVICAL PAIN: ICD-10-CM

## 2025-05-22 DIAGNOSIS — R29.898 DECREASED STRENGTH OF TRUNK AND BACK: ICD-10-CM

## 2025-05-22 DIAGNOSIS — M43.6 STIFFNESS OF CERVICAL SPINE: ICD-10-CM

## 2025-05-22 PROCEDURE — 97140 MANUAL THERAPY 1/> REGIONS: CPT | Performed by: PHYSICAL THERAPIST

## 2025-05-22 PROCEDURE — 97110 THERAPEUTIC EXERCISES: CPT | Performed by: PHYSICAL THERAPIST

## 2025-05-22 PROCEDURE — 97530 THERAPEUTIC ACTIVITIES: CPT | Performed by: PHYSICAL THERAPIST

## 2025-05-22 NOTE — PROGRESS NOTES
horizontal abduction to bear hug stretch x 1 minute of work  Seated thoracic extension over half foam roller    Manual Therapy  Done to improve joint mobility, pain, and myofascial restrictions Palpation and assessment of soft tissue, muscles, and landmarks   Myofascial release to B upper trap/levator scap, pecs, deltoid  Suboccipital release grade II  Grade II-III inferior/posterior GHJ glides with flexion/abduction/ER L first rib mobilizations with breathing x 5   Therapeutic Activity  Done to improve functional mobility and mechanics, ease with transfers Standing row medium resistance 2 x 10 to simulate opening door  Supine wand flexion/scaption 2 x 10 x 5 sec holds to improve overhead reach Seated B shoulder flexion to 0-90 deg 2# 2 x 10  Wand abduction standing x 1 min of work  Wand functional IR x 1 min of work   Neuromuscular Re-education  Done to improve postural control, balance, proprioception, motor planning and biomechanics     Vasopneumatic Cryotherapy  Done to improve edema and pain          PATIENT EDUCATION: on comfortable stretch range, role of gentle motion in addressing arthritic change    ASSESSMENT     Reassessment of cervical function w NDI indicates current function @ 78%, an improvement of 6 percentage points from IE.  Shoulder function w PSS indicates function @ 61%.    PLAN     Assess progress toward STGs.  Progress exercise program addressing cervical ROM and strength - endurance of the upper trunk, shoulder and cervical mm.  MT for cervical and shoulder.      PLAN OF CARE     Effective Dates/Duration: 5/5/2025 TO 7/6/2025 (60 days).    Frequency: 2x/week   Interventions may include but are not limited to:   (38323) Therapeutic exercise to develop ROM, strength, endurance and flexibility  (54476) Therapeutic activities using dynamic activities to improve function  (79136) Manual therapy techniques to improve joint and/or soft tissue mobility, ROM, and function as well as helping to

## 2025-06-02 ENCOUNTER — TELEPHONE (OUTPATIENT)
Age: 89
End: 2025-06-02

## 2025-06-02 NOTE — TELEPHONE ENCOUNTER
Patient called stating he has the following issues :    Presently has [facility] ins  Uncertain regarding outcome  Would like a referral to a cardiologist that accepts [facility]    Please call and advise.

## 2025-06-02 NOTE — TELEPHONE ENCOUNTER
Called pt and advised him to call his insurance and see whom they are in network with other than us as far a cardiologist wise. Pt v/u.

## 2025-06-05 ENCOUNTER — TREATMENT (OUTPATIENT)
Age: 89
End: 2025-06-05
Payer: MEDICARE

## 2025-06-05 DIAGNOSIS — M54.2 CERVICAL PAIN: ICD-10-CM

## 2025-06-05 DIAGNOSIS — R68.89 DECREASED ACTIVITY TOLERANCE: ICD-10-CM

## 2025-06-05 DIAGNOSIS — Z78.9 DECREASED ACTIVITIES OF DAILY LIVING (ADL): ICD-10-CM

## 2025-06-05 DIAGNOSIS — M75.42 ROTATOR CUFF IMPINGEMENT SYNDROME OF LEFT SHOULDER: Primary | ICD-10-CM

## 2025-06-05 DIAGNOSIS — M19.012 ARTHRITIS OF LEFT ACROMIOCLAVICULAR JOINT: ICD-10-CM

## 2025-06-05 DIAGNOSIS — M43.6 STIFFNESS OF CERVICAL SPINE: ICD-10-CM

## 2025-06-05 DIAGNOSIS — R29.898 DECREASED STRENGTH OF TRUNK AND BACK: ICD-10-CM

## 2025-06-05 PROCEDURE — 97110 THERAPEUTIC EXERCISES: CPT

## 2025-06-05 PROCEDURE — 97140 MANUAL THERAPY 1/> REGIONS: CPT

## 2025-06-05 PROCEDURE — 97530 THERAPEUTIC ACTIVITIES: CPT

## 2025-06-05 NOTE — PROGRESS NOTES
[facility] ORTHOPAED[facility]  [address] [address]  Dept: [phone]      Physical Therapy Daily Note     Referring MD: Sharon Black PA-C  Diagnosis:     ICD-10-CM    1. Rotator cuff impingement syndrome of left shoulder  M75.42       2. Arthritis of left acromioclavicular joint  M19.012       3. Cervical pain  M54.2       4. Stiffness of cervical spine  M43.6       5. Decreased activity tolerance  R68.89       6. Decreased strength of trunk and back  R29.898       7. 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  Co-morbidities affecting plan of care: hx of C6 - 7 ACDF.    PERTINENT MEDICAL HISTORY     Past medical and surgical history:   Past Medical History:   Diagnosis Date    Arrhythmia     since a child, benign per patient     Arthritis     BPPV (benign paroxysmal positional vertigo)     Carotid stenosis     Hx of left endarterectomy    Chronic kidney disease     long hx of renal insufficiency    Depression     much improved at this time    Diabetes (HCC) dx 1995    typ[place], avgfbs 100, check glucose 3 times a week, last A1C was 7, does not recognize lows    GERD (gastroesophageal reflux disease)     controlled by medication    Hyperlipemia     Hypertension     Osteoarthritis, knee     PUD (peptic ulcer disease) 05/2016    treated with protonix    TIA (transient ischemic attack)     2003     TIA (transient ischemic attack) 2003     my weakness on left side    Varicella       Past Surgical History:   Procedure Laterality Date    CARDIAC PROCEDURE N/A 1/11/2024    Left heart cath / coronary angiography performed by Calvin Steinberg MD at [facility] CARDIAC CATH LAB    CARDIAC PROCEDURE N/A 1/12/2024    Ventricular assist device (VAD) insertion performed by Calvin Steinberg MD at [facility] CARDIAC CATH LAB    CARDIAC PROCEDURE N/A 1/12/2024    Percutaneous coronary intervention performed by Idris

## 2025-06-09 ENCOUNTER — TREATMENT (OUTPATIENT)
Age: 89
End: 2025-06-09
Payer: MEDICARE

## 2025-06-09 DIAGNOSIS — M75.42 ROTATOR CUFF IMPINGEMENT SYNDROME OF LEFT SHOULDER: Primary | ICD-10-CM

## 2025-06-09 DIAGNOSIS — R68.89 DECREASED ACTIVITY TOLERANCE: ICD-10-CM

## 2025-06-09 DIAGNOSIS — M43.6 STIFFNESS OF CERVICAL SPINE: ICD-10-CM

## 2025-06-09 DIAGNOSIS — M19.012 ARTHRITIS OF LEFT ACROMIOCLAVICULAR JOINT: ICD-10-CM

## 2025-06-09 DIAGNOSIS — Z78.9 DECREASED ACTIVITIES OF DAILY LIVING (ADL): ICD-10-CM

## 2025-06-09 DIAGNOSIS — M54.2 CERVICAL PAIN: ICD-10-CM

## 2025-06-09 DIAGNOSIS — R29.898 DECREASED STRENGTH OF TRUNK AND BACK: ICD-10-CM

## 2025-06-09 PROCEDURE — 97110 THERAPEUTIC EXERCISES: CPT

## 2025-06-09 PROCEDURE — 97140 MANUAL THERAPY 1/> REGIONS: CPT

## 2025-06-09 NOTE — PROGRESS NOTES
[email or web address]  Date: 05/05/2025  Prepared by: Reji Hernández    Exercises  - Supine Chin Tucks on Flat Ball  - 2 x daily - 1 sets - 5 reps - 5 hold  - Supine Cervical Rotation AROM on Flat Ball  - 2 x daily - 1 sets - 10 reps  - Supine Scapular Retraction  - 2 x daily - 1 sets - 5 reps - 5 hold

## 2025-06-12 ENCOUNTER — TREATMENT (OUTPATIENT)
Age: 89
End: 2025-06-12
Payer: MEDICARE

## 2025-06-12 DIAGNOSIS — Z78.9 DECREASED ACTIVITIES OF DAILY LIVING (ADL): ICD-10-CM

## 2025-06-12 DIAGNOSIS — M43.6 STIFFNESS OF CERVICAL SPINE: ICD-10-CM

## 2025-06-12 DIAGNOSIS — M75.42 ROTATOR CUFF IMPINGEMENT SYNDROME OF LEFT SHOULDER: Primary | ICD-10-CM

## 2025-06-12 DIAGNOSIS — R29.898 DECREASED STRENGTH OF TRUNK AND BACK: ICD-10-CM

## 2025-06-12 DIAGNOSIS — M19.012 ARTHRITIS OF LEFT ACROMIOCLAVICULAR JOINT: ICD-10-CM

## 2025-06-12 DIAGNOSIS — R68.89 DECREASED ACTIVITY TOLERANCE: ICD-10-CM

## 2025-06-12 DIAGNOSIS — M54.2 CERVICAL PAIN: ICD-10-CM

## 2025-06-12 PROCEDURE — 97530 THERAPEUTIC ACTIVITIES: CPT

## 2025-06-12 PROCEDURE — 97110 THERAPEUTIC EXERCISES: CPT

## 2025-06-12 PROCEDURE — 97140 MANUAL THERAPY 1/> REGIONS: CPT

## 2025-06-12 NOTE — PROGRESS NOTES
[facility] ORTHOPAEDICS  [address] [facility] [address]  Dept: [phone]      Physical Therapy Daily Note     Referring MD: Sharon Black PA-C  Diagnosis:     ICD-10-CM    1. Rotator cuff impingement syndrome of left shoulder  M75.42       2. Arthritis of left acromioclavicular joint  M19.012       3. Cervical pain  M54.2       4. Stiffness of cervical spine  M43.6       5. Decreased activity tolerance  R68.89       6. Decreased strength of trunk and back  R29.898       7. 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  Co-morbidities affecting plan of care: hx of C6 - 7 ACDF.    PERTINENT MEDICAL HISTORY     Past medical and surgical history:   Past Medical History:   Diagnosis Date    Arrhythmia     since a child, benign per patient     Arthritis     BPPV (benign paroxysmal positional vertigo)     Carotid stenosis     Hx of left endarterectomy    Chronic kidney disease     long hx of renal insufficiency    Depression     much improved at this time    Diabetes (HCC) dx 1995    [place], avgfbs 100, check glucose 3 times a week, last A1C was 7, does not recognize lows    GERD (gastroesophageal reflux disease)     controlled by medication    Hyperlipemia     Hypertension     Osteoarthritis, knee     PUD (peptic ulcer disease) 05/2016    treated with protonix    TIA (transient ischemic attack)     2003     TIA (transient ischemic attack) 2003     my weakness on left side    Varicella       Past Surgical History:   Procedure Laterality Date    CARDIAC PROCEDURE N/A 1/11/2024    Left heart cath / coronary angiography performed by Calvin Steinberg MD at [facility] CARDIAC CATH LAB    CARDIAC PROCEDURE N/A 1/12/2024    Ventricular assist device (VAD) insertion performed by Calvin Steinberg MD at [facility] CARDIAC CATH LAB    CARDIAC PROCEDURE N/A 1/12/2024    Percutaneous coronary intervention performed by Idris

## 2025-06-16 ENCOUNTER — TREATMENT (OUTPATIENT)
Age: 89
End: 2025-06-16
Payer: MEDICARE

## 2025-06-16 DIAGNOSIS — M43.6 STIFFNESS OF CERVICAL SPINE: ICD-10-CM

## 2025-06-16 DIAGNOSIS — M54.2 CERVICAL PAIN: ICD-10-CM

## 2025-06-16 DIAGNOSIS — R29.898 DECREASED STRENGTH OF TRUNK AND BACK: ICD-10-CM

## 2025-06-16 DIAGNOSIS — Z78.9 DECREASED ACTIVITIES OF DAILY LIVING (ADL): ICD-10-CM

## 2025-06-16 DIAGNOSIS — R68.89 DECREASED ACTIVITY TOLERANCE: ICD-10-CM

## 2025-06-16 DIAGNOSIS — M75.42 ROTATOR CUFF IMPINGEMENT SYNDROME OF LEFT SHOULDER: Primary | ICD-10-CM

## 2025-06-16 PROCEDURE — 97530 THERAPEUTIC ACTIVITIES: CPT | Performed by: PHYSICAL THERAPIST

## 2025-06-16 PROCEDURE — 97140 MANUAL THERAPY 1/> REGIONS: CPT | Performed by: PHYSICAL THERAPIST

## 2025-06-16 PROCEDURE — 97110 THERAPEUTIC EXERCISES: CPT | Performed by: PHYSICAL THERAPIST

## 2025-06-16 NOTE — PROGRESS NOTES
[facility] ORTHOPAEDICS  [address] [facility] [address]  Dept: [phone]      Physical Therapy Daily Note     Referring MD: Sharon Black PA-C  Diagnosis:     ICD-10-CM    1. Rotator cuff impingement syndrome of left shoulder  M75.42       2. Decreased activity tolerance  R68.89       3. Decreased strength of trunk and back  R29.898       4. Decreased activities of daily living (ADL)  Z78.9       5. Stiffness of cervical spine  M43.6       6. 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  Co-morbidities affecting plan of care: hx of C6 - 7 ACDF.    PERTINENT MEDICAL HISTORY     Past medical and surgical history:   Past Medical History:   Diagnosis Date    Arrhythmia     since a child, benign per patient     Arthritis     BPPV (benign paroxysmal positional vertigo)     Carotid stenosis     Hx of left endarterectomy    Chronic kidney disease     long hx of renal insufficiency    Depression     much improved at this time    Diabetes (HCC) dx 1995    [place], avgfbs 100, check glucose 3 times a week, last A1C was 7, does not recognize lows    GERD (gastroesophageal reflux disease)     controlled by medication    Hyperlipemia     Hypertension     Osteoarthritis, knee     PUD (peptic ulcer disease) 05/2016    treated with protonix    TIA (transient ischemic attack)     2003     TIA (transient ischemic attack) 2003     my weakness on left side    Varicella       Past Surgical History:   Procedure Laterality Date    CARDIAC PROCEDURE N/A 1/11/2024    Left heart cath / coronary angiography performed by Calvin Steinberg MD at  CARDIAC CATH LAB    CARDIAC PROCEDURE N/A 1/12/2024    Ventricular assist device (VAD) insertion performed by Calvin Steinberg MD at  CARDIAC CATH LAB    CARDIAC PROCEDURE N/A 1/12/2024    Percutaneous coronary intervention performed by Calvin Steinberg MD at  CARDIAC CATH LAB    CARDIAC PROCEDURE N/A

## 2025-06-18 ENCOUNTER — TREATMENT (OUTPATIENT)
Age: 89
End: 2025-06-18

## 2025-06-18 DIAGNOSIS — M43.6 STIFFNESS OF CERVICAL SPINE: ICD-10-CM

## 2025-06-18 DIAGNOSIS — M54.2 CERVICAL PAIN: ICD-10-CM

## 2025-06-18 DIAGNOSIS — R68.89 DECREASED ACTIVITY TOLERANCE: Primary | ICD-10-CM

## 2025-06-18 DIAGNOSIS — R29.898 DECREASED STRENGTH OF TRUNK AND BACK: ICD-10-CM

## 2025-06-18 DIAGNOSIS — M75.42 ROTATOR CUFF IMPINGEMENT SYNDROME OF LEFT SHOULDER: ICD-10-CM

## 2025-06-18 DIAGNOSIS — Z78.9 DECREASED ACTIVITIES OF DAILY LIVING (ADL): ICD-10-CM

## 2025-06-18 NOTE — PROGRESS NOTES
EXTENSION - C6 5    5 ELBOW EXTENSION, WRIST FLEXION - C7 5    5 FINGER FLEXION/THUMB EXTENSION - C8 5    5 FINGER ABDUCTION/ADDUCTION - T1 5      DERMATOME TESTING: Deferred.    LEFT CERVICAL REFLEXES RIGHT   2 C5 - BICEPS 2   2 C6 - BRACHIORADIALIS 2   2 C7 - TRICEPS 2     RIGHT SHOULDER AROM (°)  (NON - INVOLVED) LEFT SHOULDER AROM (°)  (INVOLVED) 5/22/25     132 *   146 ABDUCTION 137*   Attains w ease REACHING TO OPPOSITE SHOULDER ATT w restriction*   T2 REACHING TO BACK OF NECK C7*   T10 REACHING BEHIND BACK T12     SPECIAL TESTS:   Cervical distraction: Provocative  Spurling's test: Provocative of L complaints R > L.    SUBJECTIVE      The shoulder feels better today than it did last session.    Continues w pain, stiffness of L neck and shoulder.    Patient Stated Goals: Resolve neck and shoulder pain.    OBJECTIVE       Payor: Payor: [facility] MEDICARE /  /  /  Billing pattern: Government- total time    Authorized Visits: Visit count could not be calculated. Make sure you are using a visit which is associated with an episode.       Episode visit count:  7     CPT /   Modifier needed: No  REFERRAL UNITS Treatment Time (1:1)  Total Time    (in office)     Therapeutic activity (63412) 1 10    Therapeutic exercise (09451) to develop ROM, strength, endurance and flexibility 1 15    Manual Therapy (12550) to improve joint and/or soft tissue mobility, ROM 1 15                Totals   Charge Capture   40 minutes 45 minutes     Treatment Charges & Rationale Treatment Performed Today Not Performed Today   Therapeutic Exercise  Done to improve strength, ROM, and endurance [x]Seated pulleys into flexion, flies x 2 min each  [x]Supine cervical retraction into pillow, 1 x 5 x 5 sec holds - provokes complaint - R shawl area.  [x]Chin tuck + cervical flexion: 5 sec hold/5  [x]R side lying L shoulder horizontal abduction: 2/10  [x]Open book sidelying x 12 each UE  [x]L scapular protraction/retraction - supine

## 2025-06-23 ENCOUNTER — TREATMENT (OUTPATIENT)
Age: 89
End: 2025-06-23
Payer: MEDICARE

## 2025-06-23 DIAGNOSIS — M43.6 STIFFNESS OF CERVICAL SPINE: ICD-10-CM

## 2025-06-23 DIAGNOSIS — M54.2 CERVICAL PAIN: ICD-10-CM

## 2025-06-23 DIAGNOSIS — R68.89 DECREASED ACTIVITY TOLERANCE: Primary | ICD-10-CM

## 2025-06-23 DIAGNOSIS — Z78.9 DECREASED ACTIVITIES OF DAILY LIVING (ADL): ICD-10-CM

## 2025-06-23 PROCEDURE — 97110 THERAPEUTIC EXERCISES: CPT | Performed by: PHYSICAL THERAPIST

## 2025-06-23 PROCEDURE — 97012 MECHANICAL TRACTION THERAPY: CPT | Performed by: PHYSICAL THERAPIST

## 2025-06-23 PROCEDURE — 97140 MANUAL THERAPY 1/> REGIONS: CPT | Performed by: PHYSICAL THERAPIST

## 2025-06-23 NOTE — PROGRESS NOTES
[facility] ORTHOPAEDICS  [address] [facility] [address]  Dept: [phone]      Physical Therapy Daily Note     Referring MD: Sharon Black PA-C  Diagnosis:     ICD-10-CM    1. Decreased activity tolerance  R68.89       2. Stiffness of cervical spine  M43.6       3. Cervical pain  M54.2       4. 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  Co-morbidities affecting plan of care: hx of C6 - 7 ACDF.    PERTINENT MEDICAL HISTORY     Past medical and surgical history:   Past Medical History:   Diagnosis Date    Arrhythmia     since a child, benign per patient     Arthritis     BPPV (benign paroxysmal positional vertigo)     Carotid stenosis     Hx of left endarterectomy    Chronic kidney disease     long hx of renal insufficiency    Depression     much improved at this time    Diabetes (HCC) dx 1995    [place], avgfbs 100, check glucose 3 times a week, last A1C was 7, does not recognize lows    GERD (gastroesophageal reflux disease)     controlled by medication    Hyperlipemia     Hypertension     Osteoarthritis, knee     PUD (peptic ulcer disease) 05/2016    treated with protonix    TIA (transient ischemic attack)     2003     TIA (transient ischemic attack) 2003     my weakness on left side    Varicella       Past Surgical History:   Procedure Laterality Date    CARDIAC PROCEDURE N/A 1/11/2024    Left heart cath / coronary angiography performed by Calvin Steinberg MD at [facility] CARDIAC CATH LAB    CARDIAC PROCEDURE N/A 1/12/2024    Ventricular assist device (VAD) insertion performed by Calvin Steinberg MD at [facility] CARDIAC CATH LAB    CARDIAC PROCEDURE N/A 1/12/2024    Percutaneous coronary intervention performed by Calvin Steinberg MD at [facility] CARDIAC CATH LAB    CARDIAC PROCEDURE N/A 1/12/2024    Intravascular ultrasound performed by Calvin Steinberg MD at [facility] CARDIAC CATH LAB    CARDIAC PROCEDURE N/A

## 2025-06-25 ENCOUNTER — TREATMENT (OUTPATIENT)
Age: 89
End: 2025-06-25

## 2025-06-25 DIAGNOSIS — M43.6 STIFFNESS OF CERVICAL SPINE: ICD-10-CM

## 2025-06-25 DIAGNOSIS — R68.89 DECREASED ACTIVITY TOLERANCE: Primary | ICD-10-CM

## 2025-06-25 DIAGNOSIS — M25.512 LEFT SHOULDER PAIN, UNSPECIFIED CHRONICITY: ICD-10-CM

## 2025-06-25 DIAGNOSIS — M54.2 CERVICAL PAIN: ICD-10-CM

## 2025-06-25 DIAGNOSIS — Z78.9 DECREASED ACTIVITIES OF DAILY LIVING (ADL): ICD-10-CM

## 2025-06-25 NOTE — PROGRESS NOTES
[facility] ORTHOPAEDICS  [address] [facility] [address]  Dept: [phone]      Physical Therapy Daily Note     Referring MD: Sharon Black PA-C  Diagnosis:     ICD-10-CM    1. Decreased activity tolerance  R68.89       2. Stiffness of cervical spine  M43.6       3. Cervical pain  M54.2       4. Decreased activities of daily living (ADL)  Z78.9       5. 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  Co-morbidities affecting plan of care: hx of C6 - 7 ACDF.    PERTINENT MEDICAL HISTORY     Past medical and surgical history:   Past Medical History:   Diagnosis Date    Arrhythmia     since a child, benign per patient     Arthritis     BPPV (benign paroxysmal positional vertigo)     Carotid stenosis     Hx of left endarterectomy    Chronic kidney disease     long hx of renal insufficiency    Depression     much improved at this time    Diabetes (HCC) dx 1995    [place], avgfbs 100, check glucose 3 times a week, last A1C was 7, does not recognize lows    GERD (gastroesophageal reflux disease)     controlled by medication    Hyperlipemia     Hypertension     Osteoarthritis, knee     PUD (peptic ulcer disease) 05/2016    treated with protonix    TIA (transient ischemic attack)     2003     TIA (transient ischemic attack) 2003     my weakness on left side    Varicella       Past Surgical History:   Procedure Laterality Date    CARDIAC PROCEDURE N/A 1/11/2024    Left heart cath / coronary angiography performed by Calvin Steinberg MD at [facility] CARDIAC CATH LAB    CARDIAC PROCEDURE N/A 1/12/2024    Ventricular assist device (VAD) insertion performed by Calvin Steinberg MD at [facility] CARDIAC CATH LAB    CARDIAC PROCEDURE N/A 1/12/2024    Percutaneous coronary intervention performed by Calvin Steinberg MD at [facility] CARDIAC CATH LAB    CARDIAC PROCEDURE N/A 1/12/2024    Intravascular ultrasound performed by Idris

## 2025-06-30 ENCOUNTER — TREATMENT (OUTPATIENT)
Age: 89
End: 2025-06-30
Payer: MEDICARE

## 2025-06-30 DIAGNOSIS — M25.512 LEFT SHOULDER PAIN, UNSPECIFIED CHRONICITY: ICD-10-CM

## 2025-06-30 DIAGNOSIS — R68.89 DECREASED ACTIVITY TOLERANCE: Primary | ICD-10-CM

## 2025-06-30 DIAGNOSIS — R29.898 DECREASED STRENGTH OF TRUNK AND BACK: ICD-10-CM

## 2025-06-30 DIAGNOSIS — Z78.9 DECREASED ACTIVITIES OF DAILY LIVING (ADL): ICD-10-CM

## 2025-06-30 DIAGNOSIS — M54.2 CERVICAL PAIN: ICD-10-CM

## 2025-06-30 DIAGNOSIS — M43.6 STIFFNESS OF CERVICAL SPINE: ICD-10-CM

## 2025-06-30 PROCEDURE — 97110 THERAPEUTIC EXERCISES: CPT | Performed by: PHYSICAL THERAPIST

## 2025-06-30 PROCEDURE — 97012 MECHANICAL TRACTION THERAPY: CPT | Performed by: PHYSICAL THERAPIST

## 2025-06-30 NOTE — PROGRESS NOTES
[facility] ORTHOPAEDICS  [address] [facility] [address]  Dept: [phone]      Physical Therapy Progress Report     Referring MD: Sharon Black, AMRITA  Diagnosis:     ICD-10-CM    1. Decreased activity tolerance  R68.89       2. Stiffness of cervical spine  M43.6       3. Cervical pain  M54.2       4. Decreased activities of daily living (ADL)  Z78.9       5. Left shoulder pain, unspecified chronicity  M25.512       6. 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  Co-morbidities affecting plan of care: hx of C6 - 7 ACDF.    PERTINENT MEDICAL HISTORY     Past medical and surgical history:   Past Medical History:   Diagnosis Date    Arrhythmia     since a child, benign per patient     Arthritis     BPPV (benign paroxysmal positional vertigo)     Carotid stenosis     Hx of left endarterectomy    Chronic kidney disease     long hx of renal insufficiency    Depression     much improved at this time    Diabetes (HCC) dx 1995    [place], avgfbs 100, check glucose 3 times a week, last A1C was 7, does not recognize lows    GERD (gastroesophageal reflux disease)     controlled by medication    Hyperlipemia     Hypertension     Osteoarthritis, knee     PUD (peptic ulcer disease) 05/2016    treated with protonix    TIA (transient ischemic attack)     2003     TIA (transient ischemic attack) 2003     my weakness on left side    Varicella       Past Surgical History:   Procedure Laterality Date    CARDIAC PROCEDURE N/A 1/11/2024    Left heart cath / coronary angiography performed by Calvin Steinberg MD at [facility] CARDIAC CATH LAB    CARDIAC PROCEDURE N/A 1/12/2024    Ventricular assist device (VAD) insertion performed by Calvin Steinberg MD at [facility] CARDIAC CATH LAB    CARDIAC PROCEDURE N/A 1/12/2024    Percutaneous coronary intervention performed by Calvin Steinberg MD at [facility] CARDIAC CATH LAB    CARDIAC PROCEDURE N/A

## 2025-07-03 ENCOUNTER — TREATMENT (OUTPATIENT)
Age: 89
End: 2025-07-03
Payer: MEDICARE

## 2025-07-03 DIAGNOSIS — M54.2 CERVICAL PAIN: ICD-10-CM

## 2025-07-03 DIAGNOSIS — Z78.9 DECREASED ACTIVITIES OF DAILY LIVING (ADL): ICD-10-CM

## 2025-07-03 DIAGNOSIS — R29.898 DECREASED STRENGTH OF TRUNK AND BACK: ICD-10-CM

## 2025-07-03 DIAGNOSIS — M25.512 LEFT SHOULDER PAIN, UNSPECIFIED CHRONICITY: ICD-10-CM

## 2025-07-03 DIAGNOSIS — M43.6 STIFFNESS OF CERVICAL SPINE: ICD-10-CM

## 2025-07-03 DIAGNOSIS — R68.89 DECREASED ACTIVITY TOLERANCE: Primary | ICD-10-CM

## 2025-07-03 PROCEDURE — 97110 THERAPEUTIC EXERCISES: CPT | Performed by: PHYSICAL THERAPIST

## 2025-07-03 NOTE — PROGRESS NOTES
[facility] ORTHOPAEDICS  [address] [facility] [address]  Dept: [phone]      Physical Therapy Discharge     Referring MD: Sharon Black PA-C  Diagnosis:     ICD-10-CM    1. Decreased activity tolerance  R68.89       2. Stiffness of cervical spine  M43.6       3. Cervical pain  M54.2       4. Decreased activities of daily living (ADL)  Z78.9       5. Left shoulder pain, unspecified chronicity  M25.512       6. 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  Co-morbidities affecting plan of care: hx of C6 - 7 ACDF.    PERTINENT MEDICAL HISTORY     Past medical and surgical history:   Past Medical History:   Diagnosis Date    Arrhythmia     since a child, benign per patient     Arthritis     BPPV (benign paroxysmal positional vertigo)     Carotid stenosis     Hx of left endarterectomy    Chronic kidney disease     long hx of renal insufficiency    Depression     much improved at this time    Diabetes (HCC) dx 1995    [place], avgfbs 100, check glucose 3 times a week, last A1C was 7, does not recognize lows    GERD (gastroesophageal reflux disease)     controlled by medication    Hyperlipemia     Hypertension     Osteoarthritis, knee     PUD (peptic ulcer disease) 05/2016    treated with protonix    TIA (transient ischemic attack)     2003     TIA (transient ischemic attack) 2003     my weakness on left side    Varicella       Past Surgical History:   Procedure Laterality Date    CARDIAC PROCEDURE N/A 1/11/2024    Left heart cath / coronary angiography performed by Calvin Steinberg MD at [facility] CARDIAC CATH LAB    CARDIAC PROCEDURE N/A 1/12/2024    Ventricular assist device (VAD) insertion performed by Calvin Steinberg MD at [facility] CARDIAC CATH LAB    CARDIAC PROCEDURE N/A 1/12/2024    Percutaneous coronary intervention performed by Calvin Steinberg MD at [facility] CARDIAC CATH LAB    CARDIAC PROCEDURE N/A

## 2025-07-10 RX ORDER — ROSUVASTATIN CALCIUM 20 MG/1
20 TABLET, COATED ORAL DAILY
Qty: 90 TABLET | Refills: 3 | Status: SHIPPED | OUTPATIENT
Start: 2025-07-10

## 2025-07-10 NOTE — TELEPHONE ENCOUNTER
Requested Prescriptions     Pending Prescriptions Disp Refills    rosuvastatin (CRESTOR) 20 MG tablet 90 tablet 3     Sig: Take 1 tablet by mouth daily     Rx verified last ov 3/7/25

## 2025-07-10 NOTE — TELEPHONE ENCOUNTER
MEDICATION REFILL REQUEST      Name of Medication:  rosuvastatin   Dose:  20 mg  Frequency:  daily   Quantity:    Days' supply:        Pharmacy Name/Location:  [facility]

## 2025-08-28 ENCOUNTER — TELEPHONE (OUTPATIENT)
Dept: ORTHOPEDIC SURGERY | Age: 89
End: 2025-08-28

## 2025-08-28 ENCOUNTER — HOSPITAL ENCOUNTER (EMERGENCY)
Age: 89
Discharge: HOME | End: 2025-08-28
Attending: EMERGENCY MEDICINE
Payer: MEDICARE

## 2025-08-28 VITALS
WEIGHT: 104 LBS | SYSTOLIC BLOOD PRESSURE: 159 MMHG | HEIGHT: 63 IN | RESPIRATION RATE: 15 BRPM | DIASTOLIC BLOOD PRESSURE: 71 MMHG | TEMPERATURE: 98.1 F | OXYGEN SATURATION: 99 % | BODY MASS INDEX: 18.43 KG/M2 | HEART RATE: 71 BPM

## 2025-08-28 DIAGNOSIS — M48.02 CERVICAL SPINAL STENOSIS: ICD-10-CM

## 2025-08-28 DIAGNOSIS — M54.12 CERVICAL RADICULOPATHY: Primary | ICD-10-CM

## 2025-08-28 LAB
FLUAV RNA SPEC QL NAA+PROBE: NOT DETECTED
FLUBV RNA SPEC QL NAA+PROBE: NOT DETECTED
SARS-COV-2 RDRP RESP QL NAA+PROBE: NOT DETECTED
SOURCE: NORMAL

## 2025-08-28 PROCEDURE — 96372 THER/PROPH/DIAG INJ SC/IM: CPT

## 2025-08-28 PROCEDURE — 99284 EMERGENCY DEPT VISIT MOD MDM: CPT

## 2025-08-28 PROCEDURE — 6360000002 HC RX W HCPCS: Performed by: EMERGENCY MEDICINE

## 2025-08-28 PROCEDURE — 87636 SARSCOV2 & INF A&B AMP PRB: CPT

## 2025-08-28 RX ORDER — DEXAMETHASONE SODIUM PHOSPHATE 10 MG/ML
6 INJECTION, SOLUTION INTRA-ARTICULAR; INTRALESIONAL; INTRAMUSCULAR; INTRAVENOUS; SOFT TISSUE
Status: COMPLETED | OUTPATIENT
Start: 2025-08-28 | End: 2025-08-28

## 2025-08-28 RX ORDER — OXYCODONE HYDROCHLORIDE 5 MG/1
5 TABLET ORAL EVERY 8 HOURS PRN
Qty: 9 TABLET | Refills: 0 | Status: SHIPPED | OUTPATIENT
Start: 2025-08-28 | End: 2025-08-31

## 2025-08-28 RX ADMIN — DEXAMETHASONE SODIUM PHOSPHATE 6 MG: 10 INJECTION INTRAMUSCULAR; INTRAVENOUS at 06:47

## 2025-08-28 ASSESSMENT — PAIN - FUNCTIONAL ASSESSMENT
PAIN_FUNCTIONAL_ASSESSMENT: 0-10
PAIN_FUNCTIONAL_ASSESSMENT: 0-10

## 2025-08-28 ASSESSMENT — PAIN DESCRIPTION - LOCATION: LOCATION: GENERALIZED

## 2025-08-28 ASSESSMENT — PAIN SCALES - GENERAL
PAINLEVEL_OUTOF10: 10
PAINLEVEL_OUTOF10: 0

## 2025-08-28 ASSESSMENT — PAIN DESCRIPTION - DESCRIPTORS: DESCRIPTORS: ACHING

## 2025-08-28 ASSESSMENT — LIFESTYLE VARIABLES
HOW MANY STANDARD DRINKS CONTAINING ALCOHOL DO YOU HAVE ON A TYPICAL DAY: PATIENT DOES NOT DRINK
HOW OFTEN DO YOU HAVE A DRINK CONTAINING ALCOHOL: NEVER

## (undated) DEVICE — CATH BLLN ANGIO 2.50X12MM SC EUPHORA RX

## (undated) DEVICE — CATHETER COR DIAG JUDKINS L 3.5 CRV 6FR 100CM 0 SIDE H

## (undated) DEVICE — HI-TORQUE BALANCE MIDDLEWEIGHT GUIDE WIRE .014 J TIP 3.0 CM X 190 CM: Brand: HI-TORQUE BALANCE MIDDLEWEIGHT

## (undated) DEVICE — CATHETER GUID 6FR L100CM DIA0.071IN NYL SHFT EBU3.0 W/O

## (undated) DEVICE — CATHETER 6FR JR5 MEDTRONIC 100CM

## (undated) DEVICE — Device

## (undated) DEVICE — CATHETER GUIDE EXTN 6 FRX150 CM HYDRPHLC COAT TELSCP

## (undated) DEVICE — GUIDEWIRE VASC L260CM DIA0.035IN RAD 3MM J TIP L7CM PTFE

## (undated) DEVICE — CATH BLLN ANGIO 3.50X12MM SC EUPHORA RX

## (undated) DEVICE — CATHETER COR DIAG JUDKINS R 5.0 CRV 6FR 100CM 0 SIDE H

## (undated) DEVICE — CATHETER 6FR JL3.5 MEDTRONIC 100CM

## (undated) DEVICE — 40 MHZ CORONARY IMAGING CATHETER: Brand: OPTICROSS

## (undated) DEVICE — CATH BLLN ANGIO 3.50X12MM NC EUPHORIA RX

## (undated) DEVICE — VASC-BAND SHORT

## (undated) DEVICE — CATHETER 5FR JL3.5 CORDIS 100CM

## (undated) DEVICE — GLIDESHEATH SLENDER STAINLESS STEEL KIT: Brand: GLIDESHEATH SLENDER

## (undated) DEVICE — DISPOSABLE PULLBACK SLED FOR MOTORDRIVE

## (undated) DEVICE — GUIDE 6FR JR5 CORDIS 100CM

## (undated) DEVICE — GUIDE 6FR XB3 CORDIS 100CM

## (undated) DEVICE — PERCLOSE™ PROSTYLE™ SUTURE-MEDIATED CLOSURE AND REPAIR SYSTEM: Brand: PERCLOSE™ PROSTYLE™

## (undated) DEVICE — GUIDEWIRE 035IN 210CM PTFE COAT FIX COR J TIP 15MM FIRM BODY

## (undated) DEVICE — BAND COMPR L24CM REG CLR PLAS HEMSTAT EXT HK AND LOOP RETEN

## (undated) DEVICE — Device: Brand: PROWATER

## (undated) DEVICE — COPILOT BLEEDBACK CONTROL VALVE: Brand: COPILOT

## (undated) DEVICE — CATH BLLN ANGIO 3X12MM NC EUPHORIA RX

## (undated) DEVICE — BAND COMPR L21CM SHT CLR PLAS HEMSTAT EXT HK AND LOOP RETEN

## (undated) DEVICE — CATH BLLN ANGIO 4X8MM NC EUPHORIA RX

## (undated) DEVICE — CATHETER GUID 5FR L100CM DIA0.058IN NYL SHFT EBU3.0 W/O

## (undated) DEVICE — CATH BLLN ANGIO 3X15MM SC EUPHORA RX

## (undated) DEVICE — HI-TORQUE PILOT 50 GUIDE WIRE .014 J TIP 3.0 CM X 190 CM: Brand: HI-TORQUE PILOT

## (undated) DEVICE — COVER US PRB W15XL120CM W/ GEL RUBBERBAND TAPE STRP FLD GEN

## (undated) DEVICE — CATHETER DIAG AD 5FR L100CM COR GRY HYDRPHLC NYL MPA 2 W/ 2

## (undated) DEVICE — CORONARY IMAGING CATHETER: Brand: OPTICROSS™ HD

## (undated) DEVICE — ANGIO-SEAL VIP VASCULAR CLOSURE DEVICE: Brand: ANGIO-SEAL

## (undated) DEVICE — GUIDE 6FR XB3.5 CORDIS 100CM

## (undated) DEVICE — CATH BLLN ANGIO 3X15MM NC EUPHORIA RX

## (undated) DEVICE — CATHETER IVL C2PLUS SHOCKWAVE 3.0MM X 12MM

## (undated) DEVICE — RUNTHROUGH NS EXTRA FLOPPY PTCA GUIDEWIRE: Brand: RUNTHROUGH

## (undated) DEVICE — CATHETER DIAG 6FR L110CM PIGTAILS CRV STYL PIG145 DXTERITY

## (undated) DEVICE — CATHETER DIAG 6FR L100CM GWIRE 0.038IN S STL POLYUR MP W/ 2

## (undated) DEVICE — WIRE .035 3MM J TIP 260CM

## (undated) DEVICE — CATH BLLN ANGIO 3.50X15MM NC EUPHORA RX

## (undated) DEVICE — INFLATION DEVICE: Brand: ENCORE™ 26

## (undated) DEVICE — CATH BLLN ANGIO 3.50X15MM SC EUPHORA RX

## (undated) DEVICE — CATH BLLN ANGIO 2X12MM SC EUPHORA RX